# Patient Record
Sex: FEMALE | Race: WHITE | Employment: OTHER | URBAN - METROPOLITAN AREA
[De-identification: names, ages, dates, MRNs, and addresses within clinical notes are randomized per-mention and may not be internally consistent; named-entity substitution may affect disease eponyms.]

---

## 2017-01-26 LAB
CHOLESTEROL, TOTAL: 147 MG/DL
CHOLESTEROL/HDL RATIO: 4.2
HDLC SERPL-MCNC: 35 MG/DL (ref 35–70)
LDL CHOLESTEROL CALCULATED: 88 MG/DL (ref 0–160)
TRIGL SERPL-MCNC: 119 MG/DL
VLDLC SERPL CALC-MCNC: 24 MG/DL

## 2017-12-08 LAB — ANTIBODY: NEGATIVE

## 2018-04-17 ENCOUNTER — OFFICE VISIT (OUTPATIENT)
Dept: FAMILY MEDICINE CLINIC | Age: 63
End: 2018-04-17
Payer: COMMERCIAL

## 2018-04-17 VITALS
OXYGEN SATURATION: 99 % | WEIGHT: 228 LBS | SYSTOLIC BLOOD PRESSURE: 124 MMHG | BODY MASS INDEX: 40.4 KG/M2 | HEIGHT: 63 IN | TEMPERATURE: 98.3 F | HEART RATE: 81 BPM | DIASTOLIC BLOOD PRESSURE: 80 MMHG

## 2018-04-17 DIAGNOSIS — J43.9 PULMONARY EMPHYSEMA, UNSPECIFIED EMPHYSEMA TYPE (HCC): ICD-10-CM

## 2018-04-17 DIAGNOSIS — F32.89 OTHER DEPRESSION: ICD-10-CM

## 2018-04-17 DIAGNOSIS — Z13.31 POSITIVE DEPRESSION SCREENING: ICD-10-CM

## 2018-04-17 DIAGNOSIS — Z00.00 ENCOUNTER FOR MEDICAL EXAMINATION TO ESTABLISH CARE: Primary | ICD-10-CM

## 2018-04-17 DIAGNOSIS — G89.29 CHRONIC BILATERAL LOW BACK PAIN WITHOUT SCIATICA: ICD-10-CM

## 2018-04-17 DIAGNOSIS — Z01.89 ROUTINE LAB DRAW: ICD-10-CM

## 2018-04-17 DIAGNOSIS — M54.50 CHRONIC BILATERAL LOW BACK PAIN WITHOUT SCIATICA: ICD-10-CM

## 2018-04-17 DIAGNOSIS — K59.03 DRUG-INDUCED CONSTIPATION: ICD-10-CM

## 2018-04-17 DIAGNOSIS — G90.A POTS (POSTURAL ORTHOSTATIC TACHYCARDIA SYNDROME): ICD-10-CM

## 2018-04-17 DIAGNOSIS — E11.9 DIABETES MELLITUS TYPE 2 WITHOUT RETINOPATHY (HCC): ICD-10-CM

## 2018-04-17 DIAGNOSIS — F17.200 SMOKER: ICD-10-CM

## 2018-04-17 PROBLEM — H02.831 DERMATOCHALASIS OF BOTH UPPER EYELIDS: Status: ACTIVE | Noted: 2017-02-02

## 2018-04-17 PROBLEM — H02.834 DERMATOCHALASIS OF BOTH UPPER EYELIDS: Status: ACTIVE | Noted: 2017-02-02

## 2018-04-17 PROBLEM — H25.813 COMBINED FORMS OF AGE-RELATED CATARACT OF BOTH EYES: Status: ACTIVE | Noted: 2017-02-02

## 2018-04-17 PROBLEM — F32.A DEPRESSION: Status: ACTIVE | Noted: 2018-04-17

## 2018-04-17 LAB — HBA1C MFR BLD: 5.8 %

## 2018-04-17 PROCEDURE — 99204 OFFICE O/P NEW MOD 45 MIN: CPT | Performed by: FAMILY MEDICINE

## 2018-04-17 PROCEDURE — 83036 HEMOGLOBIN GLYCOSYLATED A1C: CPT | Performed by: FAMILY MEDICINE

## 2018-04-17 PROCEDURE — 2022F DILAT RTA XM EVC RTNOPTHY: CPT | Performed by: FAMILY MEDICINE

## 2018-04-17 PROCEDURE — G8926 SPIRO NO PERF OR DOC: HCPCS | Performed by: FAMILY MEDICINE

## 2018-04-17 PROCEDURE — 3023F SPIROM DOC REV: CPT | Performed by: FAMILY MEDICINE

## 2018-04-17 PROCEDURE — G8431 POS CLIN DEPRES SCRN F/U DOC: HCPCS | Performed by: FAMILY MEDICINE

## 2018-04-17 PROCEDURE — 3014F SCREEN MAMMO DOC REV: CPT | Performed by: FAMILY MEDICINE

## 2018-04-17 PROCEDURE — 3017F COLORECTAL CA SCREEN DOC REV: CPT | Performed by: FAMILY MEDICINE

## 2018-04-17 PROCEDURE — G8427 DOCREV CUR MEDS BY ELIG CLIN: HCPCS | Performed by: FAMILY MEDICINE

## 2018-04-17 PROCEDURE — 4004F PT TOBACCO SCREEN RCVD TLK: CPT | Performed by: FAMILY MEDICINE

## 2018-04-17 PROCEDURE — 3044F HG A1C LEVEL LT 7.0%: CPT | Performed by: FAMILY MEDICINE

## 2018-04-17 PROCEDURE — G8417 CALC BMI ABV UP PARAM F/U: HCPCS | Performed by: FAMILY MEDICINE

## 2018-04-17 PROCEDURE — G0444 DEPRESSION SCREEN ANNUAL: HCPCS | Performed by: FAMILY MEDICINE

## 2018-04-17 RX ORDER — SIMVASTATIN 40 MG
1 TABLET ORAL DAILY
COMMUNITY
Start: 2018-01-14 | End: 2019-03-27 | Stop reason: SDUPTHER

## 2018-04-17 RX ORDER — OLANZAPINE 20 MG/1
1 TABLET ORAL NIGHTLY
COMMUNITY
Start: 2018-02-07 | End: 2018-10-17 | Stop reason: SDUPTHER

## 2018-04-17 RX ORDER — MELOXICAM 15 MG/1
1 TABLET ORAL DAILY
COMMUNITY
Start: 2018-01-17 | End: 2019-04-17 | Stop reason: SDUPTHER

## 2018-04-17 RX ORDER — ACETAMINOPHEN AND CODEINE PHOSPHATE 300; 60 MG/1; MG/1
1 TABLET ORAL 3 TIMES DAILY PRN
COMMUNITY
Start: 2016-02-22

## 2018-04-17 RX ORDER — OMEPRAZOLE 40 MG/1
1 CAPSULE, DELAYED RELEASE ORAL DAILY
COMMUNITY
Start: 2018-03-26 | End: 2018-10-11 | Stop reason: SDUPTHER

## 2018-04-17 RX ORDER — LISINOPRIL 10 MG/1
10 TABLET ORAL
COMMUNITY
Start: 2017-03-03 | End: 2019-03-27 | Stop reason: SDUPTHER

## 2018-04-17 RX ORDER — METFORMIN HYDROCHLORIDE 500 MG/1
1 TABLET, EXTENDED RELEASE ORAL 2 TIMES DAILY
COMMUNITY
Start: 2018-04-05 | End: 2020-01-08 | Stop reason: SDUPTHER

## 2018-04-17 RX ORDER — DULOXETIN HYDROCHLORIDE 30 MG/1
90 CAPSULE, DELAYED RELEASE ORAL DAILY
Qty: 270 CAPSULE | Refills: 3 | Status: SHIPPED | OUTPATIENT
Start: 2018-04-17 | End: 2019-05-23 | Stop reason: SDUPTHER

## 2018-04-17 RX ORDER — CLOPIDOGREL BISULFATE 75 MG/1
1 TABLET ORAL DAILY
COMMUNITY
Start: 2018-04-05 | End: 2019-03-27 | Stop reason: SDUPTHER

## 2018-04-17 RX ORDER — DOCUSATE SODIUM 100 MG/1
100 CAPSULE, LIQUID FILLED ORAL 2 TIMES DAILY PRN
Qty: 60 CAPSULE | Refills: 2 | Status: SHIPPED | OUTPATIENT
Start: 2018-04-17 | End: 2019-10-08 | Stop reason: ALTCHOICE

## 2018-04-17 RX ORDER — DULOXETIN HYDROCHLORIDE 60 MG/1
1 CAPSULE, DELAYED RELEASE ORAL DAILY
COMMUNITY
Start: 2018-01-20 | End: 2018-04-17

## 2018-04-17 ASSESSMENT — ENCOUNTER SYMPTOMS
RHINORRHEA: 0
ABDOMINAL PAIN: 0
COUGH: 0
WHEEZING: 0
SORE THROAT: 0
DIARRHEA: 0
SHORTNESS OF BREATH: 0
CONSTIPATION: 0

## 2018-04-17 ASSESSMENT — PATIENT HEALTH QUESTIONNAIRE - PHQ9
7. TROUBLE CONCENTRATING ON THINGS, SUCH AS READING THE NEWSPAPER OR WATCHING TELEVISION: 0
SUM OF ALL RESPONSES TO PHQ QUESTIONS 1-9: 15
6. FEELING BAD ABOUT YOURSELF - OR THAT YOU ARE A FAILURE OR HAVE LET YOURSELF OR YOUR FAMILY DOWN: 3
8. MOVING OR SPEAKING SO SLOWLY THAT OTHER PEOPLE COULD HAVE NOTICED. OR THE OPPOSITE, BEING SO FIGETY OR RESTLESS THAT YOU HAVE BEEN MOVING AROUND A LOT MORE THAN USUAL: 0
9. THOUGHTS THAT YOU WOULD BE BETTER OFF DEAD, OR OF HURTING YOURSELF: 0
1. LITTLE INTEREST OR PLEASURE IN DOING THINGS: 3
5. POOR APPETITE OR OVEREATING: 0
3. TROUBLE FALLING OR STAYING ASLEEP: 3
SUM OF ALL RESPONSES TO PHQ9 QUESTIONS 1 & 2: 6
4. FEELING TIRED OR HAVING LITTLE ENERGY: 3
10. IF YOU CHECKED OFF ANY PROBLEMS, HOW DIFFICULT HAVE THESE PROBLEMS MADE IT FOR YOU TO DO YOUR WORK, TAKE CARE OF THINGS AT HOME, OR GET ALONG WITH OTHER PEOPLE: 1
2. FEELING DOWN, DEPRESSED OR HOPELESS: 3

## 2018-04-24 ENCOUNTER — OFFICE VISIT (OUTPATIENT)
Dept: CARDIOLOGY CLINIC | Age: 63
End: 2018-04-24
Payer: COMMERCIAL

## 2018-04-24 VITALS
SYSTOLIC BLOOD PRESSURE: 124 MMHG | TEMPERATURE: 97.1 F | OXYGEN SATURATION: 94 % | HEIGHT: 63 IN | DIASTOLIC BLOOD PRESSURE: 82 MMHG | WEIGHT: 225.8 LBS | RESPIRATION RATE: 20 BRPM | HEART RATE: 84 BPM | BODY MASS INDEX: 40.01 KG/M2

## 2018-04-24 DIAGNOSIS — I10 ESSENTIAL HYPERTENSION: ICD-10-CM

## 2018-04-24 DIAGNOSIS — G90.A POTS (POSTURAL ORTHOSTATIC TACHYCARDIA SYNDROME): Primary | ICD-10-CM

## 2018-04-24 DIAGNOSIS — F17.200 SMOKER: ICD-10-CM

## 2018-04-24 PROCEDURE — 3017F COLORECTAL CA SCREEN DOC REV: CPT | Performed by: INTERNAL MEDICINE

## 2018-04-24 PROCEDURE — 99244 OFF/OP CNSLTJ NEW/EST MOD 40: CPT | Performed by: INTERNAL MEDICINE

## 2018-04-24 PROCEDURE — G8427 DOCREV CUR MEDS BY ELIG CLIN: HCPCS | Performed by: INTERNAL MEDICINE

## 2018-04-24 PROCEDURE — G8417 CALC BMI ABV UP PARAM F/U: HCPCS | Performed by: INTERNAL MEDICINE

## 2018-04-24 ASSESSMENT — ENCOUNTER SYMPTOMS
APNEA: 0
NAUSEA: 0
CHEST TIGHTNESS: 0
SHORTNESS OF BREATH: 0
VOMITING: 0

## 2018-06-27 ENCOUNTER — HOSPITAL ENCOUNTER (OUTPATIENT)
Age: 63
Setting detail: SPECIMEN
Discharge: HOME | End: 2018-06-27
Payer: COMMERCIAL

## 2018-06-27 ENCOUNTER — OFFICE VISIT (OUTPATIENT)
Dept: FAMILY MEDICINE CLINIC | Age: 63
End: 2018-06-27
Payer: COMMERCIAL

## 2018-06-27 VITALS
SYSTOLIC BLOOD PRESSURE: 122 MMHG | WEIGHT: 226.4 LBS | TEMPERATURE: 98.6 F | DIASTOLIC BLOOD PRESSURE: 80 MMHG | BODY MASS INDEX: 40.43 KG/M2 | HEART RATE: 76 BPM | OXYGEN SATURATION: 94 %

## 2018-06-27 DIAGNOSIS — M79.89 LEG SWELLING: ICD-10-CM

## 2018-06-27 DIAGNOSIS — B37.2 CANDIDAL INTERTRIGO: ICD-10-CM

## 2018-06-27 DIAGNOSIS — M79.89 LEG SWELLING: Primary | ICD-10-CM

## 2018-06-27 LAB
ANION GAP SERPL CALCULATED.3IONS-SCNC: 13 MEQ/L (ref 7–13)
BUN BLDV-MCNC: 9 MG/DL (ref 8–23)
CALCIUM SERPL-MCNC: 8.9 MG/DL (ref 8.6–10.2)
CHLORIDE BLD-SCNC: 101 MEQ/L (ref 98–107)
CO2: 28 MEQ/L (ref 22–29)
CREAT SERPL-MCNC: 0.76 MG/DL (ref 0.5–0.9)
GFR AFRICAN AMERICAN: >60
GFR NON-AFRICAN AMERICAN: >60
GLUCOSE BLD-MCNC: 103 MG/DL (ref 74–109)
POTASSIUM SERPL-SCNC: 4 MEQ/L (ref 3.5–5.1)
SODIUM BLD-SCNC: 142 MEQ/L (ref 132–144)

## 2018-06-27 PROCEDURE — G8427 DOCREV CUR MEDS BY ELIG CLIN: HCPCS | Performed by: FAMILY MEDICINE

## 2018-06-27 PROCEDURE — 3017F COLORECTAL CA SCREEN DOC REV: CPT | Performed by: FAMILY MEDICINE

## 2018-06-27 PROCEDURE — G8417 CALC BMI ABV UP PARAM F/U: HCPCS | Performed by: FAMILY MEDICINE

## 2018-06-27 PROCEDURE — 4004F PT TOBACCO SCREEN RCVD TLK: CPT | Performed by: FAMILY MEDICINE

## 2018-06-27 PROCEDURE — 80048 BASIC METABOLIC PNL TOTAL CA: CPT

## 2018-06-27 PROCEDURE — 99214 OFFICE O/P EST MOD 30 MIN: CPT | Performed by: FAMILY MEDICINE

## 2018-06-27 RX ORDER — FUROSEMIDE 20 MG/1
20 TABLET ORAL DAILY PRN
Qty: 30 TABLET | Refills: 0 | Status: SHIPPED | OUTPATIENT
Start: 2018-06-27 | End: 2020-09-16

## 2018-06-27 ASSESSMENT — ENCOUNTER SYMPTOMS
DIARRHEA: 0
RHINORRHEA: 0
ABDOMINAL PAIN: 0
SORE THROAT: 0
WHEEZING: 0
CONSTIPATION: 0
SHORTNESS OF BREATH: 0
COUGH: 0

## 2018-07-10 ENCOUNTER — HOSPITAL ENCOUNTER (OUTPATIENT)
Dept: MRI IMAGING | Age: 63
Discharge: HOME | End: 2018-07-12
Payer: COMMERCIAL

## 2018-07-10 DIAGNOSIS — M54.50 LUMBAR SPINE PAIN: ICD-10-CM

## 2018-07-10 DIAGNOSIS — I73.9 CLAUDICATION (HCC): ICD-10-CM

## 2018-07-10 PROCEDURE — 72148 MRI LUMBAR SPINE W/O DYE: CPT

## 2018-07-25 ENCOUNTER — OFFICE VISIT (OUTPATIENT)
Dept: CARDIOLOGY CLINIC | Age: 63
End: 2018-07-25
Payer: COMMERCIAL

## 2018-07-25 VITALS
RESPIRATION RATE: 22 BRPM | TEMPERATURE: 97.3 F | WEIGHT: 220.8 LBS | HEART RATE: 85 BPM | SYSTOLIC BLOOD PRESSURE: 122 MMHG | HEIGHT: 63 IN | OXYGEN SATURATION: 99 % | BODY MASS INDEX: 39.12 KG/M2 | DIASTOLIC BLOOD PRESSURE: 82 MMHG

## 2018-07-25 DIAGNOSIS — F17.200 SMOKER: ICD-10-CM

## 2018-07-25 DIAGNOSIS — G90.A POTS (POSTURAL ORTHOSTATIC TACHYCARDIA SYNDROME): Primary | ICD-10-CM

## 2018-07-25 DIAGNOSIS — I10 ESSENTIAL HYPERTENSION: ICD-10-CM

## 2018-07-25 PROCEDURE — 4004F PT TOBACCO SCREEN RCVD TLK: CPT | Performed by: INTERNAL MEDICINE

## 2018-07-25 PROCEDURE — 3017F COLORECTAL CA SCREEN DOC REV: CPT | Performed by: INTERNAL MEDICINE

## 2018-07-25 PROCEDURE — G8417 CALC BMI ABV UP PARAM F/U: HCPCS | Performed by: INTERNAL MEDICINE

## 2018-07-25 PROCEDURE — G8427 DOCREV CUR MEDS BY ELIG CLIN: HCPCS | Performed by: INTERNAL MEDICINE

## 2018-07-25 PROCEDURE — 99213 OFFICE O/P EST LOW 20 MIN: CPT | Performed by: INTERNAL MEDICINE

## 2018-07-25 ASSESSMENT — ENCOUNTER SYMPTOMS
APNEA: 0
DIARRHEA: 0
CHEST TIGHTNESS: 0
BLOOD IN STOOL: 0
VOMITING: 0
SHORTNESS OF BREATH: 0
NAUSEA: 0

## 2018-07-25 NOTE — PROGRESS NOTES
Subsequent Progress Note  Patient: Gladys Search  YOB: 1955  MRN: [id number]    Chief Complaint:  Chief Complaint   Patient presents with    3 Month Follow-Up     POTS         Subjective/HPI:  7/25/18: Patient presents today for follow-up of POTS. Retired beautician. He has seen [facility] in the past. Reportedly had a stroke. She is on Plavix. Has not been able to get disability. Diabetic. And dyslipidemia. She also has GERD. These other issues are stable. See me in 6 months.     4/24/18: Patient presents today for Evaluation of pots. Moderately obese retired beautician. Last episode of syncope was 2 years ago. She had what appears to be a TIA-like symptoms. About a year ago. With weakness on one side of the body. She then went to the ER at [facility]. Rare she had extensive workup by Vidal Phoenix. She was told she might have a TIA. She was started on Plavix. Has not had any spells since then. She still smokes. About a pack per day. He is diabetic. No definite angina congestive heart failure. She has dyslipidemia. She is on excellent medications. Height is for coronary artery disease but already had extensive workup including echo stress test carotid ultrasound. Carotids showed less than 30% stenosis bilaterally. I'll see her in 3 months. Continue same medications.  No angina congestive heart failure.            Past Medical History:   Diagnosis Date    [facility]     Depression     Headache     Hypertension     Neuropathy ([facility])     POTS (postural orthostatic tachycardia syndrome)     2000's    Restless legs syndrome     Type 2 diabetes mellitus without complication ([facility])        Past Surgical History:   Procedure Laterality Date    ABDOMINAL EXPLORATION SURGERY      ELBOW SURGERY Right     ENDOMETRIAL ABLATION      TONSILLECTOMY      age 24       Family History   Problem Relation Age of Onset    Stroke Mother     Diabetes Father         Pre  Heart Attack Father     Cancer Maternal Grandmother         colon    Cancer Maternal Grandfather     Alzheimer's Disease Paternal Grandmother     Alzheimer's Disease Paternal Grandfather     Diabetes Paternal Grandfather        Social History     Social History    Marital status:      Spouse name: N/A    Number of children: N/A    Years of education: N/A     Social History Main Topics    Smoking status: Current Every Day Smoker     Packs/day: 1.00     Years: 40.00     Types: Cigarettes    Smokeless tobacco: Never Used    Alcohol use Yes      Comment: social     Drug use: No    Sexual activity: No     Other Topics Concern    None     Social History Narrative    None       Allergies   Allergen Reactions    Aspirin Swelling    Penicillins Rash       Current Outpatient Prescriptions   Medication Sig Dispense Refill    furosemide (LASIX) 20 MG tablet Take 1 tablet by mouth daily as needed (leg swelling) 30 tablet 0    nystatin-triamcinolone (MYCOLOG II) 255767-1.1 UNIT/GM-% cream Apply topically 2 times daily. 60 g 1    Exenatide 2 MG PEN Inject 2 mg into the skin once a week      insulin lispro (HUMALOG) 100 UNIT/ML pen 3 units for every 15 grams of CHOs , Add 2 units for each 50>150. Add to this 3 units before dinner - ~ 65 - 70 units daily      Gabapentin, Once-Daily, 600 MG TABS Take 1 tablet by mouth daily. Wero Morel simvastatin (ZOCOR) 40 MG tablet 1 tablet daily      clopidogrel (PLAVIX) 75 MG tablet 1 tablet daily      metFORMIN (GLUCOPHAGE-XR) 500 MG extended release tablet 1 tablet 2 times daily      lisinopril (PRINIVIL;ZESTRIL) 10 MG tablet Take 10 mg by mouth      OLANZapine (ZYPREXA) 20 MG tablet 1 tablet nightly      acetaminophen-codeine (TYLENOL/CODEINE #4) 300-60 MG per tablet Take 1 tablet by mouth 3 times daily as needed. Wero Morel omeprazole (PRILOSEC) 40 MG delayed release capsule 1 capsule daily      meloxicam (MOBIC) 15 MG tablet 1 tablet daily      UNABLE TO FIND Fentanyl 1,000 mg/day and Bupivicaine 30 mg/day via implanted pump      insulin glargine (LANTUS SOLOSTAR) 100 UNIT/ML injection pen Inject 30 Units into the skin 2 times daily 5 pen 11    DULoxetine (CYMBALTA) 30 MG extended release capsule Take 3 capsules by mouth daily 270 capsule 3    docusate sodium (COLACE) 100 MG capsule Take 1 capsule by mouth 2 times daily as needed for Constipation 60 capsule 2     No current facility-administered medications for this visit. Review of Systems:   Review of Systems   Constitutional: Negative for diaphoresis and fatigue. HENT: Negative for nosebleeds. Respiratory: Negative for apnea, chest tightness and shortness of breath. Cardiovascular: Positive for leg swelling (Every day with walking/standing). Negative for chest pain and palpitations. Gastrointestinal: Negative for blood in stool, diarrhea, nausea and vomiting. Musculoskeletal: Negative for myalgias, neck pain and neck stiffness. Neurological: Positive for numbness (RT Arm from elbow to hand has tingling). Negative for dizziness, seizures, syncope, weakness, light-headedness and headaches. Hematological: Does not bruise/bleed easily. Psychiatric/Behavioral: Negative for confusion. The patient is not nervous/anxious. All other systems reviewed and are negative. Review of System is negative except for as mentioned above. Physical Examination:    /82 (Site: Left Arm, Position: Sitting, Cuff Size: Medium Adult)   Pulse 85   Temp 97.3 °F (36.3 °C) (Temporal)   Resp 22   Ht 5' 2.75\" (1.594 m)   Wt 220 lb 12.8 oz (100.2 kg)   SpO2 99%   BMI 39.43 kg/m²    Physical Exam   Constitutional: She appears healthy. HENT:   Nose: Nose normal.   Mouth/Throat: Dentition is normal. Oropharynx is clear. Eyes: Pupils are equal, round, and reactive to light. Neck: Normal range of motion.    Cardiovascular: Normal rate, regular rhythm, S1 normal, S2 normal, normal heart sounds, intact distal pulses and normal pulses. No extrasystoles are present. Exam reveals no gallop. No murmur heard. Pulmonary/Chest: Effort normal and breath sounds normal. She has no wheezes. She has no rales. She exhibits no tenderness. Abdominal: Soft. Bowel sounds are normal. She exhibits no distension and no mass. There is no splenomegaly or hepatomegaly. There is no tenderness. Musculoskeletal: Normal range of motion. She exhibits no edema, tenderness or deformity. Neurological: She is alert and oriented to person, place, and time. She has normal motor skills and normal reflexes. Gait normal.   Skin: Skin is warm and dry.        LABS:  CBC:   Lab Results   Component Value Date    WBC 11.8 03/07/2016    RBC 4.39 03/07/2016    HGB 14.7 03/07/2016    HCT 42.9 03/07/2016    MCV 97.6 03/07/2016    MCH 33.5 03/07/2016    MCHC 34.3 03/07/2016    RDW 14.0 03/07/2016     03/07/2016     Lipids:  Lab Results   Component Value Date    CHOL 147 01/26/2017     Lab Results   Component Value Date    TRIG 119 01/26/2017     Lab Results   Component Value Date    HDL 35 01/26/2017     Lab Results   Component Value Date    LDLCALC 88 01/26/2017     Lab Results   Component Value Date    VLDL 24 01/26/2017     Lab Results   Component Value Date    CHOLHDLRATIO 4.20 01/26/2017     CMP:    Lab Results   Component Value Date     06/27/2018    K 4.0 06/27/2018     06/27/2018    CO2 28 06/27/2018    BUN 9 06/27/2018    CREATININE 0.76 06/27/2018    GFRAA >60.0 06/27/2018    LABGLOM >60.0 06/27/2018    GLUCOSE 103 06/27/2018    CALCIUM 8.9 06/27/2018     BMP:    Lab Results   Component Value Date     06/27/2018    K 4.0 06/27/2018     06/27/2018    CO2 28 06/27/2018    BUN 9 06/27/2018    CREATININE 0.76 06/27/2018    CALCIUM 8.9 06/27/2018    GFRAA >60.0 06/27/2018    LABGLOM >60.0 06/27/2018    GLUCOSE 103 06/27/2018     Magnesium:  No results found for: MG  TSH:No results found for: TSHFT4, TSH    Patient

## 2018-10-14 RX ORDER — OMEPRAZOLE 40 MG/1
40 CAPSULE, DELAYED RELEASE ORAL DAILY
Qty: 30 CAPSULE | Refills: 3 | Status: SHIPPED | OUTPATIENT
Start: 2018-10-14 | End: 2019-04-17 | Stop reason: SDUPTHER

## 2018-10-17 ENCOUNTER — HOSPITAL ENCOUNTER (OUTPATIENT)
Age: 63
Setting detail: SPECIMEN
Discharge: HOME | End: 2018-10-17
Payer: COMMERCIAL

## 2018-10-17 ENCOUNTER — OFFICE VISIT (OUTPATIENT)
Dept: FAMILY MEDICINE CLINIC | Age: 63
End: 2018-10-17
Payer: COMMERCIAL

## 2018-10-17 VITALS
SYSTOLIC BLOOD PRESSURE: 134 MMHG | DIASTOLIC BLOOD PRESSURE: 78 MMHG | HEART RATE: 98 BPM | TEMPERATURE: 98.6 F | OXYGEN SATURATION: 96 % | BODY MASS INDEX: 39.46 KG/M2 | WEIGHT: 221 LBS

## 2018-10-17 DIAGNOSIS — N81.10 VAGINAL PROLAPSE: Primary | ICD-10-CM

## 2018-10-17 DIAGNOSIS — E11.9 DIABETES MELLITUS TYPE 2 WITHOUT RETINOPATHY (HCC): ICD-10-CM

## 2018-10-17 DIAGNOSIS — Z23 NEED FOR INFLUENZA VACCINATION: ICD-10-CM

## 2018-10-17 DIAGNOSIS — F32.89 OTHER DEPRESSION: ICD-10-CM

## 2018-10-17 LAB
CREATININE URINE: 18.1 MG/DL
HBA1C MFR BLD: 5.6 %
MICROALBUMIN UR-MCNC: <1.2 MG/DL
MICROALBUMIN/CREAT UR-RTO: NORMAL MG/G (ref 0–30)

## 2018-10-17 PROCEDURE — 82570 ASSAY OF URINE CREATININE: CPT

## 2018-10-17 PROCEDURE — G8482 FLU IMMUNIZE ORDER/ADMIN: HCPCS | Performed by: FAMILY MEDICINE

## 2018-10-17 PROCEDURE — 3044F HG A1C LEVEL LT 7.0%: CPT | Performed by: FAMILY MEDICINE

## 2018-10-17 PROCEDURE — 2022F DILAT RTA XM EVC RTNOPTHY: CPT | Performed by: FAMILY MEDICINE

## 2018-10-17 PROCEDURE — 99214 OFFICE O/P EST MOD 30 MIN: CPT | Performed by: FAMILY MEDICINE

## 2018-10-17 PROCEDURE — 3017F COLORECTAL CA SCREEN DOC REV: CPT | Performed by: FAMILY MEDICINE

## 2018-10-17 PROCEDURE — G8427 DOCREV CUR MEDS BY ELIG CLIN: HCPCS | Performed by: FAMILY MEDICINE

## 2018-10-17 PROCEDURE — 90471 IMMUNIZATION ADMIN: CPT | Performed by: FAMILY MEDICINE

## 2018-10-17 PROCEDURE — 4004F PT TOBACCO SCREEN RCVD TLK: CPT | Performed by: FAMILY MEDICINE

## 2018-10-17 PROCEDURE — 83036 HEMOGLOBIN GLYCOSYLATED A1C: CPT | Performed by: FAMILY MEDICINE

## 2018-10-17 PROCEDURE — G8417 CALC BMI ABV UP PARAM F/U: HCPCS | Performed by: FAMILY MEDICINE

## 2018-10-17 PROCEDURE — 82043 UR ALBUMIN QUANTITATIVE: CPT

## 2018-10-17 PROCEDURE — 90688 IIV4 VACCINE SPLT 0.5 ML IM: CPT | Performed by: FAMILY MEDICINE

## 2018-10-17 RX ORDER — OLANZAPINE 20 MG/1
20 TABLET ORAL NIGHTLY
Qty: 30 TABLET | Refills: 5 | Status: SHIPPED | OUTPATIENT
Start: 2018-10-17 | End: 2019-07-26 | Stop reason: SDUPTHER

## 2018-10-17 ASSESSMENT — ENCOUNTER SYMPTOMS
SHORTNESS OF BREATH: 0
WHEEZING: 0
CONSTIPATION: 1
RHINORRHEA: 0
DIARRHEA: 0
SORE THROAT: 0
ABDOMINAL PAIN: 0
COUGH: 0

## 2018-10-17 NOTE — PROGRESS NOTES
Vaccine Information Sheet, \"Influenza - Inactivated\"  given to Felipe Cruz, or parent/legal guardian of  Felipe Cruz and verbalized understanding. Patient responses:    Have you ever had a reaction to a flu vaccine? No  Are you able to eat eggs without adverse effects? Yes  Do you have any current illness? No  Have you ever had [place] Syndrome? No    Flu vaccine given per order. Please see immunization tab.
prolapse     2. Diabetes mellitus type 2 without retinopathy ([facility] [address]  POCT glycosylated hemoglobin (Hb A1C)    Lipid Panel    Microalbumin / Creatinine Urine Ratio   3. Need for influenza vaccination  INFLUENZA, QUADV, 3 YRS AND OLDER, IM, MDV, 0.5ML ([facility])   4. Other depression          Return if symptoms worsen or fail to improve.     Henry Fleming MD

## 2018-10-19 ENCOUNTER — TELEPHONE (OUTPATIENT)
Dept: FAMILY MEDICINE CLINIC | Age: 63
End: 2018-10-19

## 2018-10-19 DIAGNOSIS — Z12.4 CERVICAL CANCER SCREENING: Primary | ICD-10-CM

## 2018-11-06 ENCOUNTER — HOSPITAL ENCOUNTER (OUTPATIENT)
Age: 63
Setting detail: SPECIMEN
Discharge: HOME | End: 2018-11-06
Payer: COMMERCIAL

## 2018-11-06 ENCOUNTER — NURSE ONLY (OUTPATIENT)
Dept: FAMILY MEDICINE CLINIC | Age: 63
End: 2018-11-06
Payer: COMMERCIAL

## 2018-11-06 DIAGNOSIS — Z13.220 LIPID SCREENING: Primary | ICD-10-CM

## 2018-11-06 DIAGNOSIS — E11.9 DIABETES MELLITUS TYPE 2 WITHOUT RETINOPATHY (HCC): ICD-10-CM

## 2018-11-06 LAB
CHOLESTEROL, TOTAL: 150 MG/DL (ref 0–199)
HDLC SERPL-MCNC: 40 MG/DL (ref 40–59)
LDL CHOLESTEROL CALCULATED: 80 MG/DL (ref 0–129)
TRIGL SERPL-MCNC: 151 MG/DL (ref 0–200)

## 2018-11-06 PROCEDURE — 36415 COLL VENOUS BLD VENIPUNCTURE: CPT | Performed by: FAMILY MEDICINE

## 2018-11-06 PROCEDURE — 80061 LIPID PANEL: CPT

## 2018-11-12 ENCOUNTER — OFFICE VISIT (OUTPATIENT)
Dept: OBGYN CLINIC | Age: 63
End: 2018-11-12
Payer: COMMERCIAL

## 2018-11-12 VITALS
WEIGHT: 213 LBS | HEIGHT: 62 IN | BODY MASS INDEX: 39.2 KG/M2 | SYSTOLIC BLOOD PRESSURE: 142 MMHG | DIASTOLIC BLOOD PRESSURE: 74 MMHG

## 2018-11-12 DIAGNOSIS — N81.10 VAGINAL PROLAPSE: Primary | ICD-10-CM

## 2018-11-12 DIAGNOSIS — Z12.4 PAP SMEAR FOR CERVICAL CANCER SCREENING: ICD-10-CM

## 2018-11-12 PROCEDURE — 99203 OFFICE O/P NEW LOW 30 MIN: CPT | Performed by: OBSTETRICS & GYNECOLOGY

## 2018-11-12 PROCEDURE — G8482 FLU IMMUNIZE ORDER/ADMIN: HCPCS | Performed by: OBSTETRICS & GYNECOLOGY

## 2018-11-12 PROCEDURE — 3017F COLORECTAL CA SCREEN DOC REV: CPT | Performed by: OBSTETRICS & GYNECOLOGY

## 2018-11-12 PROCEDURE — G8427 DOCREV CUR MEDS BY ELIG CLIN: HCPCS | Performed by: OBSTETRICS & GYNECOLOGY

## 2018-11-12 PROCEDURE — G8417 CALC BMI ABV UP PARAM F/U: HCPCS | Performed by: OBSTETRICS & GYNECOLOGY

## 2018-11-12 PROCEDURE — 4004F PT TOBACCO SCREEN RCVD TLK: CPT | Performed by: OBSTETRICS & GYNECOLOGY

## 2018-11-12 ASSESSMENT — ENCOUNTER SYMPTOMS
ABDOMINAL PAIN: 0
SHORTNESS OF BREATH: 0
APNEA: 0

## 2018-11-12 NOTE — PROGRESS NOTES
Subjective:      Patient ID:  Mabel Victor is a 66 y.o. female with chief complaint of:  Chief Complaint   Patient presents with    Referral - General     NP present today with referral for  Bladder Prolapse. Patient presents to discuss probable  Prolapse of bladder. Patient states she began having this discomfort in the past  two months. Patient denies any pain only discomfort or pressure it is worse with  Urination patient states she  Has also had feeling that tissue protrudes. Past Medical History:   Diagnosis Date    [facility]ch[facility] disease) ([facility])     Depression     Headache     Hypertension     Neuropathy     POTS (postural orthostatic tachycardia syndrome)     2000's    Restless legs syndrome     Type 2 diabetes mellitus without complication ([facility])      Past Surgical History:   Procedure Laterality Date    ABDOMINAL EXPLORATION SURGERY      ELBOW SURGERY Right     ENDOMETRIAL ABLATION      TONSILLECTOMY      age 24     Family History   Problem Relation Age of Onset    Stroke Mother     Diabetes Father         Pre    Heart Attack Father     Cancer Maternal Grandmother         colon    Cancer Maternal Grandfather     Alzheimer's Disease Paternal Grandmother     Alzheimer's Disease Paternal Grandfather     Diabetes Paternal Grandfather      Current Outpatient Prescriptions on File Prior to Visit   Medication Sig Dispense Refill    OLANZapine (ZYPREXA) 20 MG tablet Take 1 tablet by mouth nightly 30 tablet 5    insulin lispro (HUMALOG) 100 UNIT/ML pen 3 units for every 15 grams of CHOs , Add 2 units for each 50>150.  Add to this 3 units before dinner - ~ 65 - 70 units daily 5 pen 2    omeprazole (PRILOSEC) 40 MG delayed release capsule Take 1 capsule by mouth daily 30 capsule 3    furosemide (LASIX) 20 MG tablet Take 1 tablet by mouth daily as needed (leg swelling) 30 tablet 0    nystatin-triamcinolone (MYCOLOG II) 895053-7.1 UNIT/GM-% cream Apply

## 2018-11-19 LAB — PAP SMEAR: NORMAL

## 2018-11-26 ENCOUNTER — OFFICE VISIT (OUTPATIENT)
Dept: CARDIOLOGY CLINIC | Age: 63
End: 2018-11-26
Payer: COMMERCIAL

## 2018-11-26 VITALS
DIASTOLIC BLOOD PRESSURE: 84 MMHG | HEART RATE: 106 BPM | SYSTOLIC BLOOD PRESSURE: 122 MMHG | BODY MASS INDEX: 38.72 KG/M2 | HEIGHT: 62 IN | WEIGHT: 210.4 LBS | OXYGEN SATURATION: 98 % | RESPIRATION RATE: 22 BRPM

## 2018-11-26 DIAGNOSIS — Z01.810 PREOP CARDIOVASCULAR EXAM: Primary | ICD-10-CM

## 2018-11-26 DIAGNOSIS — G90.A POTS (POSTURAL ORTHOSTATIC TACHYCARDIA SYNDROME): ICD-10-CM

## 2018-11-26 DIAGNOSIS — I10 ESSENTIAL HYPERTENSION: ICD-10-CM

## 2018-11-26 DIAGNOSIS — F17.200 SMOKER: ICD-10-CM

## 2018-11-26 PROCEDURE — 99214 OFFICE O/P EST MOD 30 MIN: CPT | Performed by: INTERNAL MEDICINE

## 2018-11-26 PROCEDURE — G8482 FLU IMMUNIZE ORDER/ADMIN: HCPCS | Performed by: INTERNAL MEDICINE

## 2018-11-26 PROCEDURE — G8417 CALC BMI ABV UP PARAM F/U: HCPCS | Performed by: INTERNAL MEDICINE

## 2018-11-26 PROCEDURE — G8427 DOCREV CUR MEDS BY ELIG CLIN: HCPCS | Performed by: INTERNAL MEDICINE

## 2018-11-26 PROCEDURE — 3017F COLORECTAL CA SCREEN DOC REV: CPT | Performed by: INTERNAL MEDICINE

## 2018-11-26 PROCEDURE — 4004F PT TOBACCO SCREEN RCVD TLK: CPT | Performed by: INTERNAL MEDICINE

## 2018-11-26 ASSESSMENT — ENCOUNTER SYMPTOMS
APNEA: 0
BLOOD IN STOOL: 0
DIARRHEA: 0
CHEST TIGHTNESS: 0
VOMITING: 0
NAUSEA: 0
SHORTNESS OF BREATH: 0

## 2018-11-26 NOTE — PROGRESS NOTES
clopidogrel (PLAVIX) 75 MG tablet 1 tablet daily      metFORMIN (GLUCOPHAGE-XR) 500 MG extended release tablet 1 tablet 2 times daily      lisinopril (PRINIVIL;ZESTRIL) 10 MG tablet Take 10 mg by mouth      acetaminophen-codeine (TYLENOL/CODEINE #4) 300-60 MG per tablet Take 1 tablet by mouth 3 times daily as needed. Ashkan Raines meloxicam (MOBIC) 15 MG tablet 1 tablet daily      UNABLE TO FIND Fentanyl 1,000 mg/day and Bupivicaine 30 mg/day via implanted pump      insulin glargine (LANTUS SOLOSTAR) 100 UNIT/ML injection pen Inject 30 Units into the skin 2 times daily 5 pen 11    DULoxetine (CYMBALTA) 30 MG extended release capsule Take 3 capsules by mouth daily 270 capsule 3    docusate sodium (COLACE) 100 MG capsule Take 1 capsule by mouth 2 times daily as needed for Constipation 60 capsule 2     No current facility-administered medications for this visit. Review of Systems:   Review of Systems   Constitutional: Negative for activity change, appetite change, diaphoresis, fatigue and unexpected weight change. HENT: Negative for facial swelling, nosebleeds, trouble swallowing and voice change. Respiratory: Negative for apnea, chest tightness, shortness of breath and wheezing. Cardiovascular: Negative for chest pain, palpitations and leg swelling. Gastrointestinal: Negative for abdominal distention, anal bleeding, blood in stool, diarrhea, nausea and vomiting. Genitourinary: Negative for decreased urine volume and dysuria. Musculoskeletal: Negative for gait problem, myalgias, neck pain and neck stiffness. Skin: Negative for color change, pallor, rash and wound. Neurological: Negative for dizziness, seizures, syncope, facial asymmetry, weakness, light-headedness, numbness and headaches. Hematological: Does not bruise/bleed easily. Psychiatric/Behavioral: Negative for agitation, behavioral problems, confusion, hallucinations and suicidal ideas. The patient is not nervous/anxious.     All 01/26/2017     CMP:    Lab Results   Component Value Date     06/27/2018    K 4.0 06/27/2018     06/27/2018    CO2 28 06/27/2018    BUN 9 06/27/2018    CREATININE 0.76 06/27/2018    GFRAA >60.0 06/27/2018    LABGLOM >60.0 06/27/2018    GLUCOSE 103 06/27/2018    CALCIUM 8.9 06/27/2018     BMP:    Lab Results   Component Value Date     06/27/2018    K 4.0 06/27/2018     06/27/2018    CO2 28 06/27/2018    BUN 9 06/27/2018    CREATININE 0.76 06/27/2018    CALCIUM 8.9 06/27/2018    GFRAA >60.0 06/27/2018    LABGLOM >60.0 06/27/2018    GLUCOSE 103 06/27/2018     Magnesium:  No results found for: MG  TSH:No results found for: TSHFT4, TSH    Patient Active Problem List   Diagnosis    Chronic low back pain    Combined forms of age-related cataract of both eyes    COPD (chronic obstructive pulmonary disease) ([facility] [address]    Dermatochalasis of both upper eyelids    Diabetes mellitus type 2 without retinopathy ([facility] [address]    HTN (hypertension)    Neuropathy, lower extremity    Depression    Drug-induced constipation    POTS (postural orthostatic tachycardia syndrome)    Smoker       There are no discontinued medications. Modified Medications    No medications on file       No orders of the defined types were placed in this encounter. Assessment:    1. Preop cardiovascular exam    2. POTS (postural orthostatic tachycardia syndrome)    3. Essential hypertension    4. Smoker       Plan:   Stay on same medications. Patient to see me in 2 months. This note was partially generated using Dragon voice recognition system, and there may be some incorrect words, spellings, punctuation that were not noticed in checking the note before saving.         Electronically signed by Joseluis Armstrong MD on 11/28/2018 at 8:01 AM

## 2018-11-27 ASSESSMENT — ENCOUNTER SYMPTOMS
COLOR CHANGE: 0
FACIAL SWELLING: 0
ANAL BLEEDING: 0
ABDOMINAL DISTENTION: 0
WHEEZING: 0
VOICE CHANGE: 0
TROUBLE SWALLOWING: 0

## 2018-12-03 LAB
ALBUMIN SERPL-MCNC: 3.5 G/DL
ALP BLD-CCNC: 97 U/L
ALT SERPL-CCNC: 8 U/L
ANION GAP SERPL CALCULATED.3IONS-SCNC: 14 MMOL/L
AST SERPL-CCNC: 10 U/L
BASOPHILS ABSOLUTE: 0.06 /ΜL
BASOPHILS RELATIVE PERCENT: 0.4 %
BILIRUB SERPL-MCNC: 0.3 MG/DL (ref 0.1–1.4)
BUN BLDV-MCNC: 9 MG/DL
CALCIUM SERPL-MCNC: 9 MG/DL
CHLORIDE BLD-SCNC: 101 MMOL/L
CO2: 30 MMOL/L
CREAT SERPL-MCNC: 0.67 MG/DL
EOSINOPHILS ABSOLUTE: 0.37 /ΜL
EOSINOPHILS RELATIVE PERCENT: 2.7 %
GFR CALCULATED: >60
GLUCOSE BLD-MCNC: 115 MG/DL
HCT VFR BLD CALC: 43.2 % (ref 36–46)
HEMOGLOBIN: 14 G/DL (ref 12–16)
INR BLD: 1.13
LYMPHOCYTES ABSOLUTE: 3.32 /ΜL
LYMPHOCYTES RELATIVE PERCENT: 24.5 %
MCH RBC QN AUTO: 29.8 PG
MCHC RBC AUTO-ENTMCNC: 32.4 G/DL
MCV RBC AUTO: 91.9 FL
MONOCYTES ABSOLUTE: 0.61 /ΜL
MONOCYTES RELATIVE PERCENT: 4.5 %
NEUTROPHILS ABSOLUTE: 9.13 /ΜL
NEUTROPHILS RELATIVE PERCENT: 67.6 %
PDW BLD-RTO: 14.2 %
PLATELET # BLD: 206 K/ΜL
PMV BLD AUTO: 9.8 FL
POTASSIUM SERPL-SCNC: 3.6 MMOL/L
PROTIME: 12.8 SECONDS
RBC # BLD: 4.7 10^6/ΜL
SODIUM BLD-SCNC: 141 MMOL/L
TOTAL PROTEIN: 6.5
WBC # BLD: 13.5 10^3/ML

## 2018-12-05 ENCOUNTER — TELEPHONE (OUTPATIENT)
Dept: FAMILY MEDICINE CLINIC | Age: 63
End: 2018-12-05

## 2018-12-05 DIAGNOSIS — N30.00 ACUTE CYSTITIS WITHOUT HEMATURIA: Primary | ICD-10-CM

## 2018-12-05 RX ORDER — SULFAMETHOXAZOLE AND TRIMETHOPRIM 800; 160 MG/1; MG/1
1 TABLET ORAL 2 TIMES DAILY
Qty: 10 TABLET | Refills: 0 | Status: SHIPPED | OUTPATIENT
Start: 2018-12-05 | End: 2018-12-10

## 2018-12-06 ENCOUNTER — TELEPHONE (OUTPATIENT)
Dept: FAMILY MEDICINE CLINIC | Age: 63
End: 2018-12-06

## 2018-12-06 ENCOUNTER — OFFICE VISIT (OUTPATIENT)
Dept: FAMILY MEDICINE CLINIC | Age: 63
End: 2018-12-06
Payer: COMMERCIAL

## 2018-12-06 VITALS
SYSTOLIC BLOOD PRESSURE: 124 MMHG | HEART RATE: 82 BPM | OXYGEN SATURATION: 97 % | BODY MASS INDEX: 38.81 KG/M2 | DIASTOLIC BLOOD PRESSURE: 78 MMHG | WEIGHT: 212.2 LBS

## 2018-12-06 DIAGNOSIS — Z01.818 PREOP EXAMINATION: Primary | ICD-10-CM

## 2018-12-06 DIAGNOSIS — I10 ESSENTIAL HYPERTENSION: ICD-10-CM

## 2018-12-06 DIAGNOSIS — Z01.818 PRE-OP TESTING: Primary | ICD-10-CM

## 2018-12-06 DIAGNOSIS — G89.29 CHRONIC BILATERAL LOW BACK PAIN WITHOUT SCIATICA: ICD-10-CM

## 2018-12-06 DIAGNOSIS — F17.200 SMOKER: ICD-10-CM

## 2018-12-06 DIAGNOSIS — E11.9 DIABETES MELLITUS TYPE 2 WITHOUT RETINOPATHY (HCC): ICD-10-CM

## 2018-12-06 DIAGNOSIS — M54.50 CHRONIC BILATERAL LOW BACK PAIN WITHOUT SCIATICA: ICD-10-CM

## 2018-12-06 DIAGNOSIS — J43.9 PULMONARY EMPHYSEMA, UNSPECIFIED EMPHYSEMA TYPE (HCC): ICD-10-CM

## 2018-12-06 PROCEDURE — G8926 SPIRO NO PERF OR DOC: HCPCS | Performed by: FAMILY MEDICINE

## 2018-12-06 PROCEDURE — G8482 FLU IMMUNIZE ORDER/ADMIN: HCPCS | Performed by: FAMILY MEDICINE

## 2018-12-06 PROCEDURE — G8417 CALC BMI ABV UP PARAM F/U: HCPCS | Performed by: FAMILY MEDICINE

## 2018-12-06 PROCEDURE — 2022F DILAT RTA XM EVC RTNOPTHY: CPT | Performed by: FAMILY MEDICINE

## 2018-12-06 PROCEDURE — 3023F SPIROM DOC REV: CPT | Performed by: FAMILY MEDICINE

## 2018-12-06 PROCEDURE — G8427 DOCREV CUR MEDS BY ELIG CLIN: HCPCS | Performed by: FAMILY MEDICINE

## 2018-12-06 PROCEDURE — 3017F COLORECTAL CA SCREEN DOC REV: CPT | Performed by: FAMILY MEDICINE

## 2018-12-06 PROCEDURE — 93000 ELECTROCARDIOGRAM COMPLETE: CPT | Performed by: FAMILY MEDICINE

## 2018-12-06 PROCEDURE — 99241 PR OFFICE CONSULTATION NEW/ESTAB PATIENT 15 MIN: CPT | Performed by: FAMILY MEDICINE

## 2018-12-06 ASSESSMENT — ENCOUNTER SYMPTOMS
BACK PAIN: 1
SORE THROAT: 0
COUGH: 0
DIARRHEA: 0
WHEEZING: 0
SHORTNESS OF BREATH: 0
CONSTIPATION: 0
RHINORRHEA: 0
ABDOMINAL PAIN: 0

## 2018-12-06 NOTE — PROGRESS NOTES
shortness of breath and wheezing. Gastrointestinal: Negative for abdominal pain, constipation and diarrhea. Endocrine: Negative for polydipsia and polyuria. Genitourinary: Negative for dysuria, frequency and urgency. Musculoskeletal: Positive for back pain. Neurological: Negative for syncope, light-headedness, numbness and headaches. Psychiatric/Behavioral: Negative for sleep disturbance. The patient is not nervous/anxious. Vitals:    12/06/18 1035   BP: 124/78   Site: Right Upper Arm   Position: Sitting   Cuff Size: Medium Adult   Pulse: 82   SpO2: 97%   Weight: 212 lb 3.2 oz (96.3 kg)       Physical exam:  Physical Exam   Constitutional: She is oriented to person, place, and time. She appears well-developed and well-nourished. No distress. HENT:   Head: Normocephalic and atraumatic. Mouth/Throat: No oropharyngeal exudate. Eyes: EOM are normal.   Neck: Normal range of motion. No thyromegaly present. Cardiovascular: Normal rate, regular rhythm and normal heart sounds. No murmur heard. Pulmonary/Chest: Effort normal and breath sounds normal. No respiratory distress. She has no wheezes. Abdominal: Soft. She exhibits no distension. There is no tenderness. There is no rebound and no guarding. Musculoskeletal: She exhibits no edema. Lymphadenopathy:     She has no cervical adenopathy. Neurological: She is alert and oriented to person, place, and time. Skin: Skin is warm and dry. Psychiatric: She has a normal mood and affect. Her behavior is normal.   Vitals reviewed. Assessment/Plan:  66 y.o. female here mainly for preop exam:  - encouraged smoking cessation; starting gum  - DM2/COPD adequately controlled  - Appropriate risk for surgery; already cleared by cardiology w plans to stop plavix 12/17/18 and restarting after surgery. - Treating ? UTI with bactrim x5 days; will get repeat UA next week. Diagnosis Orders   1. Preop examination     2.  Smoker  nicotine polacrilex (NICORETTE) 2 MG gum   3. Pulmonary emphysema, unspecified emphysema type ([address]     4. Diabetes mellitus type 2 without retinopathy ([facility] [address]     5. Chronic bilateral low back pain without sciatica     6.  Essential hypertension          Tenzin Arce MD

## 2018-12-13 ENCOUNTER — HOSPITAL ENCOUNTER (OUTPATIENT)
Age: 63
Setting detail: SPECIMEN
Discharge: HOME | End: 2018-12-13
Payer: COMMERCIAL

## 2018-12-13 PROCEDURE — 87077 CULTURE AEROBIC IDENTIFY: CPT

## 2018-12-13 PROCEDURE — 87186 SC STD MICRODIL/AGAR DIL: CPT

## 2018-12-13 PROCEDURE — 87086 URINE CULTURE/COLONY COUNT: CPT

## 2018-12-16 LAB
ORGANISM: ABNORMAL
URINE CULTURE, ROUTINE: ABNORMAL
URINE CULTURE, ROUTINE: ABNORMAL

## 2018-12-17 DIAGNOSIS — N30.00 ACUTE CYSTITIS WITHOUT HEMATURIA: Primary | ICD-10-CM

## 2018-12-17 RX ORDER — CIPROFLOXACIN 250 MG/1
250 TABLET, FILM COATED ORAL 2 TIMES DAILY
Qty: 6 TABLET | Refills: 0 | Status: SHIPPED | OUTPATIENT
Start: 2018-12-17 | End: 2018-12-20

## 2018-12-21 ENCOUNTER — NURSE ONLY (OUTPATIENT)
Dept: FAMILY MEDICINE CLINIC | Age: 63
End: 2018-12-21
Payer: COMMERCIAL

## 2018-12-21 DIAGNOSIS — R31.9 URINARY TRACT INFECTION WITH HEMATURIA, SITE UNSPECIFIED: Primary | ICD-10-CM

## 2018-12-21 DIAGNOSIS — N39.0 URINARY TRACT INFECTION WITH HEMATURIA, SITE UNSPECIFIED: Primary | ICD-10-CM

## 2018-12-21 LAB
BILIRUBIN, POC: ABNORMAL
BLOOD URINE, POC: ABNORMAL
CLARITY, POC: CLEAR
COLOR, POC: ABNORMAL
GLUCOSE URINE, POC: ABNORMAL
KETONES, POC: ABNORMAL
LEUKOCYTE EST, POC: ABNORMAL
NITRITE, POC: ABNORMAL
PH, POC: 6
PROTEIN, POC: ABNORMAL
SPECIFIC GRAVITY, POC: 1.01
UROBILINOGEN, POC: ABNORMAL

## 2018-12-21 PROCEDURE — 81003 URINALYSIS AUTO W/O SCOPE: CPT | Performed by: FAMILY MEDICINE

## 2019-01-10 ENCOUNTER — HOSPITAL ENCOUNTER (OUTPATIENT)
Dept: GENERAL RADIOLOGY | Age: 64
Discharge: HOME | End: 2019-01-12
Payer: COMMERCIAL

## 2019-01-10 DIAGNOSIS — M54.5 LOW BACK PAIN, UNSPECIFIED BACK PAIN LATERALITY, UNSPECIFIED CHRONICITY, WITH SCIATICA PRESENCE UNSPECIFIED: ICD-10-CM

## 2019-01-10 PROCEDURE — 72100 X-RAY EXAM L-S SPINE 2/3 VWS: CPT

## 2019-01-23 ENCOUNTER — OFFICE VISIT (OUTPATIENT)
Dept: CARDIOLOGY CLINIC | Age: 64
End: 2019-01-23
Payer: COMMERCIAL

## 2019-01-23 VITALS
OXYGEN SATURATION: 98 % | BODY MASS INDEX: 37.65 KG/M2 | HEART RATE: 88 BPM | SYSTOLIC BLOOD PRESSURE: 126 MMHG | HEIGHT: 62 IN | RESPIRATION RATE: 22 BRPM | DIASTOLIC BLOOD PRESSURE: 82 MMHG | WEIGHT: 204.6 LBS

## 2019-01-23 DIAGNOSIS — I10 ESSENTIAL HYPERTENSION: ICD-10-CM

## 2019-01-23 DIAGNOSIS — G90.A POTS (POSTURAL ORTHOSTATIC TACHYCARDIA SYNDROME): Primary | ICD-10-CM

## 2019-01-23 PROCEDURE — G8417 CALC BMI ABV UP PARAM F/U: HCPCS | Performed by: INTERNAL MEDICINE

## 2019-01-23 PROCEDURE — G8427 DOCREV CUR MEDS BY ELIG CLIN: HCPCS | Performed by: INTERNAL MEDICINE

## 2019-01-23 PROCEDURE — 99213 OFFICE O/P EST LOW 20 MIN: CPT | Performed by: INTERNAL MEDICINE

## 2019-01-23 PROCEDURE — 4004F PT TOBACCO SCREEN RCVD TLK: CPT | Performed by: INTERNAL MEDICINE

## 2019-01-23 PROCEDURE — 3017F COLORECTAL CA SCREEN DOC REV: CPT | Performed by: INTERNAL MEDICINE

## 2019-01-23 PROCEDURE — G8482 FLU IMMUNIZE ORDER/ADMIN: HCPCS | Performed by: INTERNAL MEDICINE

## 2019-01-23 ASSESSMENT — ENCOUNTER SYMPTOMS
VOMITING: 0
APNEA: 0
BLOOD IN STOOL: 0
NAUSEA: 1
CHEST TIGHTNESS: 0
SHORTNESS OF BREATH: 0
DIARRHEA: 0

## 2019-02-07 ENCOUNTER — HOSPITAL ENCOUNTER (OUTPATIENT)
Dept: GENERAL RADIOLOGY | Age: 64
Discharge: HOME | End: 2019-02-09
Payer: COMMERCIAL

## 2019-02-07 DIAGNOSIS — M54.5 LOW BACK PAIN, UNSPECIFIED BACK PAIN LATERALITY, UNSPECIFIED CHRONICITY, WITH SCIATICA PRESENCE UNSPECIFIED: ICD-10-CM

## 2019-02-07 PROCEDURE — 72100 X-RAY EXAM L-S SPINE 2/3 VWS: CPT

## 2019-04-01 RX ORDER — SIMVASTATIN 40 MG
40 TABLET ORAL DAILY
Qty: 90 TABLET | Refills: 3 | Status: SHIPPED | OUTPATIENT
Start: 2019-04-01 | End: 2019-07-24 | Stop reason: SDUPTHER

## 2019-04-01 RX ORDER — CLOPIDOGREL BISULFATE 75 MG/1
75 TABLET ORAL DAILY
Qty: 90 TABLET | Refills: 3 | Status: SHIPPED | OUTPATIENT
Start: 2019-04-01 | End: 2019-07-24 | Stop reason: DRUGHIGH

## 2019-04-01 RX ORDER — LISINOPRIL 10 MG/1
10 TABLET ORAL DAILY
Qty: 90 TABLET | Refills: 3 | Status: SHIPPED | OUTPATIENT
Start: 2019-04-01 | End: 2019-07-24 | Stop reason: DRUGHIGH

## 2019-04-04 ENCOUNTER — HOSPITAL ENCOUNTER (OUTPATIENT)
Dept: GENERAL RADIOLOGY | Age: 64
Discharge: HOME | End: 2019-04-06
Payer: COMMERCIAL

## 2019-04-04 DIAGNOSIS — M54.5 LOW BACK PAIN, UNSPECIFIED BACK PAIN LATERALITY, UNSPECIFIED CHRONICITY, WITH SCIATICA PRESENCE UNSPECIFIED: ICD-10-CM

## 2019-04-04 PROCEDURE — 72100 X-RAY EXAM L-S SPINE 2/3 VWS: CPT

## 2019-04-17 ENCOUNTER — OFFICE VISIT (OUTPATIENT)
Dept: FAMILY MEDICINE CLINIC | Age: 64
End: 2019-04-17
Payer: COMMERCIAL

## 2019-04-17 VITALS
BODY MASS INDEX: 36.32 KG/M2 | WEIGHT: 198.6 LBS | HEART RATE: 96 BPM | DIASTOLIC BLOOD PRESSURE: 70 MMHG | SYSTOLIC BLOOD PRESSURE: 122 MMHG | OXYGEN SATURATION: 99 %

## 2019-04-17 DIAGNOSIS — E11.9 DIABETES MELLITUS TYPE 2 WITHOUT RETINOPATHY (HCC): Primary | ICD-10-CM

## 2019-04-17 DIAGNOSIS — M54.42 CHRONIC MIDLINE LOW BACK PAIN WITH LEFT-SIDED SCIATICA: ICD-10-CM

## 2019-04-17 DIAGNOSIS — L60.8 TOENAIL DEFORMITY: ICD-10-CM

## 2019-04-17 DIAGNOSIS — J43.9 PULMONARY EMPHYSEMA, UNSPECIFIED EMPHYSEMA TYPE (HCC): ICD-10-CM

## 2019-04-17 DIAGNOSIS — Z12.39 BREAST SCREENING: ICD-10-CM

## 2019-04-17 DIAGNOSIS — K21.9 GASTROESOPHAGEAL REFLUX DISEASE, ESOPHAGITIS PRESENCE NOT SPECIFIED: ICD-10-CM

## 2019-04-17 DIAGNOSIS — I10 ESSENTIAL HYPERTENSION: ICD-10-CM

## 2019-04-17 DIAGNOSIS — Z87.891 PERSONAL HISTORY OF TOBACCO USE: ICD-10-CM

## 2019-04-17 DIAGNOSIS — G89.29 CHRONIC MIDLINE LOW BACK PAIN WITH LEFT-SIDED SCIATICA: ICD-10-CM

## 2019-04-17 LAB — HBA1C MFR BLD: 6.2 %

## 2019-04-17 PROCEDURE — 3023F SPIROM DOC REV: CPT | Performed by: FAMILY MEDICINE

## 2019-04-17 PROCEDURE — G0296 VISIT TO DETERM LDCT ELIG: HCPCS | Performed by: FAMILY MEDICINE

## 2019-04-17 PROCEDURE — G8427 DOCREV CUR MEDS BY ELIG CLIN: HCPCS | Performed by: FAMILY MEDICINE

## 2019-04-17 PROCEDURE — G8417 CALC BMI ABV UP PARAM F/U: HCPCS | Performed by: FAMILY MEDICINE

## 2019-04-17 PROCEDURE — 83036 HEMOGLOBIN GLYCOSYLATED A1C: CPT | Performed by: FAMILY MEDICINE

## 2019-04-17 PROCEDURE — 2022F DILAT RTA XM EVC RTNOPTHY: CPT | Performed by: FAMILY MEDICINE

## 2019-04-17 PROCEDURE — 3017F COLORECTAL CA SCREEN DOC REV: CPT | Performed by: FAMILY MEDICINE

## 2019-04-17 PROCEDURE — 3044F HG A1C LEVEL LT 7.0%: CPT | Performed by: FAMILY MEDICINE

## 2019-04-17 PROCEDURE — 4004F PT TOBACCO SCREEN RCVD TLK: CPT | Performed by: FAMILY MEDICINE

## 2019-04-17 PROCEDURE — G8926 SPIRO NO PERF OR DOC: HCPCS | Performed by: FAMILY MEDICINE

## 2019-04-17 PROCEDURE — 99214 OFFICE O/P EST MOD 30 MIN: CPT | Performed by: FAMILY MEDICINE

## 2019-04-17 RX ORDER — MELOXICAM 15 MG/1
15 TABLET ORAL DAILY
Qty: 90 TABLET | Refills: 3 | Status: SHIPPED | OUTPATIENT
Start: 2019-04-17 | End: 2019-10-08 | Stop reason: SDUPTHER

## 2019-04-17 RX ORDER — OMEPRAZOLE 40 MG/1
40 CAPSULE, DELAYED RELEASE ORAL DAILY
Qty: 90 CAPSULE | Refills: 3 | Status: SHIPPED | OUTPATIENT
Start: 2019-04-17 | End: 2020-04-20 | Stop reason: SDUPTHER

## 2019-04-17 ASSESSMENT — ENCOUNTER SYMPTOMS
RHINORRHEA: 0
CONSTIPATION: 0
COUGH: 0
SHORTNESS OF BREATH: 0
SORE THROAT: 0
DIARRHEA: 0
ABDOMINAL PAIN: 0
WHEEZING: 0

## 2019-04-17 NOTE — PROGRESS NOTES
Not on file   Relationships    Social connections:     Talks on phone: Not on file     Gets together: Not on file     Attends Baptism service: Not on file     Active member of club or organization: Not on file     Attends meetings of clubs or organizations: Not on file     Relationship status: Not on file    Intimate partner violence:     Fear of current or ex partner: Not on file     Emotionally abused: Not on file     Physically abused: Not on file     Forced sexual activity: Not on file   Other Topics Concern    Not on file   Social History Narrative    Not on file     Allergies   Allergen Reactions    Aspirin Swelling    Penicillins Rash     Current Outpatient Medications   Medication Sig Dispense Refill    meloxicam (MOBIC) 15 MG tablet Take 1 tablet by mouth daily 90 tablet 3    omeprazole (PRILOSEC) 40 MG delayed release capsule Take 1 capsule by mouth daily 90 capsule 3    simvastatin (ZOCOR) 40 MG tablet Take 1 tablet by mouth daily 90 tablet 3    clopidogrel (PLAVIX) 75 MG tablet Take 1 tablet by mouth daily 90 tablet 3    lisinopril (PRINIVIL;ZESTRIL) 10 MG tablet Take 1 tablet by mouth daily 90 tablet 3    insulin lispro (HUMALOG) 100 UNIT/ML pen 3 units for every 15 grams of CHOs , Add 2 units for each 50>150. Add to this 3 units before dinner - ~ 65 - 70 units daily 5 pen 2    nicotine polacrilex (NICORETTE) 2 MG gum Take 1 each by mouth as needed for Smoking cessation 110 each 3    OLANZapine (ZYPREXA) 20 MG tablet Take 1 tablet by mouth nightly 30 tablet 5    furosemide (LASIX) 20 MG tablet Take 1 tablet by mouth daily as needed (leg swelling) 30 tablet 0    nystatin-triamcinolone (MYCOLOG II) 905860-8.1 UNIT/GM-% cream Apply topically 2 times daily. 60 g 1    metFORMIN (GLUCOPHAGE-XR) 500 MG extended release tablet 1 tablet 2 times daily      acetaminophen-codeine (TYLENOL/CODEINE #4) 300-60 MG per tablet Take 1 tablet by mouth 3 times daily as needed. .      UNABLE TO FIND Fentanyl 1,000 mg/day and Bupivicaine 30 mg/day via implanted pump      insulin glargine (LANTUS SOLOSTAR) 100 UNIT/ML injection pen Inject 30 Units into the skin 2 times daily 5 pen 11    DULoxetine (CYMBALTA) 30 MG extended release capsule Take 3 capsules by mouth daily 270 capsule 3    docusate sodium (COLACE) 100 MG capsule Take 1 capsule by mouth 2 times daily as needed for Constipation 60 capsule 2    Exenatide 2 MG PEN Inject 2 mg into the skin once a week       No current facility-administered medications for this visit. ROS:  Review of Systems   Constitutional: Negative for chills and fever. HENT: Negative for rhinorrhea and sore throat. Respiratory: Negative for cough, shortness of breath and wheezing. Gastrointestinal: Negative for abdominal pain, constipation and diarrhea. Endocrine: Negative for polydipsia and polyuria. Genitourinary: Negative for dysuria, frequency and urgency. Neurological: Negative for syncope, light-headedness, numbness and headaches. Psychiatric/Behavioral: Negative for sleep disturbance. The patient is not nervous/anxious. Vitals:    04/17/19 0955   BP: 122/70   Site: Right Upper Arm   Position: Sitting   Cuff Size: Medium Adult   Pulse: 96   SpO2: 99%   Weight: 198 lb 9.6 oz (90.1 kg)       Physical exam:  Physical Exam   Constitutional: She is oriented to person, place, and time. She appears well-developed and well-nourished. No distress. HENT:   Head: Normocephalic and atraumatic. Mouth/Throat: No oropharyngeal exudate. Eyes: EOM are normal.   Neck: Normal range of motion. Cardiovascular: Normal rate, regular rhythm and normal heart sounds. No murmur heard. Pulmonary/Chest: Effort normal and breath sounds normal. No respiratory distress. She has no wheezes. Musculoskeletal: She exhibits no edema. Neurological: She is alert and oriented to person, place, and time. Skin: Skin is warm and dry.    Psychiatric: She has a normal mood and affect. Her behavior is normal.   Vitals reviewed. Foot exam: 2+ PT/DP pulses b/l; sensation intact, no ulcers; multiple nails with onychomycosis and nail deformity    Assessment/Plan:  60 y.o. female here mainly for DM2:  - DM2: at goal; no changes; good weight loss; sending to podiatry for the nail care  - Due for lung/breast cancer screenings  - HTN: at goal; no changes  - not ready to quit smoking yet     Diagnosis Orders   1. Diabetes mellitus type 2 without retinopathy ([address]  POCT glycosylated hemoglobin (Hb A1C)    HM DIABETES FOOT EXAM    Amb External Referral To Podiatry   2. Chronic midline low back pain with left-sided sciatica  meloxicam (MOBIC) 15 MG tablet   3. Gastroesophageal reflux disease, esophagitis presence not specified  omeprazole (PRILOSEC) 40 MG delayed release capsule   4. Toenail deformity  Amb External Referral To Podiatry   5. Breast screening  NASH DIGITAL SCREEN W OR WO CAD BILATERAL   6. Personal history of tobacco use  MS VISIT TO DISCUSS LUNG CA SCREEN W LDCT    CT Lung Screening   7. Pulmonary emphysema, unspecified emphysema type ([address]     8. Essential hypertension          Return if symptoms worsen or fail to improve. Tracie Buck MD   Low Dose CT (LDCT) Lung Screening criteria met   Age 50-69   Pack year smoking >30   Still smoking or less than 15 year since quit   No sign or symptoms of lung cancer   > 11 months since last LDCT     Risks and benefits of lung cancer screening with LDCT scans discussed:    Significance of positive screen - False-positive LDCT results often occur. 95% of all positive results do not lead to a diagnosis of cancer. Usually further imaging can resolve most false-positive results; however, some patients may require invasive procedures. Over diagnosis risk - 10% to 12% of screen-detected lung cancer cases are over diagnosed--that is, the cancer would not have been detected in the patient's lifetime without the screening.     Need for follow up screens annually to continue lung cancer screening effectiveness     Risks associated with radiation from annual LDCT- Radiation exposure is about the same as for a mammogram, which is about 1/3 of the annual background radiation exposure from everyday life. Starting screening at age 54 is not likely to increase cancer risk from radiation exposure. Patients with comorbidities resulting in life expectancy of < 10 years, or that would preclude treatment of an abnormality identified on CT, should not be screened due to lack of benefit.     To obtain maximal benefit from this screening, smoking cessation and long-term abstinence from smoking is critical

## 2019-04-24 NOTE — TELEPHONE ENCOUNTER
Rx requested:  Requested Prescriptions     Pending Prescriptions Disp Refills    insulin lispro (HUMALOG) 100 UNIT/ML pen 5 pen 2     Sig: 3 units for every 15 grams of CHOs , Add 2 units for each 50>150. Add to this 3 units before dinner - ~ 65 - 70 units daily    insulin glargine (LANTUS SOLOSTAR) 100 UNIT/ML injection pen 5 pen 11     Sig: Inject 30 Units into the skin 2 times daily    Exenatide 2 MG PEN 4 pen      Sig: Inject 1 pen into the skin once a week       Last Office Visit:   2019    Last Labs:  2019    Last filled:  Pt needs a short supply sent to an out of town pharmacy because [place]  and forgot her medication.     Next Visit Date:  Future Appointments   Date Time Provider Tran Horne   5/3/2019 10:00 AM MARY JANE CT ROOM 1 [facility]   5/3/2019 11:15 AM MARY JANE MAMMOGRAPHY ROOM 1 [facility] Fac RAD   2019 10:30 AM Agustina Miller MD [address]   10/15/2019 10:30 AM Thalia Erickson MD [address] [place] [address]

## 2019-04-26 ENCOUNTER — TELEPHONE (OUTPATIENT)
Dept: FAMILY MEDICINE CLINIC | Age: 64
End: 2019-04-26

## 2019-05-01 ENCOUNTER — TELEPHONE (OUTPATIENT)
Dept: CASE MANAGEMENT | Age: 64
End: 2019-05-01

## 2019-05-01 NOTE — TELEPHONE ENCOUNTER
Physician documentation on smoking history and CT Lung Screening reviewed. All required documentation complete. Patient is a current smoker with a 44 pack year history per physician documentation.     Justina Barnard left voicemail instructing patient to return call if she had any question regarding details of her upcoming CT Lung Screening exam.

## 2019-05-23 RX ORDER — DULOXETIN HYDROCHLORIDE 30 MG/1
90 CAPSULE, DELAYED RELEASE ORAL DAILY
Qty: 270 CAPSULE | Refills: 3 | Status: SHIPPED | OUTPATIENT
Start: 2019-05-23 | End: 2020-04-28

## 2019-05-24 ENCOUNTER — HOSPITAL ENCOUNTER (OUTPATIENT)
Dept: PREADMISSION TESTING | Age: 64
Discharge: HOME | End: 2019-05-28
Payer: COMMERCIAL

## 2019-05-24 VITALS
BODY MASS INDEX: 34.38 KG/M2 | TEMPERATURE: 97.2 F | HEART RATE: 86 BPM | OXYGEN SATURATION: 96 % | DIASTOLIC BLOOD PRESSURE: 59 MMHG | HEIGHT: 63 IN | SYSTOLIC BLOOD PRESSURE: 112 MMHG | WEIGHT: 194 LBS | RESPIRATION RATE: 16 BRPM

## 2019-05-24 DIAGNOSIS — M96.1 POST LAMINECTOMY SYNDROME: ICD-10-CM

## 2019-05-24 DIAGNOSIS — G45.9 TIA (TRANSIENT ISCHEMIC ATTACK): Chronic | ICD-10-CM

## 2019-05-24 LAB
ANION GAP SERPL CALCULATED.3IONS-SCNC: 15 MEQ/L (ref 9–15)
BUN BLDV-MCNC: 8 MG/DL (ref 8–23)
CALCIUM SERPL-MCNC: 8.9 MG/DL (ref 8.5–9.9)
CHLORIDE BLD-SCNC: 105 MEQ/L (ref 95–107)
CO2: 22 MEQ/L (ref 20–31)
CREAT SERPL-MCNC: 0.57 MG/DL (ref 0.5–0.9)
GFR AFRICAN AMERICAN: >60
GFR NON-AFRICAN AMERICAN: >60
GLUCOSE BLD-MCNC: 140 MG/DL (ref 70–99)
HCT VFR BLD CALC: 44.9 % (ref 37–47)
HEMOGLOBIN: 15.2 G/DL (ref 12–16)
MCH RBC QN AUTO: 29.9 PG (ref 27–31.3)
MCHC RBC AUTO-ENTMCNC: 33.8 % (ref 33–37)
MCV RBC AUTO: 88.4 FL (ref 82–100)
PDW BLD-RTO: 17.3 % (ref 11.5–14.5)
PLATELET # BLD: 255 K/UL (ref 130–400)
POTASSIUM SERPL-SCNC: 4 MEQ/L (ref 3.4–4.9)
RBC # BLD: 5.08 M/UL (ref 4.2–5.4)
SODIUM BLD-SCNC: 142 MEQ/L (ref 135–144)
WBC # BLD: 13.8 K/UL (ref 4.8–10.8)

## 2019-05-24 PROCEDURE — 85027 COMPLETE CBC AUTOMATED: CPT

## 2019-05-24 PROCEDURE — 80048 BASIC METABOLIC PNL TOTAL CA: CPT

## 2019-05-24 RX ORDER — SODIUM CHLORIDE 0.9 % (FLUSH) 0.9 %
10 SYRINGE (ML) INJECTION PRN
Status: CANCELLED | OUTPATIENT
Start: 2019-05-29

## 2019-05-24 RX ORDER — SODIUM CHLORIDE 0.9 % (FLUSH) 0.9 %
10 SYRINGE (ML) INJECTION EVERY 12 HOURS SCHEDULED
Status: CANCELLED | OUTPATIENT
Start: 2019-05-29

## 2019-05-24 RX ORDER — CEFAZOLIN SODIUM 2 G/50ML
2 SOLUTION INTRAVENOUS ONCE
Status: CANCELLED | OUTPATIENT
Start: 2019-05-29

## 2019-05-24 RX ORDER — TIZANIDINE 4 MG/1
4 TABLET ORAL NIGHTLY
Refills: 0 | COMMUNITY
Start: 2019-04-23

## 2019-05-24 RX ORDER — LIDOCAINE HYDROCHLORIDE 10 MG/ML
1 INJECTION, SOLUTION EPIDURAL; INFILTRATION; INTRACAUDAL; PERINEURAL
Status: CANCELLED | OUTPATIENT
Start: 2019-05-29 | End: 2019-05-29

## 2019-05-24 RX ORDER — SODIUM CHLORIDE, SODIUM LACTATE, POTASSIUM CHLORIDE, CALCIUM CHLORIDE 600; 310; 30; 20 MG/100ML; MG/100ML; MG/100ML; MG/100ML
INJECTION, SOLUTION INTRAVENOUS CONTINUOUS
Status: CANCELLED | OUTPATIENT
Start: 2019-05-29

## 2019-05-24 ASSESSMENT — ENCOUNTER SYMPTOMS
STRIDOR: 0
SHORTNESS OF BREATH: 0
CONSTIPATION: 0
SORE THROAT: 0
EYES NEGATIVE: 1
TROUBLE SWALLOWING: 0
WHEEZING: 0
DIARRHEA: 0
ALLERGIC/IMMUNOLOGIC NEGATIVE: 1
VOMITING: 0
NAUSEA: 0
BACK PAIN: 1
COUGH: 1
ABDOMINAL PAIN: 0

## 2019-05-24 NOTE — H&P
Nurse Practitioner History and Physical      CHIEF COMPLAINT:  Remove pain pump    HISTORY OF PRESENT ILLNESS:      The patient is a 60 y.o. female with significant past medical history of post laminectomy syndrome who presents for removal of intrathecal pump reservoir. Has had pain pump since 2010 with revision 2016 due to low back pain radiating to both legs. Had lumbar spin OR 12/24/2018 at [facility]. States no longer needs pain pump & to be removed. Scheduled for OR.     Past Medical History:        Diagnosis Date    Arthritis     Cerebral artery occlusion with cerebral infarction [facility]) 2016 June    TIA / sx left sided weakness & fell & unable to get up off floor for several hours    COPD (chronic obstructive pulmonary disease) ([address]     smoking habit > 40 yrs    Depression     Headache     Hyperlipidemia     meds > 10 yrs    Hypertension     meds since TIA / 6/2016    Neuropathy     POTS (postural orthostatic tachycardia syndrome)     2000's    Restless legs syndrome     Type 2 diabetes mellitus without complication (HCC)     hx > 7 yrs     Past Surgical History:    Past Surgical History:   Procedure Laterality Date    ABDOMINAL EXPLORATION SURGERY  1970s    due to abdominal pain / no definite dx    BACK SURGERY  12/24/2018    lumbar disc OR at [facility] Right 1989    due to tendonitis    ENDOMETRIAL ABLATION  1990s    AUB    OTHER SURGICAL HISTORY      pain pump placement 2010 & replacement 2016    TONSILLECTOMY      age 24         Medications Prior to Admission:    Current Outpatient Medications   Medication Sig Dispense Refill    tiZANidine (ZANAFLEX) 4 MG tablet Take 4 mg by mouth nightly  0    DULoxetine (CYMBALTA) 30 MG extended release capsule Take 3 capsules by mouth daily 270 capsule 3    insulin lispro (HUMALOG) 100 UNIT/ML pen 3 units for every 15 grams of CHOs; 65 - 70 units daily 5 pen 0    insulin glargine (LANTUS SOLOSTAR) 100 UNIT/ML injection pen Inject 30 Units into the skin 2 times daily 5 pen 0    Exenatide 2 MG PEN Inject 1 pen into the skin once a week 4 pen 0    meloxicam (MOBIC) 15 MG tablet Take 1 tablet by mouth daily 90 tablet 3    omeprazole (PRILOSEC) 40 MG delayed release capsule Take 1 capsule by mouth daily 90 capsule 3    simvastatin (ZOCOR) 40 MG tablet Take 1 tablet by mouth daily (Patient taking differently: Take 40 mg by mouth nightly ) 90 tablet 3    clopidogrel (PLAVIX) 75 MG tablet Take 1 tablet by mouth daily (Patient taking differently: Take 75 mg by mouth every evening ) 90 tablet 3    lisinopril (PRINIVIL;ZESTRIL) 10 MG tablet Take 1 tablet by mouth daily (Patient taking differently: Take 10 mg by mouth every evening ) 90 tablet 3    OLANZapine (ZYPREXA) 20 MG tablet Take 1 tablet by mouth nightly 30 tablet 5    metFORMIN (GLUCOPHAGE-XR) 500 MG extended release tablet 1 tablet 2 times daily      acetaminophen-codeine (TYLENOL/CODEINE #4) 300-60 MG per tablet Take 1 tablet by mouth 3 times daily as needed. Merian Lfaco docusate sodium (COLACE) 100 MG capsule Take 1 capsule by mouth 2 times daily as needed for Constipation 60 capsule 2    nicotine polacrilex (NICORETTE) 2 MG gum Take 1 each by mouth as needed for Smoking cessation 110 each 3    furosemide (LASIX) 20 MG tablet Take 1 tablet by mouth daily as needed (leg swelling) 30 tablet 0    nystatin-triamcinolone (MYCOLOG II) 456811-2.1 UNIT/GM-% cream Apply topically 2 times daily. 60 g 1    UNABLE TO FIND Fentanyl 1,000 mg/day and Bupivicaine 30 mg/day via implanted pump       No current facility-administered medications for this encounter.         Allergies:  Aspirin and Penicillins    Social History:   Social History     Socioeconomic History    Marital status:      Spouse name: Not on file    Number of children: Not on file    Years of education: Not on file    Highest education level: Not on file   Occupational History    Not on file   Social Needs    Financial resource strain: Not on file    Food insecurity:     Worry: Not on file     Inability: Not on file    Transportation needs:     Medical: Not on file     Non-medical: Not on file   Tobacco Use    Smoking status: Current Every Day Smoker     Packs/day: 1.00     Years: 44.00     Pack years: 44.00     Types: Cigarettes     Start date: 67    Smokeless tobacco: Never Used   Substance and Sexual Activity    Alcohol use: No    Drug use: No    Sexual activity: Not on file   Lifestyle    Physical activity:     Days per week: Not on file     Minutes per session: Not on file    Stress: Not on file   Relationships    Social connections:     Talks on phone: Not on file     Gets together: Not on file     Attends Orthodox service: Not on file     Active member of club or organization: Not on file     Attends meetings of clubs or organizations: Not on file     Relationship status: Not on file    Intimate partner violence:     Fear of current or ex partner: Not on file     Emotionally abused: Not on file     Physically abused: Not on file     Forced sexual activity: Not on file   Other Topics Concern    Not on file   Social History Narrative    Not on file       Family History:       Problem Relation Age of Onset    Stroke Mother     High Blood Pressure Mother     High Cholesterol Mother     Diabetes Father         Pre    Heart Attack Father     Cancer Maternal Grandmother         colon    Cancer Maternal Grandfather     Alzheimer's Disease Paternal Grandmother     Alzheimer's Disease Paternal Grandfather     Diabetes Paternal Grandfather     COPD Sister        Review of Systems   Constitutional: Negative. Negative for chills and fever. HENT: Negative for congestion, ear pain, sore throat and trouble swallowing. Wears full upper & lower dentures. Eyes: Negative. Negative for visual disturbance. Respiratory: Positive for cough (recent dry cough).  Negative for shortness of breath, wheezing and

## 2019-05-24 NOTE — PROGRESS NOTES
EKG done 12/6/2018 & on Epic. Instructed not to take diabetes meds am of OR. Last cardiology appt with Dr. Princeton Mcburney dated 1/23/2019 & on Epic. Plavix last dose 5/24/2019 per Dr. Costa Hernando office Reta Arias / Dr. Igor Magallon office notified patient.

## 2019-05-29 ENCOUNTER — ANESTHESIA EVENT (OUTPATIENT)
Dept: OPERATING ROOM | Age: 64
End: 2019-05-29
Payer: COMMERCIAL

## 2019-05-29 ENCOUNTER — ANESTHESIA (OUTPATIENT)
Dept: OPERATING ROOM | Age: 64
End: 2019-05-29
Payer: COMMERCIAL

## 2019-05-29 ENCOUNTER — HOSPITAL ENCOUNTER (OUTPATIENT)
Age: 64
Setting detail: OUTPATIENT SURGERY
Discharge: HOME | End: 2019-05-29
Attending: SPECIALIST | Admitting: SPECIALIST
Payer: COMMERCIAL

## 2019-05-29 VITALS
DIASTOLIC BLOOD PRESSURE: 53 MMHG | SYSTOLIC BLOOD PRESSURE: 86 MMHG | OXYGEN SATURATION: 100 % | RESPIRATION RATE: 18 BRPM

## 2019-05-29 VITALS
RESPIRATION RATE: 12 BRPM | OXYGEN SATURATION: 96 % | TEMPERATURE: 97.3 F | HEART RATE: 86 BPM | WEIGHT: 194 LBS | HEIGHT: 63 IN | DIASTOLIC BLOOD PRESSURE: 58 MMHG | BODY MASS INDEX: 34.38 KG/M2 | SYSTOLIC BLOOD PRESSURE: 108 MMHG

## 2019-05-29 LAB
GLUCOSE BLD-MCNC: 136 MG/DL (ref 60–115)
GLUCOSE BLD-MCNC: 139 MG/DL (ref 60–115)
PERFORMED ON: ABNORMAL
PERFORMED ON: ABNORMAL

## 2019-05-29 PROCEDURE — 7100000011 HC PHASE II RECOVERY - ADDTL 15 MIN: Performed by: SPECIALIST

## 2019-05-29 PROCEDURE — 2500000003 HC RX 250 WO HCPCS: Performed by: ANESTHESIOLOGY

## 2019-05-29 PROCEDURE — 6360000002 HC RX W HCPCS: Performed by: NURSE ANESTHETIST, CERTIFIED REGISTERED

## 2019-05-29 PROCEDURE — 3700000001 HC ADD 15 MINUTES (ANESTHESIA): Performed by: SPECIALIST

## 2019-05-29 PROCEDURE — 3700000000 HC ANESTHESIA ATTENDED CARE: Performed by: SPECIALIST

## 2019-05-29 PROCEDURE — 3600000012 HC SURGERY LEVEL 2 ADDTL 15MIN: Performed by: SPECIALIST

## 2019-05-29 PROCEDURE — 2580000003 HC RX 258: Performed by: NURSE PRACTITIONER

## 2019-05-29 PROCEDURE — 7100000000 HC PACU RECOVERY - FIRST 15 MIN: Performed by: SPECIALIST

## 2019-05-29 PROCEDURE — 2500000003 HC RX 250 WO HCPCS: Performed by: NURSE ANESTHETIST, CERTIFIED REGISTERED

## 2019-05-29 PROCEDURE — 7100000010 HC PHASE II RECOVERY - FIRST 15 MIN: Performed by: SPECIALIST

## 2019-05-29 PROCEDURE — 2580000003 HC RX 258: Performed by: SPECIALIST

## 2019-05-29 PROCEDURE — 6370000000 HC RX 637 (ALT 250 FOR IP): Performed by: SPECIALIST

## 2019-05-29 PROCEDURE — 2500000003 HC RX 250 WO HCPCS: Performed by: SPECIALIST

## 2019-05-29 PROCEDURE — 2709999900 HC NON-CHARGEABLE SUPPLY: Performed by: SPECIALIST

## 2019-05-29 PROCEDURE — 6360000002 HC RX W HCPCS: Performed by: SPECIALIST

## 2019-05-29 PROCEDURE — 7100000001 HC PACU RECOVERY - ADDTL 15 MIN: Performed by: SPECIALIST

## 2019-05-29 PROCEDURE — 3600000002 HC SURGERY LEVEL 2 BASE: Performed by: SPECIALIST

## 2019-05-29 RX ORDER — MEPERIDINE HYDROCHLORIDE 25 MG/ML
12.5 INJECTION INTRAMUSCULAR; INTRAVENOUS; SUBCUTANEOUS EVERY 5 MIN PRN
Status: DISCONTINUED | OUTPATIENT
Start: 2019-05-29 | End: 2019-05-29 | Stop reason: HOSPADM

## 2019-05-29 RX ORDER — LIDOCAINE HYDROCHLORIDE 10 MG/ML
1 INJECTION, SOLUTION EPIDURAL; INFILTRATION; INTRACAUDAL; PERINEURAL
Status: COMPLETED | OUTPATIENT
Start: 2019-05-29 | End: 2019-05-29

## 2019-05-29 RX ORDER — SODIUM CHLORIDE 0.9 % (FLUSH) 0.9 %
10 SYRINGE (ML) INJECTION PRN
Status: DISCONTINUED | OUTPATIENT
Start: 2019-05-29 | End: 2019-05-29 | Stop reason: HOSPADM

## 2019-05-29 RX ORDER — SODIUM CHLORIDE 0.9 % (FLUSH) 0.9 %
10 SYRINGE (ML) INJECTION EVERY 12 HOURS SCHEDULED
Status: DISCONTINUED | OUTPATIENT
Start: 2019-05-29 | End: 2019-05-29 | Stop reason: HOSPADM

## 2019-05-29 RX ORDER — PROPOFOL 10 MG/ML
INJECTION, EMULSION INTRAVENOUS CONTINUOUS PRN
Status: DISCONTINUED | OUTPATIENT
Start: 2019-05-29 | End: 2019-05-29 | Stop reason: SDUPTHER

## 2019-05-29 RX ORDER — DIPHENHYDRAMINE HYDROCHLORIDE 50 MG/ML
12.5 INJECTION INTRAMUSCULAR; INTRAVENOUS
Status: DISCONTINUED | OUTPATIENT
Start: 2019-05-29 | End: 2019-05-29 | Stop reason: HOSPADM

## 2019-05-29 RX ORDER — MAGNESIUM HYDROXIDE 1200 MG/15ML
LIQUID ORAL CONTINUOUS PRN
Status: COMPLETED | OUTPATIENT
Start: 2019-05-29 | End: 2019-05-29

## 2019-05-29 RX ORDER — LIDOCAINE HYDROCHLORIDE 20 MG/ML
INJECTION, SOLUTION EPIDURAL; INFILTRATION; INTRACAUDAL; PERINEURAL PRN
Status: DISCONTINUED | OUTPATIENT
Start: 2019-05-29 | End: 2019-05-29 | Stop reason: SDUPTHER

## 2019-05-29 RX ORDER — HYDROCODONE BITARTRATE AND ACETAMINOPHEN 5; 325 MG/1; MG/1
2 TABLET ORAL PRN
Status: DISCONTINUED | OUTPATIENT
Start: 2019-05-29 | End: 2019-05-29 | Stop reason: HOSPADM

## 2019-05-29 RX ORDER — METOCLOPRAMIDE HYDROCHLORIDE 5 MG/ML
10 INJECTION INTRAMUSCULAR; INTRAVENOUS
Status: DISCONTINUED | OUTPATIENT
Start: 2019-05-29 | End: 2019-05-29 | Stop reason: HOSPADM

## 2019-05-29 RX ORDER — FENTANYL CITRATE 50 UG/ML
50 INJECTION, SOLUTION INTRAMUSCULAR; INTRAVENOUS EVERY 10 MIN PRN
Status: DISCONTINUED | OUTPATIENT
Start: 2019-05-29 | End: 2019-05-29 | Stop reason: HOSPADM

## 2019-05-29 RX ORDER — SODIUM CHLORIDE, SODIUM LACTATE, POTASSIUM CHLORIDE, CALCIUM CHLORIDE 600; 310; 30; 20 MG/100ML; MG/100ML; MG/100ML; MG/100ML
INJECTION, SOLUTION INTRAVENOUS CONTINUOUS
Status: DISCONTINUED | OUTPATIENT
Start: 2019-05-29 | End: 2019-05-29 | Stop reason: HOSPADM

## 2019-05-29 RX ORDER — ONDANSETRON 2 MG/ML
4 INJECTION INTRAMUSCULAR; INTRAVENOUS
Status: DISCONTINUED | OUTPATIENT
Start: 2019-05-29 | End: 2019-05-29 | Stop reason: HOSPADM

## 2019-05-29 RX ORDER — HYDROCODONE BITARTRATE AND ACETAMINOPHEN 5; 325 MG/1; MG/1
1 TABLET ORAL PRN
Status: DISCONTINUED | OUTPATIENT
Start: 2019-05-29 | End: 2019-05-29 | Stop reason: HOSPADM

## 2019-05-29 RX ORDER — LIDOCAINE HYDROCHLORIDE 10 MG/ML
1 INJECTION, SOLUTION EPIDURAL; INFILTRATION; INTRACAUDAL; PERINEURAL
Status: DISCONTINUED | OUTPATIENT
Start: 2019-05-29 | End: 2019-05-29 | Stop reason: HOSPADM

## 2019-05-29 RX ADMIN — LIDOCAINE HYDROCHLORIDE 60 MG: 20 INJECTION, SOLUTION EPIDURAL; INFILTRATION; INTRACAUDAL; PERINEURAL at 11:51

## 2019-05-29 RX ADMIN — SODIUM CHLORIDE, POTASSIUM CHLORIDE, SODIUM LACTATE AND CALCIUM CHLORIDE: 600; 310; 30; 20 INJECTION, SOLUTION INTRAVENOUS at 10:03

## 2019-05-29 RX ADMIN — VANCOMYCIN HYDROCHLORIDE 1 G: 1 INJECTION, POWDER, LYOPHILIZED, FOR SOLUTION INTRAVENOUS at 11:51

## 2019-05-29 RX ADMIN — LIDOCAINE HYDROCHLORIDE 0.1 ML: 10 INJECTION, SOLUTION EPIDURAL; INFILTRATION; INTRACAUDAL; PERINEURAL at 10:02

## 2019-05-29 RX ADMIN — PROPOFOL 100 MCG/KG/MIN: 10 INJECTION, EMULSION INTRAVENOUS at 11:51

## 2019-05-29 RX ADMIN — SODIUM CHLORIDE, POTASSIUM CHLORIDE, SODIUM LACTATE AND CALCIUM CHLORIDE: 600; 310; 30; 20 INJECTION, SOLUTION INTRAVENOUS at 12:29

## 2019-05-29 ASSESSMENT — PULMONARY FUNCTION TESTS
PIF_VALUE: 1
PIF_VALUE: 0
PIF_VALUE: 0
PIF_VALUE: 1
PIF_VALUE: 0
PIF_VALUE: 1
PIF_VALUE: 1
PIF_VALUE: 0
PIF_VALUE: 0
PIF_VALUE: 1
PIF_VALUE: 0
PIF_VALUE: 1
PIF_VALUE: 0
PIF_VALUE: 1

## 2019-05-29 ASSESSMENT — COPD QUESTIONNAIRES: CAT_SEVERITY: NO INTERVAL CHANGE

## 2019-05-29 ASSESSMENT — PAIN - FUNCTIONAL ASSESSMENT: PAIN_FUNCTIONAL_ASSESSMENT: 0-10

## 2019-05-29 ASSESSMENT — PAIN SCALES - GENERAL: PAINLEVEL_OUTOF10: 0

## 2019-05-29 ASSESSMENT — PAIN DESCRIPTION - DESCRIPTORS: DESCRIPTORS: ACHING

## 2019-05-29 NOTE — PROGRESS NOTES
Discharge instructions provided to patient and  - no further questions or concerns at this time. Pt tolerating PO well.

## 2019-05-29 NOTE — BRIEF OP NOTE
Brief Postoperative Note  ______________________________________________________________    Patient: John Liu  YOB: 1955  MRN: [id number]  Date of Procedure: 5/29/2019    Pre-Op Diagnosis: POST LAMINECTOMY SYNDROME    Post-Op Diagnosis: Same       Procedure(s):  REMOVAL OF INTRATHECAL PUMP RESERVOIR    Anesthesia: Monitor Anesthesia Care    Surgeon(s):  Cammy Enriquez MD    Assistant: Bimal Henriquez    Estimated Blood Loss (mL): less than 50     Complications: None    Specimens:   * No specimens in log *    Implants:  * No implants in log *      Drains: * No LDAs found *    Findings: see op note    Cammy Enriquez MD  Date: 5/29/2019  Time: 12:13 PM

## 2019-05-29 NOTE — ANESTHESIA PRE PROCEDURE
Department of Anesthesiology  Preprocedure Note       Name:  Nimo Del Castillo   Age:  60 y.o.  :  1955                                          MRN:  [id number]         Date:  2019      Surgeon: Chris Everett):  Cecille Aaron MD    Procedure: REMOVAL OF INTRATHECAL PUMP RESERVOIR (N/A )    Medications prior to admission:   Prior to Admission medications    Medication Sig Start Date End Date Taking? Authorizing Provider   tiZANidine (ZANAFLEX) 4 MG tablet Take 4 mg by mouth nightly 19  Yes Historical Provider, MD   DULoxetine (CYMBALTA) 30 MG extended release capsule Take 3 capsules by mouth daily 19  Yes Feliz Humphrey MD   insulin lispro (HUMALOG) 100 UNIT/ML pen 3 units for every 15 grams of CHOs; 65 - 70 units daily 19  Yes Feliz Humphrey MD   insulin glargine (LANTUS SOLOSTAR) 100 UNIT/ML injection pen Inject 30 Units into the skin 2 times daily 19  Yes Feliz Humphrey MD   omeprazole (PRILOSEC) 40 MG delayed release capsule Take 1 capsule by mouth daily 19  Yes Feliz Humphrey MD   simvastatin (ZOCOR) 40 MG tablet Take 1 tablet by mouth daily  Patient taking differently: Take 40 mg by mouth nightly  19  Yes Zack Small MD   lisinopril (PRINIVIL;ZESTRIL) 10 MG tablet Take 1 tablet by mouth daily  Patient taking differently: Take 10 mg by mouth every evening  19  Yes Zack Small MD   OLANZapine (ZYPREXA) 20 MG tablet Take 1 tablet by mouth nightly 10/17/18  Yes Feliz Humphrey MD   metFORMIN (GLUCOPHAGE-XR) 500 MG extended release tablet 1 tablet 2 times daily 18  Yes Historical Provider, MD   acetaminophen-codeine (TYLENOL/CODEINE #4) 300-60 MG per tablet Take 1 tablet by mouth 3 times daily as needed. . 16  Yes Historical Provider, MD   docusate sodium (COLACE) 100 MG capsule Take 1 capsule by mouth 2 times daily as needed for Constipation 18  Yes Feliz Humphrey MD   Exenatide 2 MG PEN Inject 1 pen into the skin once a week 19  Shanel Martinez [place] MD   meloxicam [facility]) 15 MG tablet Take 1 tablet by mouth daily 4/17/19   Ron Ochoa MD   clopidogrel (PLAVIX) 75 MG tablet Take 1 tablet by mouth daily  Patient taking differently: Take 75 mg by mouth every evening  4/1/19   Angie Guerrero MD   nicotine polacrilex (NICORETTE) 2 MG gum Take 1 each by mouth as needed for Smoking cessation 12/6/18   Ron Ochoa MD   furosemide (LASIX) 20 MG tablet Take 1 tablet by mouth daily as needed (leg swelling) 6/27/18   Ron Ochoa MD   nystatin-triamcinolone [facility]) 814527-4.1 UNIT/GM-% cream Apply topically 2 times daily.  6/27/18   Ron Ochoa MD   UNABLE TO FIND Fentanyl 1,000 mg/day and Bupivicaine 30 mg/day via implanted pump 1/6/15   Historical Provider, MD       Current medications:    Current Facility-Administered Medications   Medication Dose Route Frequency Provider Last Rate Last Dose    vancomycin 1000 mg IVPB in 250 mL D5W addavial  1,000 mg Intravenous On Call to MD Hakeem        lactated ringers infusion   Intravenous Continuous Bunny Martin, APRN - CNP        lidocaine PF 1 % injection 1 mL  1 mL Intradermal Once PRN Bunny Martin, APRN - CNP        sodium chloride flush 0.9 % injection 10 mL  10 mL Intravenous 2 times per day Bunny [place], APRN - CNP        sodium chloride flush 0.9 % injection 10 mL  10 mL Intravenous PRN Bunny [place], APRN - CNP        fentaNYL (SUBLIMAZE) injection 50 mcg  50 mcg Intravenous Q10 Min PRN Cain Fournier MD        HYDROmorphone (DILAUDID) injection 0.5 mg  0.5 mg Intravenous Q10 Min PRN Cain Fournier MD        HYDROcodone-acetaminophen [facility]) 5-325 MG per tablet 1 tablet  1 tablet Oral PRN Cain Fournier MD        Or    HYDROcodone-acetaminophen [facility]) 5-325 MG per tablet 2 tablet  2 tablet Oral PRN Cain Fournier MD        diphenhydrAMINE (BENADRYL) injection 12.5 mg  12.5 mg Intravenous Once PRN Cain Fournier syndrome     Type 2 diabetes mellitus without complication (HCC)     hx > 7 yrs       Past Surgical History:        Procedure Laterality Date    ABDOMINAL EXPLORATION SURGERY  1970s    due to abdominal pain / no definite dx    BACK SURGERY  12/24/2018    lumbar disc OR [address] Right 1989    due to tendonitis    ENDOMETRIAL ABLATION  1990s    AUB    OTHER SURGICAL HISTORY      pain pump placement 2010 & replacement 2016    TONSILLECTOMY      age 24       Social History:    Social History     Tobacco Use    Smoking status: Current Every Day Smoker     Packs/day: 1.00     Years: 44.00     Pack years: 44.00     Types: Cigarettes     Start date: 1975    Smokeless tobacco: Never Used   Substance Use Topics    Alcohol use: No                                Ready to quit: Not Answered  Counseling given: Not Answered      Vital Signs (Current): There were no vitals filed for this visit.                                            BP Readings from Last 3 Encounters:   05/24/19 (!) 112/59   04/17/19 122/70   01/23/19 126/82       NPO Status:                                                                                 BMI:   Wt Readings from Last 3 Encounters:   05/24/19 194 lb (88 kg)   04/17/19 198 lb 9.6 oz (90.1 kg)   01/23/19 204 lb 9.6 oz (92.8 kg)     There is no height or weight on file to calculate BMI.    CBC:   Lab Results   Component Value Date    WBC 13.8 05/24/2019    RBC 5.08 05/24/2019    HGB 15.2 05/24/2019    HCT 44.9 05/24/2019    MCV 88.4 05/24/2019    RDW 17.3 05/24/2019     05/24/2019       CMP:   Lab Results   Component Value Date     05/24/2019    K 4.0 05/24/2019     05/24/2019    CO2 22 05/24/2019    BUN 8 05/24/2019    CREATININE 0.57 05/24/2019    GFRAA >60.0 05/24/2019    LABGLOM >60.0 05/24/2019    GLUCOSE 140 05/24/2019    CALCIUM 8.9 05/24/2019    BILITOT 0.3 12/03/2018    ALKPHOS 97 12/03/2018    AST 10 12/03/2018    ALT 8 12/03/2018       POC Tests: No results for input(s): POCGLU, POCNA, POCK, POCCL, POCBUN, POCHEMO, POCHCT in the last 72 hours. Coags:   Lab Results   Component Value Date    PROTIME 12.8 12/03/2018    INR 1.13 12/03/2018       HCG (If Applicable): No results found for: PREGTESTUR, PREGSERUM, HCG, HCGQUANT     ABGs: No results found for: PHART, PO2ART, ETH2FWD, CCW6ANC, BEART, U6UEWUGC     Type & Screen (If Applicable):  No results found for: [facility]    Anesthesia Evaluation  Patient summary reviewed and Nursing notes reviewed no history of anesthetic complications:   Airway: Mallampati: II  TM distance: >3 FB   Neck ROM: full  Mouth opening: > = 3 FB Dental: normal exam         Pulmonary:normal exam    (+) COPD: no interval change,                             Cardiovascular:    (+) hypertension: no interval change,       ECG reviewed               Beta Blocker:  Dose within 24 Hrs         Neuro/Psych:   (+) CVA: no interval change, TIA, psychiatric history: stable with treatment            GI/Hepatic/Renal:             Endo/Other:    (+) Diabetes, . Pt had PAT visit. Abdominal:           Vascular: negative vascular ROS. Anesthesia Plan      MAC     ASA 3           MIPS: Prophylactic antiemetics administered. Anesthetic plan and risks discussed with patient. Plan discussed with CRNA.     Attending anesthesiologist reviewed and agrees with Pre Eval content              Humza Ferrara MD   5/29/2019

## 2019-05-30 NOTE — OP NOTE
[address]                      [address] [place], [address] [facility]                                OPERATIVE REPORT    PATIENT NAME: Dinah Funes                  :        1955  MED REC NO:   [id number]                            ROOM:  ACCOUNT NO:   [de-identified]                           ADMIT DATE: 2019  PROVIDER:     Lorna Vanegas MD    DATE OF PROCEDURE:  2019    PREOPERATIVE DIAGNOSIS:  Degenerative disk, lumbar spine, intractable  low back pain status post fusion of lumbar spine. POSTOPERATIVE DIAGNOSIS:  Degenerative disk, lumbar spine, intractable  low back pain status post fusion of lumbar spine. OPERATION PERFORMED:  Removal intrathecal pump. ANESTHESIA:  MAC. SURGEON:  Lorna Vanegas MD.    BLOOD LOSS:  None. COMPLICATIONS:  None. OPERATIVE PROCEDURE:  The patient was brought to the operating room. She was in supine position. Anesthesia was started. Abdomen was  sterilely cleaned and draped with aseptic technique. Combination of 1%  lidocaine with 0.5% Marcaine was injected over previous scar tissue. Incision was made. All the bleeding points were cauterized. Dissection  was done to find the capture surrounding the pump was incised. Pump was  removed from fascia by cutting the Prolene sutures. Then, catheter was  freed, was pulled as much as it came out. We removed it. Then, wounds  were closed with 3-0 Vicryl and 4-0 Vicryl before doing that we tried to  obliterate the cavity by putting some sutures between the subcutaneous  tissue and the fascia. Dressings were applied. The patient was  awakened, was taken to the recovery room. The patient will follow up  with me in four to six weeks' time.         Kirsten Villela MD    D: 2019 12:29:23       T: 2019 22:52:28     [email or web address]  Job#: [id number]     Doc#: [id number]    CC:

## 2019-06-11 ENCOUNTER — HOSPITAL ENCOUNTER (OUTPATIENT)
Dept: WOMENS IMAGING | Age: 64
Discharge: HOME | End: 2019-06-13
Payer: COMMERCIAL

## 2019-06-11 ENCOUNTER — HOSPITAL ENCOUNTER (OUTPATIENT)
Dept: CT IMAGING | Age: 64
Discharge: HOME | End: 2019-06-13
Payer: COMMERCIAL

## 2019-06-11 DIAGNOSIS — Z12.39 BREAST SCREENING: ICD-10-CM

## 2019-06-11 DIAGNOSIS — Z87.891 PERSONAL HISTORY OF TOBACCO USE: ICD-10-CM

## 2019-06-11 PROCEDURE — 77067 SCR MAMMO BI INCL CAD: CPT

## 2019-06-11 PROCEDURE — G0297 LDCT FOR LUNG CA SCREEN: HCPCS

## 2019-06-12 DIAGNOSIS — F17.200 SMOKER: Primary | ICD-10-CM

## 2019-07-11 ENCOUNTER — HOSPITAL ENCOUNTER (OUTPATIENT)
Dept: GENERAL RADIOLOGY | Age: 64
Discharge: HOME | End: 2019-07-13
Payer: COMMERCIAL

## 2019-07-11 DIAGNOSIS — M54.5 LOW BACK PAIN, UNSPECIFIED BACK PAIN LATERALITY, UNSPECIFIED CHRONICITY, WITH SCIATICA PRESENCE UNSPECIFIED: ICD-10-CM

## 2019-07-11 PROCEDURE — 72100 X-RAY EXAM L-S SPINE 2/3 VWS: CPT

## 2019-07-24 ENCOUNTER — OFFICE VISIT (OUTPATIENT)
Dept: CARDIOLOGY CLINIC | Age: 64
End: 2019-07-24
Payer: COMMERCIAL

## 2019-07-24 VITALS
HEART RATE: 88 BPM | SYSTOLIC BLOOD PRESSURE: 112 MMHG | WEIGHT: 194 LBS | HEIGHT: 63 IN | BODY MASS INDEX: 34.38 KG/M2 | RESPIRATION RATE: 12 BRPM | DIASTOLIC BLOOD PRESSURE: 80 MMHG

## 2019-07-24 DIAGNOSIS — F17.200 SMOKER: ICD-10-CM

## 2019-07-24 DIAGNOSIS — I10 ESSENTIAL HYPERTENSION: ICD-10-CM

## 2019-07-24 DIAGNOSIS — G90.A POTS (POSTURAL ORTHOSTATIC TACHYCARDIA SYNDROME): Primary | ICD-10-CM

## 2019-07-24 PROCEDURE — G8427 DOCREV CUR MEDS BY ELIG CLIN: HCPCS | Performed by: INTERNAL MEDICINE

## 2019-07-24 PROCEDURE — 3017F COLORECTAL CA SCREEN DOC REV: CPT | Performed by: INTERNAL MEDICINE

## 2019-07-24 PROCEDURE — 99213 OFFICE O/P EST LOW 20 MIN: CPT | Performed by: INTERNAL MEDICINE

## 2019-07-24 PROCEDURE — G8417 CALC BMI ABV UP PARAM F/U: HCPCS | Performed by: INTERNAL MEDICINE

## 2019-07-24 PROCEDURE — 4004F PT TOBACCO SCREEN RCVD TLK: CPT | Performed by: INTERNAL MEDICINE

## 2019-07-24 RX ORDER — SIMVASTATIN 40 MG
40 TABLET ORAL NIGHTLY
COMMUNITY
End: 2020-04-17 | Stop reason: SDUPTHER

## 2019-07-24 RX ORDER — LISINOPRIL 10 MG/1
10 TABLET ORAL NIGHTLY
COMMUNITY
End: 2020-04-17 | Stop reason: SDUPTHER

## 2019-07-24 RX ORDER — CLOPIDOGREL BISULFATE 75 MG/1
75 TABLET ORAL NIGHTLY
COMMUNITY
End: 2020-04-17 | Stop reason: SDUPTHER

## 2019-07-24 ASSESSMENT — ENCOUNTER SYMPTOMS
CHEST TIGHTNESS: 0
COUGH: 0
ANAL BLEEDING: 0
APNEA: 0
VOMITING: 0
SHORTNESS OF BREATH: 0
DIARRHEA: 0
ABDOMINAL DISTENTION: 0
COLOR CHANGE: 0
ABDOMINAL PAIN: 0
BLOOD IN STOOL: 0
NAUSEA: 0

## 2019-07-24 NOTE — PROGRESS NOTES
Marital status:      Spouse name: None    Number of children: None    Years of education: None    Highest education level: None   Occupational History    None   Social Needs    Financial resource strain: None    Food insecurity:     Worry: None     Inability: None    Transportation needs:     Medical: None     Non-medical: None   Tobacco Use    Smoking status: Current Every Day Smoker     Packs/day: 1.00     Years: 44.00     Pack years: 44.00     Types: Cigarettes     Start date: 1975    Smokeless tobacco: Never Used   Substance and Sexual Activity    Alcohol use: No    Drug use: No    Sexual activity: None   Lifestyle    Physical activity:     Days per week: None     Minutes per session: None    Stress: None   Relationships    Social connections:     Talks on phone: None     Gets together: None     Attends Caodaism service: None     Active member of club or organization: None     Attends meetings of clubs or organizations: None     Relationship status: None    Intimate partner violence:     Fear of current or ex partner: None     Emotionally abused: None     Physically abused: None     Forced sexual activity: None   Other Topics Concern    None   Social History Narrative    None       Allergies   Allergen Reactions    Aspirin Swelling    Penicillins Rash       Current Outpatient Medications   Medication Sig Dispense Refill    simvastatin (ZOCOR) 40 MG tablet Take 40 mg by mouth nightly      clopidogrel (PLAVIX) 75 MG tablet Take 75 mg by mouth nightly      lisinopril (PRINIVIL;ZESTRIL) 10 MG tablet Take 10 mg by mouth nightly      tiZANidine (ZANAFLEX) 4 MG tablet Take 4 mg by mouth nightly  0    DULoxetine (CYMBALTA) 30 MG extended release capsule Take 3 capsules by mouth daily 270 capsule 3    insulin lispro (HUMALOG) 100 UNIT/ML pen 3 units for every 15 grams of CHOs; 65 - 70 units daily 5 pen 0    insulin glargine (LANTUS SOLOSTAR) 100 UNIT/ML injection pen Inject 30 Units into the skin 2 times daily 5 pen 0    Exenatide 2 MG PEN Inject 1 pen into the skin once a week 4 pen 0    meloxicam (MOBIC) 15 MG tablet Take 1 tablet by mouth daily 90 tablet 3    omeprazole (PRILOSEC) 40 MG delayed release capsule Take 1 capsule by mouth daily 90 capsule 3    OLANZapine (ZYPREXA) 20 MG tablet Take 1 tablet by mouth nightly 30 tablet 5    furosemide (LASIX) 20 MG tablet Take 1 tablet by mouth daily as needed (leg swelling) 30 tablet 0    nystatin-triamcinolone (MYCOLOG II) 751506-1.1 UNIT/GM-% cream Apply topically 2 times daily. 60 g 1    metFORMIN (GLUCOPHAGE-XR) 500 MG extended release tablet 1 tablet 2 times daily      acetaminophen-codeine (TYLENOL/CODEINE #4) 300-60 MG per tablet Take 1 tablet by mouth 3 times daily as needed. Kehinde Currie UNABLE TO FIND Fentanyl 1,000 mg/day and Bupivicaine 30 mg/day via implanted pump      docusate sodium (COLACE) 100 MG capsule Take 1 capsule by mouth 2 times daily as needed for Constipation 60 capsule 2     No current facility-administered medications for this visit. Review of Systems:   Review of Systems   Constitutional: Negative for activity change, appetite change, diaphoresis and fatigue. HENT: Negative for nosebleeds. Respiratory: Negative for apnea, cough, chest tightness and shortness of breath. Cardiovascular: Negative for chest pain, palpitations and leg swelling. Gastrointestinal: Negative for abdominal distention, abdominal pain, anal bleeding, blood in stool, diarrhea, nausea and vomiting. Musculoskeletal: Negative for gait problem and myalgias. Skin: Negative for color change, pallor, rash and wound. Neurological: Negative for dizziness, seizures, syncope, speech difficulty, weakness, light-headedness, numbness and headaches. Hematological: Does not bruise/bleed easily. Psychiatric/Behavioral: Negative for agitation. The patient is not nervous/anxious.     All other systems reviewed and are

## 2019-07-26 RX ORDER — OLANZAPINE 20 MG/1
20 TABLET ORAL NIGHTLY
Qty: 90 TABLET | Refills: 5 | Status: SHIPPED | OUTPATIENT
Start: 2019-07-26 | End: 2020-09-16 | Stop reason: SDUPTHER

## 2019-08-12 NOTE — TELEPHONE ENCOUNTER
Rx requested:  Requested Prescriptions     Pending Prescriptions Disp Refills    insulin lispro (HUMALOG) 100 UNIT/ML pen 5 pen 0     Sig: 3 units for every 15 grams of CHOs; 65 - 70 units daily       Last Office Visit:   4/17/2019    Last Labs:  5/24/19    Last filled:  4/26/19    Next Visit Date:  Future Appointments   Date Time Provider Tran Horne   10/8/2019 10:30 AM Eulalia Valle MD [address] [facility]   1/28/2020 10:30 AM Alex Stanley MD [address] [facility]

## 2019-09-20 DIAGNOSIS — E11.9 DIABETES MELLITUS TYPE 2 WITHOUT RETINOPATHY (HCC): Primary | ICD-10-CM

## 2019-09-20 NOTE — TELEPHONE ENCOUNTER
Pt last seen 4/17/2019. Pt requesting refill of insulin pens. Humalog and Lantus Solostar to be filled by [address] Please advise.

## 2019-10-02 ENCOUNTER — TELEPHONE (OUTPATIENT)
Dept: FAMILY MEDICINE CLINIC | Age: 64
End: 2019-10-02

## 2019-10-02 DIAGNOSIS — E11.9 DIABETES MELLITUS TYPE 2 WITHOUT RETINOPATHY (HCC): Primary | ICD-10-CM

## 2019-10-08 ENCOUNTER — OFFICE VISIT (OUTPATIENT)
Dept: FAMILY MEDICINE CLINIC | Age: 64
End: 2019-10-08
Payer: COMMERCIAL

## 2019-10-08 VITALS
OXYGEN SATURATION: 93 % | DIASTOLIC BLOOD PRESSURE: 64 MMHG | WEIGHT: 209.4 LBS | HEIGHT: 63 IN | TEMPERATURE: 98 F | BODY MASS INDEX: 37.1 KG/M2 | HEART RATE: 73 BPM | SYSTOLIC BLOOD PRESSURE: 112 MMHG

## 2019-10-08 DIAGNOSIS — J43.9 PULMONARY EMPHYSEMA, UNSPECIFIED EMPHYSEMA TYPE (HCC): ICD-10-CM

## 2019-10-08 DIAGNOSIS — Z23 NEED FOR IMMUNIZATION AGAINST INFLUENZA: ICD-10-CM

## 2019-10-08 DIAGNOSIS — E11.9 DIABETES MELLITUS TYPE 2 WITHOUT RETINOPATHY (HCC): Primary | ICD-10-CM

## 2019-10-08 DIAGNOSIS — M54.42 CHRONIC MIDLINE LOW BACK PAIN WITH LEFT-SIDED SCIATICA: ICD-10-CM

## 2019-10-08 DIAGNOSIS — G89.29 CHRONIC MIDLINE LOW BACK PAIN WITH LEFT-SIDED SCIATICA: ICD-10-CM

## 2019-10-08 LAB — HBA1C MFR BLD: 5 %

## 2019-10-08 PROCEDURE — G8926 SPIRO NO PERF OR DOC: HCPCS | Performed by: FAMILY MEDICINE

## 2019-10-08 PROCEDURE — 4004F PT TOBACCO SCREEN RCVD TLK: CPT | Performed by: FAMILY MEDICINE

## 2019-10-08 PROCEDURE — 3017F COLORECTAL CA SCREEN DOC REV: CPT | Performed by: FAMILY MEDICINE

## 2019-10-08 PROCEDURE — 3023F SPIROM DOC REV: CPT | Performed by: FAMILY MEDICINE

## 2019-10-08 PROCEDURE — 2022F DILAT RTA XM EVC RTNOPTHY: CPT | Performed by: FAMILY MEDICINE

## 2019-10-08 PROCEDURE — G8427 DOCREV CUR MEDS BY ELIG CLIN: HCPCS | Performed by: FAMILY MEDICINE

## 2019-10-08 PROCEDURE — G8482 FLU IMMUNIZE ORDER/ADMIN: HCPCS | Performed by: FAMILY MEDICINE

## 2019-10-08 PROCEDURE — 90688 IIV4 VACCINE SPLT 0.5 ML IM: CPT | Performed by: FAMILY MEDICINE

## 2019-10-08 PROCEDURE — 83036 HEMOGLOBIN GLYCOSYLATED A1C: CPT | Performed by: FAMILY MEDICINE

## 2019-10-08 PROCEDURE — 3044F HG A1C LEVEL LT 7.0%: CPT | Performed by: FAMILY MEDICINE

## 2019-10-08 PROCEDURE — 90471 IMMUNIZATION ADMIN: CPT | Performed by: FAMILY MEDICINE

## 2019-10-08 PROCEDURE — 99214 OFFICE O/P EST MOD 30 MIN: CPT | Performed by: FAMILY MEDICINE

## 2019-10-08 PROCEDURE — G8417 CALC BMI ABV UP PARAM F/U: HCPCS | Performed by: FAMILY MEDICINE

## 2019-10-08 RX ORDER — MELOXICAM 15 MG/1
15 TABLET ORAL DAILY
Qty: 90 TABLET | Refills: 3 | Status: SHIPPED | OUTPATIENT
Start: 2019-10-08 | End: 2020-09-16 | Stop reason: SDUPTHER

## 2019-10-08 ASSESSMENT — ENCOUNTER SYMPTOMS
COUGH: 0
DIARRHEA: 0
WHEEZING: 0
CONSTIPATION: 0
RHINORRHEA: 0
SORE THROAT: 0
SHORTNESS OF BREATH: 0
ABDOMINAL PAIN: 0

## 2019-11-21 ENCOUNTER — HOSPITAL ENCOUNTER (OUTPATIENT)
Dept: PHYSICAL THERAPY | Age: 64
Setting detail: THERAPIES SERIES
Discharge: HOME | End: 2019-11-21
Payer: COMMERCIAL

## 2019-11-21 PROCEDURE — 97110 THERAPEUTIC EXERCISES: CPT

## 2019-11-21 PROCEDURE — 97162 PT EVAL MOD COMPLEX 30 MIN: CPT

## 2019-11-21 ASSESSMENT — PAIN DESCRIPTION - PAIN TYPE: TYPE: CHRONIC PAIN

## 2019-11-21 ASSESSMENT — PAIN DESCRIPTION - DESCRIPTORS: DESCRIPTORS: ACHING

## 2019-11-21 ASSESSMENT — PAIN SCALES - GENERAL: PAINLEVEL_OUTOF10: 3

## 2019-11-21 ASSESSMENT — PAIN DESCRIPTION - LOCATION: LOCATION: BACK

## 2019-11-21 ASSESSMENT — PAIN DESCRIPTION - ORIENTATION: ORIENTATION: LEFT

## 2019-11-25 ENCOUNTER — HOSPITAL ENCOUNTER (OUTPATIENT)
Dept: PHYSICAL THERAPY | Age: 64
Setting detail: THERAPIES SERIES
Discharge: HOME | End: 2019-11-25
Payer: COMMERCIAL

## 2019-11-25 PROCEDURE — 97113 AQUATIC THERAPY/EXERCISES: CPT

## 2019-11-25 ASSESSMENT — PAIN SCALES - GENERAL: PAINLEVEL_OUTOF10: 4

## 2019-11-25 ASSESSMENT — PAIN DESCRIPTION - ORIENTATION: ORIENTATION: LOWER

## 2019-11-25 ASSESSMENT — PAIN DESCRIPTION - LOCATION: LOCATION: BACK

## 2019-11-25 ASSESSMENT — PAIN DESCRIPTION - DESCRIPTORS: DESCRIPTORS: ACHING

## 2019-11-29 ENCOUNTER — HOSPITAL ENCOUNTER (OUTPATIENT)
Dept: PHYSICAL THERAPY | Age: 64
Setting detail: THERAPIES SERIES
Discharge: HOME | End: 2019-11-29
Payer: COMMERCIAL

## 2019-11-29 PROCEDURE — 97113 AQUATIC THERAPY/EXERCISES: CPT

## 2019-11-29 ASSESSMENT — PAIN DESCRIPTION - PAIN TYPE: TYPE: CHRONIC PAIN

## 2019-11-29 ASSESSMENT — PAIN SCALES - GENERAL: PAINLEVEL_OUTOF10: 4

## 2019-11-29 ASSESSMENT — PAIN DESCRIPTION - ORIENTATION: ORIENTATION: LOWER;RIGHT

## 2019-11-29 ASSESSMENT — PAIN DESCRIPTION - LOCATION: LOCATION: BACK

## 2019-11-29 ASSESSMENT — PAIN DESCRIPTION - DESCRIPTORS: DESCRIPTORS: ACHING

## 2019-12-02 ENCOUNTER — HOSPITAL ENCOUNTER (OUTPATIENT)
Dept: PHYSICAL THERAPY | Age: 64
Setting detail: THERAPIES SERIES
Discharge: HOME | End: 2019-12-02
Payer: COMMERCIAL

## 2019-12-02 PROCEDURE — 97113 AQUATIC THERAPY/EXERCISES: CPT

## 2019-12-02 ASSESSMENT — PAIN DESCRIPTION - DESCRIPTORS: DESCRIPTORS: ACHING;TIGHTNESS

## 2019-12-02 ASSESSMENT — PAIN DESCRIPTION - PAIN TYPE: TYPE: CHRONIC PAIN

## 2019-12-02 ASSESSMENT — PAIN SCALES - GENERAL: PAINLEVEL_OUTOF10: 3

## 2019-12-02 ASSESSMENT — PAIN DESCRIPTION - LOCATION: LOCATION: BACK

## 2019-12-02 ASSESSMENT — PAIN DESCRIPTION - ORIENTATION: ORIENTATION: LOWER;RIGHT;LEFT

## 2019-12-06 ENCOUNTER — HOSPITAL ENCOUNTER (OUTPATIENT)
Dept: PHYSICAL THERAPY | Age: 64
Setting detail: THERAPIES SERIES
Discharge: HOME | End: 2019-12-06
Payer: COMMERCIAL

## 2019-12-06 PROCEDURE — 97113 AQUATIC THERAPY/EXERCISES: CPT

## 2019-12-06 ASSESSMENT — PAIN DESCRIPTION - LOCATION: LOCATION: BACK

## 2019-12-06 ASSESSMENT — PAIN SCALES - GENERAL: PAINLEVEL_OUTOF10: 3

## 2019-12-06 ASSESSMENT — PAIN DESCRIPTION - FREQUENCY: FREQUENCY: CONTINUOUS

## 2019-12-06 ASSESSMENT — PAIN DESCRIPTION - ORIENTATION: ORIENTATION: LEFT;RIGHT

## 2019-12-09 ENCOUNTER — HOSPITAL ENCOUNTER (OUTPATIENT)
Dept: PHYSICAL THERAPY | Age: 64
Setting detail: THERAPIES SERIES
Discharge: HOME | End: 2019-12-09
Payer: COMMERCIAL

## 2019-12-09 PROCEDURE — 97113 AQUATIC THERAPY/EXERCISES: CPT

## 2019-12-09 ASSESSMENT — PAIN SCALES - GENERAL: PAINLEVEL_OUTOF10: 4

## 2019-12-09 ASSESSMENT — PAIN DESCRIPTION - ORIENTATION: ORIENTATION: RIGHT;LEFT

## 2019-12-09 ASSESSMENT — PAIN DESCRIPTION - DESCRIPTORS: DESCRIPTORS: ACHING;TIGHTNESS

## 2019-12-09 ASSESSMENT — PAIN DESCRIPTION - LOCATION: LOCATION: BACK

## 2019-12-09 ASSESSMENT — PAIN DESCRIPTION - PAIN TYPE: TYPE: CHRONIC PAIN

## 2019-12-13 ENCOUNTER — HOSPITAL ENCOUNTER (OUTPATIENT)
Dept: PHYSICAL THERAPY | Age: 64
Setting detail: THERAPIES SERIES
Discharge: HOME | End: 2019-12-13
Payer: COMMERCIAL

## 2019-12-13 PROCEDURE — 97113 AQUATIC THERAPY/EXERCISES: CPT

## 2019-12-13 ASSESSMENT — PAIN DESCRIPTION - PAIN TYPE: TYPE: CHRONIC PAIN

## 2019-12-13 ASSESSMENT — PAIN SCALES - GENERAL: PAINLEVEL_OUTOF10: 4

## 2019-12-13 ASSESSMENT — PAIN DESCRIPTION - DESCRIPTORS: DESCRIPTORS: ACHING;TIGHTNESS

## 2019-12-13 ASSESSMENT — PAIN DESCRIPTION - LOCATION: LOCATION: BACK

## 2019-12-13 ASSESSMENT — PAIN DESCRIPTION - ORIENTATION: ORIENTATION: RIGHT;LEFT

## 2019-12-16 ENCOUNTER — HOSPITAL ENCOUNTER (OUTPATIENT)
Dept: PHYSICAL THERAPY | Age: 64
Setting detail: THERAPIES SERIES
Discharge: HOME | End: 2019-12-16
Payer: COMMERCIAL

## 2019-12-16 PROCEDURE — 97113 AQUATIC THERAPY/EXERCISES: CPT

## 2019-12-16 ASSESSMENT — PAIN DESCRIPTION - DESCRIPTORS: DESCRIPTORS: ACHING

## 2019-12-16 ASSESSMENT — PAIN SCALES - GENERAL: PAINLEVEL_OUTOF10: 4

## 2019-12-16 ASSESSMENT — PAIN DESCRIPTION - PROGRESSION: CLINICAL_PROGRESSION: GRADUALLY IMPROVING

## 2019-12-16 ASSESSMENT — PAIN DESCRIPTION - PAIN TYPE: TYPE: CHRONIC PAIN

## 2019-12-16 ASSESSMENT — PAIN DESCRIPTION - LOCATION: LOCATION: BACK

## 2019-12-16 ASSESSMENT — PAIN DESCRIPTION - ORIENTATION: ORIENTATION: LOWER;RIGHT

## 2019-12-20 ENCOUNTER — HOSPITAL ENCOUNTER (OUTPATIENT)
Dept: PHYSICAL THERAPY | Age: 64
Setting detail: THERAPIES SERIES
Discharge: HOME | End: 2019-12-20
Payer: COMMERCIAL

## 2019-12-20 PROCEDURE — 97113 AQUATIC THERAPY/EXERCISES: CPT

## 2019-12-20 ASSESSMENT — PAIN DESCRIPTION - DESCRIPTORS: DESCRIPTORS: ACHING

## 2019-12-20 ASSESSMENT — PAIN DESCRIPTION - LOCATION: LOCATION: BACK

## 2019-12-20 ASSESSMENT — PAIN SCALES - GENERAL: PAINLEVEL_OUTOF10: 5

## 2019-12-20 ASSESSMENT — PAIN DESCRIPTION - PROGRESSION: CLINICAL_PROGRESSION: GRADUALLY IMPROVING

## 2019-12-20 ASSESSMENT — PAIN DESCRIPTION - ORIENTATION: ORIENTATION: LOWER;RIGHT

## 2019-12-23 ENCOUNTER — HOSPITAL ENCOUNTER (OUTPATIENT)
Dept: PHYSICAL THERAPY | Age: 64
Setting detail: THERAPIES SERIES
Discharge: HOME | End: 2019-12-23
Payer: COMMERCIAL

## 2019-12-23 PROCEDURE — 97113 AQUATIC THERAPY/EXERCISES: CPT

## 2019-12-23 ASSESSMENT — PAIN DESCRIPTION - PAIN TYPE: TYPE: CHRONIC PAIN

## 2019-12-23 ASSESSMENT — PAIN DESCRIPTION - LOCATION: LOCATION: BACK

## 2019-12-23 ASSESSMENT — PAIN SCALES - GENERAL: PAINLEVEL_OUTOF10: 2

## 2019-12-23 ASSESSMENT — PAIN DESCRIPTION - ORIENTATION: ORIENTATION: RIGHT;LOWER

## 2019-12-23 ASSESSMENT — PAIN DESCRIPTION - DESCRIPTORS: DESCRIPTORS: ACHING

## 2019-12-23 ASSESSMENT — PAIN DESCRIPTION - PROGRESSION: CLINICAL_PROGRESSION: GRADUALLY IMPROVING

## 2020-01-08 ENCOUNTER — OFFICE VISIT (OUTPATIENT)
Dept: FAMILY MEDICINE CLINIC | Age: 65
End: 2020-01-08
Payer: COMMERCIAL

## 2020-01-08 VITALS
DIASTOLIC BLOOD PRESSURE: 68 MMHG | SYSTOLIC BLOOD PRESSURE: 128 MMHG | OXYGEN SATURATION: 95 % | HEIGHT: 63 IN | HEART RATE: 100 BPM | TEMPERATURE: 98.2 F | WEIGHT: 209 LBS | BODY MASS INDEX: 37.03 KG/M2

## 2020-01-08 LAB — HBA1C MFR BLD: 5.7 %

## 2020-01-08 PROCEDURE — G8427 DOCREV CUR MEDS BY ELIG CLIN: HCPCS | Performed by: FAMILY MEDICINE

## 2020-01-08 PROCEDURE — 83036 HEMOGLOBIN GLYCOSYLATED A1C: CPT | Performed by: FAMILY MEDICINE

## 2020-01-08 PROCEDURE — G8417 CALC BMI ABV UP PARAM F/U: HCPCS | Performed by: FAMILY MEDICINE

## 2020-01-08 PROCEDURE — 4004F PT TOBACCO SCREEN RCVD TLK: CPT | Performed by: FAMILY MEDICINE

## 2020-01-08 PROCEDURE — G8482 FLU IMMUNIZE ORDER/ADMIN: HCPCS | Performed by: FAMILY MEDICINE

## 2020-01-08 PROCEDURE — 99214 OFFICE O/P EST MOD 30 MIN: CPT | Performed by: FAMILY MEDICINE

## 2020-01-08 PROCEDURE — 3044F HG A1C LEVEL LT 7.0%: CPT | Performed by: FAMILY MEDICINE

## 2020-01-08 PROCEDURE — 2022F DILAT RTA XM EVC RTNOPTHY: CPT | Performed by: FAMILY MEDICINE

## 2020-01-08 PROCEDURE — 3017F COLORECTAL CA SCREEN DOC REV: CPT | Performed by: FAMILY MEDICINE

## 2020-01-08 RX ORDER — METFORMIN HYDROCHLORIDE 500 MG/1
500 TABLET, EXTENDED RELEASE ORAL 2 TIMES DAILY
Qty: 180 TABLET | Refills: 3 | Status: SHIPPED | OUTPATIENT
Start: 2020-01-08 | End: 2020-09-16 | Stop reason: SDUPTHER

## 2020-01-08 ASSESSMENT — ENCOUNTER SYMPTOMS
COUGH: 0
RHINORRHEA: 0
SORE THROAT: 0
SHORTNESS OF BREATH: 0
DIARRHEA: 0
ABDOMINAL PAIN: 0
CONSTIPATION: 0
WHEEZING: 0

## 2020-01-08 NOTE — PROGRESS NOTES
Financial resource strain: Not on file    Food insecurity:     Worry: Not on file     Inability: Not on file    Transportation needs:     Medical: Not on file     Non-medical: Not on file   Tobacco Use    Smoking status: Current Every Day Smoker     Packs/day: 1.00     Years: 44.00     Pack years: 44.00     Types: Cigarettes     Start date: 67    Smokeless tobacco: Never Used   Substance and Sexual Activity    Alcohol use: No    Drug use: No    Sexual activity: Not on file   Lifestyle    Physical activity:     Days per week: Not on file     Minutes per session: Not on file    Stress: Not on file   Relationships    Social connections:     Talks on phone: Not on file     Gets together: Not on file     Attends Jainism service: Not on file     Active member of club or organization: Not on file     Attends meetings of clubs or organizations: Not on file     Relationship status: Not on file    Intimate partner violence:     Fear of current or ex partner: Not on file     Emotionally abused: Not on file     Physically abused: Not on file     Forced sexual activity: Not on file   Other Topics Concern    Not on file   Social History Narrative    Not on file     Allergies   Allergen Reactions    Aspirin Swelling    Penicillins Rash     Current Outpatient Medications   Medication Sig Dispense Refill    metFORMIN (GLUCOPHAGE-XR) 500 MG extended release tablet Take 1 tablet by mouth 2 times daily 180 tablet 3    Exenatide 2 MG PEN Inject 1 pen into the skin once a week 4 pen 0    meloxicam (MOBIC) 15 MG tablet Take 1 tablet by mouth daily 90 tablet 3    albuterol-ipratropium (COMBIVENT RESPIMAT)  MCG/ACT AERS inhaler Inhale 1 puff into the lungs every 4 hours as needed for Wheezing or Shortness of Breath 3 Inhaler 3    insulin glargine (LANTUS SOLOSTAR) 100 UNIT/ML injection pen Inject 30 Units into the skin 2 times daily (Patient taking differently: Inject 22 Units into the skin 2 times daily ) 5

## 2020-01-17 RX ORDER — INSULIN LISPRO 100 [IU]/ML
INJECTION, SOLUTION INTRAVENOUS; SUBCUTANEOUS
Status: CANCELLED | OUTPATIENT
Start: 2020-01-17

## 2020-01-28 ENCOUNTER — OFFICE VISIT (OUTPATIENT)
Dept: CARDIOLOGY CLINIC | Age: 65
End: 2020-01-28
Payer: COMMERCIAL

## 2020-01-28 VITALS
HEART RATE: 97 BPM | BODY MASS INDEX: 38.27 KG/M2 | SYSTOLIC BLOOD PRESSURE: 124 MMHG | RESPIRATION RATE: 22 BRPM | OXYGEN SATURATION: 98 % | HEIGHT: 63 IN | DIASTOLIC BLOOD PRESSURE: 84 MMHG | WEIGHT: 216 LBS

## 2020-01-28 PROCEDURE — G8427 DOCREV CUR MEDS BY ELIG CLIN: HCPCS | Performed by: INTERNAL MEDICINE

## 2020-01-28 PROCEDURE — G8482 FLU IMMUNIZE ORDER/ADMIN: HCPCS | Performed by: INTERNAL MEDICINE

## 2020-01-28 PROCEDURE — 3017F COLORECTAL CA SCREEN DOC REV: CPT | Performed by: INTERNAL MEDICINE

## 2020-01-28 PROCEDURE — 4004F PT TOBACCO SCREEN RCVD TLK: CPT | Performed by: INTERNAL MEDICINE

## 2020-01-28 PROCEDURE — G8417 CALC BMI ABV UP PARAM F/U: HCPCS | Performed by: INTERNAL MEDICINE

## 2020-01-28 PROCEDURE — 99214 OFFICE O/P EST MOD 30 MIN: CPT | Performed by: INTERNAL MEDICINE

## 2020-01-28 ASSESSMENT — ENCOUNTER SYMPTOMS
SHORTNESS OF BREATH: 0
DIARRHEA: 0
BLOOD IN STOOL: 0
APNEA: 0
CHEST TIGHTNESS: 0
NAUSEA: 0
VOMITING: 0

## 2020-01-28 NOTE — PROGRESS NOTES
[facility] CARDIOLOGY OFFICE FOLLOW-UP      Patient: Loreto Root  YOB: 1955  MRN: [id number]    Chief Complaint:  Chief Complaint   Patient presents with    6 Month Follow-Up    Other     POTS    Hypertension         Subjective/HPI:  1/28/2020: Patient presents today for follow-up of pots. She is diabetic hypertensive. And allergic to aspirin. He is on Mobic 1 tablet daily. Moderately obese has dyslipidemia. Was reportedly told she had a TIA. Also has vasovagal episodes. They have not happened in a long time. She will see me in 6 months     7/24/19: Patient presents today for follow-up of POTS. Retired mutation. No chest pain. Had a stress test 2 years ago that was negative. Also diabetic. Moderately obese. Unfortunately continues to smoke. She will see me in 6 months.     1/23/19: Patient presents today for follow-up of POTS. Retired beautician. Spinal surgery by Dr. Dylon Mendoza went fine. No significant post op issues. She was told she had a CVA for which she is on Plavix. And is back on Plavix. Moderately obese and has GERD that stable. See me in 6 months.     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 Needs a spinal surgery. Had a stress test by Tyrell Cadet which was reportedly normal. Reportedly had CVA for which she is on Plavix. Because of the surgery will hold the Plavix for a week before the procedure and resume it as soon as possible. Otherwise acceptable risk for surgery.     7/25/18: Patient presents today for follow-up of POTS. Retired beautician. He has seen Tyrell Cadet in the past. Reportedly had a stroke. She is on Plavix. Has not been able to get disability. Diabetic. And dyslipidemia. She also has GERD. These other issues are stable. See me in 6 months.     4/24/18: Patient presents today for Evaluation of pots. Moderately obese retired beautician. Last episode of syncope was 2 years ago.  She had what appears to be a normal.   Skin: Skin is warm and dry. No cyanosis. No jaundice. Nails show no clubbing. Patient Active Problem List   Diagnosis    Chronic low back pain    Combined forms of age-related cataract of both eyes    [facility] obstructive [facility] disease) [facility]    Dermatochalasis of both upper eyelids    Diabetes mellitus type 2 without retinopathy ([facility]    HTN (hypertension)    Neuropathy, lower extremity    Depression    Drug-induced constipation    POTS (postural orthostatic tachycardia syndrome)    Smoker    Post laminectomy syndrome    TIA (transient ischemic attack)           No orders of the defined types were placed in this encounter. No orders of the defined types were placed in this encounter. Assessment:    1. POTS (postural orthostatic tachycardia syndrome)    2. Essential hypertension    3. Smoker    4. Allergic to aspirin       Plan:   Stay on same medications. See me in 6 months. This note was partially generated using Dragon voice recognition system, and there may be some incorrect words, spellings, punctuation that were not noticed in checking the note before saving.         Electronically signed by Maribeth Machuca MD on 1/30/2020 at 10:19 PM

## 2020-01-30 ASSESSMENT — ENCOUNTER SYMPTOMS
WHEEZING: 0
ABDOMINAL DISTENTION: 0
FACIAL SWELLING: 0
VOICE CHANGE: 0
ANAL BLEEDING: 0
COLOR CHANGE: 0
TROUBLE SWALLOWING: 0

## 2020-04-17 RX ORDER — LISINOPRIL 10 MG/1
10 TABLET ORAL NIGHTLY
Qty: 90 TABLET | Refills: 1 | Status: SHIPPED | OUTPATIENT
Start: 2020-04-17 | End: 2020-09-29 | Stop reason: SDUPTHER

## 2020-04-17 RX ORDER — SIMVASTATIN 40 MG
40 TABLET ORAL NIGHTLY
Qty: 90 TABLET | Refills: 1 | Status: SHIPPED | OUTPATIENT
Start: 2020-04-17 | End: 2020-09-14 | Stop reason: SDUPTHER

## 2020-04-17 RX ORDER — CLOPIDOGREL BISULFATE 75 MG/1
75 TABLET ORAL NIGHTLY
Qty: 90 TABLET | Refills: 1 | Status: SHIPPED | OUTPATIENT
Start: 2020-04-17 | End: 2020-09-29 | Stop reason: SDUPTHER

## 2020-04-17 RX ORDER — INSULIN LISPRO 200 [IU]/ML
INJECTION, SOLUTION SUBCUTANEOUS
Qty: 5 PEN | Refills: 3 | Status: SHIPPED | OUTPATIENT
Start: 2020-04-17 | End: 2020-07-08 | Stop reason: SDUPTHER

## 2020-04-20 RX ORDER — OMEPRAZOLE 40 MG/1
40 CAPSULE, DELAYED RELEASE ORAL DAILY
Qty: 90 CAPSULE | Refills: 3 | Status: SHIPPED | OUTPATIENT
Start: 2020-04-20 | End: 2020-09-16 | Stop reason: SDUPTHER

## 2020-04-28 RX ORDER — DULOXETIN HYDROCHLORIDE 30 MG/1
CAPSULE, DELAYED RELEASE ORAL
Qty: 270 CAPSULE | Refills: 3 | Status: SHIPPED | OUTPATIENT
Start: 2020-04-28 | End: 2020-08-10 | Stop reason: SDUPTHER

## 2020-05-07 ENCOUNTER — TELEPHONE (OUTPATIENT)
Dept: INTERNAL MEDICINE | Age: 65
End: 2020-05-07

## 2020-06-17 NOTE — TELEPHONE ENCOUNTER
Rx requested:  Requested Prescriptions     Pending Prescriptions Disp Refills    Exenatide 2 MG PEN 4 pen 0     Sig: Inject 1 pen into the skin once a week       Last Office Visit:   1/8/2020      Last filled:  5/7/2020    Next Visit Date:  Future Appointments   Date Time Provider Tran Horne   7/20/2020 10:00 AM MARY JANE CT ROOM 1 [facility] RAD   7/28/2020 10:15 AM Jerel Paez MD [address]

## 2020-07-08 RX ORDER — INSULIN LISPRO 200 [IU]/ML
INJECTION, SOLUTION SUBCUTANEOUS
Qty: 5 PEN | Refills: 3 | Status: SHIPPED | OUTPATIENT
Start: 2020-07-08 | End: 2020-09-08 | Stop reason: SDUPTHER

## 2020-07-08 RX ORDER — INSULIN GLARGINE 100 [IU]/ML
INJECTION, SOLUTION SUBCUTANEOUS
Qty: 15 ML | Refills: 0 | Status: SHIPPED | OUTPATIENT
Start: 2020-07-08 | End: 2020-09-08

## 2020-07-08 NOTE — TELEPHONE ENCOUNTER
Rx requested:  Requested Prescriptions     Pending Prescriptions Disp Refills    insulin lispro (HUMALOG KWIKPEN) 200 UNIT/ML SOPN pen 5 pen 3     Sig: 3 units for every 15 grams of CHOs; 65 - 70 units daily       Last Office Visit:   1/8/2020      Last filled:  4/17/2020    Next Visit Date:  Future Appointments   Date Time Provider Tran Horne   7/20/2020 10:00 AM [facility] ROOM 1 [facility]   7/28/2020 10:15 AM Johnie Rockwell MD [address]

## 2020-07-08 NOTE — TELEPHONE ENCOUNTER
Rx requested:  Requested Prescriptions     Pending Prescriptions Disp Refills    LANTUS SOLOSTAR 100 UNIT/ML injection pen [Pharmacy Med Name: Lantus SoloStar 100 UNIT/ML Subcutaneous Solution Pen-injector] 15 mL 0     Sig: **INJECT 30 UNITS INTO THE SKIN 2 TIMES DAILY**       Last Office Visit:   1/8/2020      Last filled:  2/13/2020    Next Visit Date:  Future Appointments   Date Time Provider Tran Pisanoi   7/20/2020 10:00 AM [facility] ROOM 1 [facility]   7/28/2020 10:15 AM Rolando Cabral MD [address]

## 2020-07-16 ENCOUNTER — TELEPHONE (OUTPATIENT)
Dept: CASE MANAGEMENT | Age: 65
End: 2020-07-16

## 2020-07-16 NOTE — TELEPHONE ENCOUNTER
Physician documentation on smoking history and CT Lung Screening reviewed. All required documentation complete. Patient is a current smoker with a 44 pack year history (1 ppd x 44 years) per physician documentation. Surprisekaren Forman left voicemail requesting return call if she has any questions regarding her upcoming CT Lung Screening exam and informed patient of entrance to facility as well as requirement to wear a mask due to COVID precautions at this time.

## 2020-08-04 NOTE — TELEPHONE ENCOUNTER
Rx requested:  Requested Prescriptions     Pending Prescriptions Disp Refills    Exenatide 2 MG PEN 4 pen 0     Sig: Inject 1 pen into the skin once a week       Last Office Visit:   1/8/2020      Last filled:  6/17/2020    Next Visit Date:  Future Appointments   Date Time Provider Tran Horne   8/7/2020 10:20 AM [address] [place] [address]

## 2020-08-07 ENCOUNTER — HOSPITAL ENCOUNTER (OUTPATIENT)
Dept: GENERAL RADIOLOGY | Age: 65
Discharge: HOME | End: 2020-08-09
Payer: MEDICARE

## 2020-08-07 ENCOUNTER — HOSPITAL ENCOUNTER (OUTPATIENT)
Dept: PHYSICAL THERAPY | Age: 65
Setting detail: THERAPIES SERIES
Discharge: HOME | End: 2020-08-07
Payer: MEDICARE

## 2020-08-07 PROCEDURE — 97162 PT EVAL MOD COMPLEX 30 MIN: CPT

## 2020-08-07 PROCEDURE — 72110 X-RAY EXAM L-2 SPINE 4/>VWS: CPT

## 2020-08-07 PROCEDURE — 97110 THERAPEUTIC EXERCISES: CPT

## 2020-08-07 ASSESSMENT — PAIN DESCRIPTION - LOCATION: LOCATION: BACK

## 2020-08-07 ASSESSMENT — PAIN DESCRIPTION - ORIENTATION: ORIENTATION: LOWER

## 2020-08-07 ASSESSMENT — PAIN SCALES - GENERAL: PAINLEVEL_OUTOF10: 4

## 2020-08-07 ASSESSMENT — PAIN DESCRIPTION - PAIN TYPE: TYPE: CHRONIC PAIN

## 2020-08-07 ASSESSMENT — PAIN DESCRIPTION - DESCRIPTORS: DESCRIPTORS: ACHING

## 2020-08-07 NOTE — PROGRESS NOTES
[address]  PHYSICAL THERAPY EVALUATION    Date: 2020  Patient Name: Lourdes Jacob       MRN: [id number]   Account: [de-identified]   : 1955  (63 y.o.)   Gender: female   Referring Practitioner: Dr. Wong Guido                 Diagnosis: M54.5 S/p fusion L spine, Back pain  Treatment Diagnosis: low back pain  Additional Pertinent Hx: OA, DM, Heart disease, fibromyalgia, HTN, COPD         Past Medical History:  has a past medical history of Arthritis, Cerebral artery occlusion with cerebral infarction ([facility] COPD (chronic obstructive pulmonary disease) ([facility] Depression, Headache, Hyperlipidemia, Hypertension, Neuropathy, POTS (postural orthostatic tachycardia syndrome), Restless legs syndrome, and Type 2 diabetes mellitus without complication ([facility] Past Surgical History:   has a past surgical history that includes Elbow surgery (Right, ); Abdominal exploration surgery (); Tonsillectomy; Endometrial ablation (); back surgery (2018); Colonoscopy; other surgical history; and insert/ replace infusn pump,programmable (N/A, 2019). Vital Signs  Patient Currently in Pain: Yes   Pain Screening  Patient Currently in Pain: Yes  Pain Assessment  Pain Assessment: 0-10  Pain Level: 4  Pain Type: Chronic pain  Pain Location: Back  Pain Orientation: Lower  Pain Descriptors: Aching                Type of Home: House  Home Layout: Two level  Home Access: Stairs to enter without rails  Entrance Stairs - Number of Steps: 3  Home Equipment: Cane  ADL Assistance: Independent  Homemaking Assistance: Independent  Ambulation Assistance: Independent(cane PRN)  Transfer Assistance: Independent  Additional Comments: no hx of falls        Subjective:  Subjective: Patient s/p lumbar fusion Dec 2019 and patient continues to complain of low back and bilateral hip pain making housework difficult. Increased pain with housework, walking, and bending.   Denies anything makes it appropriate  Assessment: Body structures, Functions, Activity limitations: Decreased ROM, Decreased strength, Decreased endurance, Increased pain  Assessment: Patient reports back pain making housework difficult. Upon PT evaluation, patient demonstrates poor core and LE strength with increased tightness in bilateral hamstrings. Further physical therapy recommended to improve LE strength, ROM, and decreas pain for improve tolerance to functional activities and housework. Prognosis: Good  Discharge Recommendations: Continue to assess pending progress        Decision Making: Medium Complexity  History: fibromyalgia, OA, DM, back sx  Exam: decreased hamstring ROM, decreased LE strength, increased low back pain  Clinical Presentation: evolving        Plan  Frequency/Duration:  Plan  Times per week: 2  Plan weeks: 4  Current Treatment Recommendations: Strengthening, ROM, Endurance Training, Neuromuscular Re-education, Manual Therapy - Soft Tissue Mobilization, Home Exercise Program, Aquatics, Modalities  Plan Comment: transfer care to [facility] PT         Patient Education  New Education Provided: PT Education: Goals;PT Role;Plan of Care;Home Exercise Program    POST-PAIN     Pain Rating (0-10 pain scale):   4/10  Location and pain description same as pre-treatment unless indicated. Action: [] NA  [] Call Physician  [x] Perform HEP  [x] Meds as prescribed    Evaluation and patient rights have been reviewed and patient agrees with plan of care.   Yes  [x]  No  []   Explain:       Gallagher Fall Risk Assessment  Risk Factor Scale  Score   History of Falls [] Yes  [x] No 25  0 0   Secondary Diagnosis [] Yes  [x] No 15  0 0   Ambulatory Aid [] Furniture  [x] Crutches/cane/walker  [] None/bedrest/wheelchair/nurse 30  15  0 15   IV/Heparin Lock [] Yes  [x] No 20  0 0   Gait/Transferring [] Impaired  [x] Weak  [] Normal/bedrest/immobile 20  10  0 10   Mental Status [] Forgets limitations  [x] Oriented to own ability 15  0 0 Total: 25     Based on the Assessment score: check the appropriate box. []  No intervention needed   Low =   Score of 0-24  [x]  Use standard prevention interventions Moderate =  Score of 24-44   [x] Discuss fall prevention strategies   [x] Indicate moderate falls risk on eval  []  Use high risk prevention interventions High = Score of 45 and higher   [] Discuss fall prevention strategies   [] Provide supervision during treatment time    Goals  Long term goals  Time Frame for Long term goals : 4 weeks  Long term goal 1: Patient will report </= 3/10 pain in low back with light housework. Long term goal 2: Patient will increase bilateral SLR >/= 70 degrees for improved functional tolerance. Long term goal 3: Patient will increase strength in bilateral LEs >/= 4+/5 for improved standing tolerance. Long term goal 4: Modified Oswestry </= 20/50 to demonstrate functional toleance. Long term goal 5: Patient will be independent with HEP.          PT Individual Minutes  Time In: 7097  Time Out: 8673  Minutes: 37  Timed Code Treatment Minutes: 8 Minutes  Procedure Minutes: 29 PT evaluation minutes     Timed Activity Minutes Units   Ther Ex  8  1   Manual          Electronically signed by Radha Carcamo, PT on 8/7/20 at 12:21 PM EDT

## 2020-08-07 NOTE — PLAN OF CARE
[address]     Ph: [phone]  Fax: [phone]    [x] Certification  [] Recertification [x]  Plan of Care  [] Progress Note [] Discharge      To:  Dr. Vilma Terrazas      From:  Kaye Vora, DAVID  Patient: Gail Frias     : 1955  Diagnosis: M54.5 S/p fusion L spine, Back pain     Date: 2020  Treatment Diagnosis: low back pain    Plan of Care/Certification Expiration Date: 20  Progress Report Period from:  2020  to 2020    Total # of Visits to Date: 1   No Show: 0    Canceled Appointment: 0     OBJECTIVE:   Short Term Goals =Long term goals    Long Term Goals - Time Frame for Long term goals : 4 weeks  Goals Current/ Discharge status Met   Long term goal 1: Patient will report </= 3/10 pain in low back with light housework. Patient reports 4/10 at rest. [] yes  [x] no   Long term goal 2: Patient will increase bilateral SLR >/= 70 degrees for improved functional tolerance. PROM RLE (degrees)  RLE General PROM: SLR: 55 deg     PROM LLE (degrees)  LLE General PROM: SLR: 60 deg              Spine  Lumbar: flexion 50% WFLs, bilateral SB 25% WFLs, ext 10 deg        [] yes  [x] no   Long term goal 3: Patient will increase strength in bilateral LEs >/= 4+/5 for improved standing tolerance. Strength RLE  R Hip Flexion: 4-/5  R Hip Extension: 3-/5  R Hip ABduction: 3+/5  R Hip Internal Rotation: 4/5  R Hip External Rotation: 4/5  R Knee Flexion: 4+/5  R Knee Extension: 4+/5  R Ankle Dorsiflexion: 4+/5  Strength LLE  L Hip Flexion: 4-/5  L Hip Extension: 3-/5  L Hip ABduction: 3+/5  L Hip Internal Rotation: 4/5  L Hip External Rotation: 4/5  L Knee Flexion: 4+/5  L Knee Extension: 4+/5  L Ankle Dorsiflexion: 4+/5        Strength Other  Other: Decreased core strength based off functional mobility.    [] yes  [x] no   Long term goal 4: Modified Oswestry </= 20/50 to demonstrate functional nereyda. Modified Oswestry: 26/50 [] yes  [x] no   Long term goal 5: Patient will be independent with HEP. Patient issued HEP. [] yes  [x] no        Body structures, Functions, Activity limitations: Decreased ROM, Decreased strength, Decreased endurance, Increased pain  Assessment: Patient reports back pain making housework difficult. Upon PT evaluation, patient demonstrates poor core and LE strength with increased tightness in bilateral hamstrings. Further physical therapy recommended to improve LE strength, ROM, and decreas pain for improve tolerance to functional activities and housework. Prognosis: Good  Discharge Recommendations: Continue to assess pending progress      PT Education: Goals;PT Role;Plan of Care;Home Exercise Program    PLAN: [x] Evaluate and Treat  Frequency/Duration:  Plan  Times per week: 2  Plan weeks: 4  Current Treatment Recommendations: Strengthening, ROM, Endurance Training, Neuromuscular Re-education, Manual Therapy - Soft Tissue Mobilization, Home Exercise Program, Aquatics, Modalities  Plan Comment: transfer care to [facility] PT     Precautions:                            Patient Status:[x] Continue/ Initiate plan of Care    [] Discharge PT. Recommend pt continue with HEP. [] Additional visits requested, Please re-certify for additional visits:          Signature: Electronically signed by Shilpa Marin PT on 8/7/20 at 12:23 PM EDT      If you have any questions or concerns, please don't hesitate to call. Thank you for your referral.    I have reviewed this plan of care and certify a need for medically necessary rehabilitation services.     Physician Signature:__________________________________________________________  Date:  Please sign and return

## 2020-08-10 RX ORDER — DULOXETIN HYDROCHLORIDE 30 MG/1
90 CAPSULE, DELAYED RELEASE ORAL DAILY
Qty: 90 CAPSULE | Refills: 3 | Status: SHIPPED | OUTPATIENT
Start: 2020-08-10 | End: 2021-04-05

## 2020-08-10 NOTE — TELEPHONE ENCOUNTER
Rx requested:  Requested Prescriptions     Pending Prescriptions Disp Refills    DULoxetine (CYMBALTA) 30 MG extended release capsule 270 capsule 3       Last Office Visit:   1/8/2020      Last filled:  4/28/2020    Next Visit Date:  Future Appointments   Date Time Provider Tran Coleen   8/31/2020  1:00 PM Deisy Jackson PTA [address]   9/2/2020  1:00 PM Keven Brown, [address]   9/9/2020  1:00 PM Deisy Jackson PTA [address]   9/11/2020  1:00 PM Deisy Jackson PTA [address]   9/14/2020  1:00 PM Deisy Jackson PTA [address]   9/16/2020  1:40 PM Keven Brown, [address] AM PT [facility]   9/21/2020  1:00 PM Deisy Jackson PTA [address]   9/23/2020  1:00 PM Deisy Jackson PTA [address]

## 2020-08-31 ENCOUNTER — HOSPITAL ENCOUNTER (OUTPATIENT)
Dept: PHYSICAL THERAPY | Age: 65
Setting detail: THERAPIES SERIES
Discharge: HOME | End: 2020-08-31
Payer: MEDICARE

## 2020-08-31 PROCEDURE — 97113 AQUATIC THERAPY/EXERCISES: CPT

## 2020-08-31 ASSESSMENT — PAIN DESCRIPTION - LOCATION: LOCATION: BACK

## 2020-08-31 ASSESSMENT — PAIN DESCRIPTION - ORIENTATION: ORIENTATION: LOWER

## 2020-08-31 ASSESSMENT — PAIN DESCRIPTION - DESCRIPTORS: DESCRIPTORS: ACHING

## 2020-08-31 ASSESSMENT — PAIN SCALES - GENERAL: PAINLEVEL_OUTOF10: 5

## 2020-08-31 ASSESSMENT — PAIN DESCRIPTION - PAIN TYPE: TYPE: CHRONIC PAIN

## 2020-08-31 NOTE — PROGRESS NOTES
[id number] [address]  Outpatient Physical Therapy    Treatment Note        Date: 2020  Patient: Pooja Reaves  : 1955  ACCT #: [de-identified]  Referring Practitioner: Dr. Jason Dumont  Diagnosis: M54.5 S/p fusion L spine, Back pain    Visit Information:  PT Visit Information  Onset Date: (Dec 2019 back surgery)  PT Insurance Information: [facility] Medicare  Total # of Visits Approved: ($25 copay)  Total # of Visits to Date: 2  Plan of Care/Certification Expiration Date: 20  No Show: 0  Canceled Appointment: 0  Progress Note Counter:  (PN due 20)    Subjective: Pt reports 5/10 pain currently stating \"two days ago I got out of a chair wrong and it's been sore since. \"     HEP Compliance:  [x] Good [] Fair [] Poor [] Reports not doing due to:    Vital Signs  Patient Currently in Pain: Yes   Pain Screening  Patient Currently in Pain: Yes  Pain Assessment  Pain Assessment: 0-10  Pain Level: 5  Pain Type: Chronic pain  Pain Location: Back  Pain Orientation: Lower  Pain Descriptors: Aching    OBJECTIVE:   Exercises  Exercise 4: Aquatics  Exercise 5: ambulation: forward, lateral, retro x3 ea  Exercise 6: sink exercises with TA isometric x10 (squats on first step for improved ROM)  Exercise 7: hip circles x10 cw/ccw  Exercise 8: DB hang x5 mins  Exercise 9: DB bicycle x3 min  Exercise 10: hamstring stretch 3x30'' b/l  Exercise 11: DB hip abd/add x10; flex/ext x10  Exercise 20: EP: cont current    Strength: [x] NT  [] MMT completed:  ROM: [x] NT  [] ROM measurements:    *Indicates exercise, modality, or manual techniques to be initiated when appropriate    Assessment: Body structures, Functions, Activity limitations: Decreased ROM, Decreased strength, Decreased endurance, Increased pain  Assessment: initiated aquatics this date for inc strength and ROM of b/l LE's. VC's throughout session for TA activation and postural awareness.  Pt reports dec LBP while in pool, though pain remains 5/10 upon exiting pool.  Treatment Diagnosis: low back pain  Prognosis: Good       Goals:  Long term goals  Time Frame for Long term goals : 4 weeks  Long term goal 1: Patient will report </= 3/10 pain in low back with light housework. Long term goal 2: Patient will increase bilateral SLR >/= 70 degrees for improved functional tolerance. Long term goal 3: Patient will increase strength in bilateral LEs >/= 4+/5 for improved standing tolerance. Long term goal 4: Modified Oswestry </= 20/50 to demonstrate functional toleance. Long term goal 5: Patient will be independent with HEP. Progress toward goals: inc strength, rom, dec pain    POST-PAIN       Pain Rating (0-10 pain scale):  5 /10   Location and pain description same as pre-treatment unless indicated. Action: [] NA   [x] Perform HEP  [] Meds as prescribed  [] Modalities as prescribed   [] Call Physician     Frequency/Duration:  Plan  Times per week: 2  Plan weeks: 4  Current Treatment Recommendations: Strengthening, ROM, Endurance Training, Neuromuscular Re-education, Manual Therapy - Soft Tissue Mobilization, Home Exercise Program, Aquatics, Modalities  Plan Comment: transfer care to [facility]     Pt to continue current HEP. See objective section for any therapeutic exercise changes, additions or modifications this date.     PT Individual Minutes  Time In: 1300  Time Out: 0879  Minutes: 38  Timed Code Treatment Minutes: 38 Minutes  Procedure Minutes: 0     Timed Activity Minutes Units   AQ Ther Ex 38 3       Signature:  Electronically signed by Gretel Guevara PTA on 8/31/20 at 1:04 PM EDT

## 2020-09-02 ENCOUNTER — HOSPITAL ENCOUNTER (OUTPATIENT)
Dept: PHYSICAL THERAPY | Age: 65
Setting detail: THERAPIES SERIES
Discharge: HOME | End: 2020-09-02
Payer: MEDICARE

## 2020-09-02 PROCEDURE — 97113 AQUATIC THERAPY/EXERCISES: CPT

## 2020-09-02 ASSESSMENT — PAIN DESCRIPTION - ORIENTATION: ORIENTATION: LOWER

## 2020-09-02 ASSESSMENT — PAIN DESCRIPTION - PAIN TYPE: TYPE: CHRONIC PAIN

## 2020-09-02 ASSESSMENT — PAIN SCALES - GENERAL: PAINLEVEL_OUTOF10: 6

## 2020-09-02 ASSESSMENT — PAIN DESCRIPTION - LOCATION: LOCATION: BACK

## 2020-09-02 ASSESSMENT — PAIN DESCRIPTION - DESCRIPTORS: DESCRIPTORS: SORE

## 2020-09-02 NOTE — PROGRESS NOTES
[id number] [address]  Outpatient Physical Therapy    Treatment Note        Date: 2020  Patient: Rocky Carlton  : 1955  ACCT #: [de-identified]  Referring Practitioner: Dr. Beena Lopez  Diagnosis: M54.5 S/p fusion L spine, Back pain    Visit Information:  PT Visit Information  Onset Date: (Dec 2019 back surgery)  PT Insurance Information: [facility] Medicare  Total # of Visits Approved: ($25 copay)  Total # of Visits to Date: 3  Plan of Care/Certification Expiration Date: 20  No Show: 0  Canceled Appointment: 0  Progress Note Counter: 3/8 (PN due 20)    Subjective: Pt stated shes sore from Monday. HEP Compliance:  [x] Good [] Fair [] Poor [] Reports not doing due to:    Vital Signs  Patient Currently in Pain: Yes   Pain Screening  Patient Currently in Pain: Yes  Pain Assessment  Pain Assessment: 0-10  Pain Level: 6  Pain Type: Chronic pain  Pain Location: Back  Pain Orientation: Lower  Pain Descriptors: Sore    OBJECTIVE:   Exercises  Exercise 4: Aquatics  Exercise 5: ambulation: forward, lateral, retro x3 ea  Exercise 6: sink exercises with TA isometric x10 (squats on first step for improved ROM)  Exercise 7: hip circles x10 cw/ccw  Exercise 8: DB hang x5 mins  Exercise 9: DB bicycle x3 min  Exercise 10: hamstring stretch 3x30'' b/l  Exercise 11: DB hip abd/add x10; flex/ext x10    Strength: [] NT  [x] MMT completed:  Strength RLE  R Hip Flexion: 5/5  R Hip Extension: 4-/5  R Hip ABduction: 4/5  R Hip Internal Rotation: 4+/5  R Hip External Rotation: 4+/5  R Knee Flexion: 4+/5  R Knee Extension: 4+/5  R Ankle Dorsiflexion: 5/5  Strength LLE  L Hip Flexion: 4+/5  L Hip Extension: 4-/5  L Hip ABduction: 4/5  L Hip Internal Rotation: 5/5  L Hip External Rotation: 4+/5  L Knee Flexion: 5/5  L Knee Extension: 5/5  L Ankle Dorsiflexion: 5/5    ROM: [x] NT  [] ROM measurements:    *Indicates exercise, modality, or manual techniques to be initiated when appropriate    Assessment:    Body structures,

## 2020-09-02 NOTE — PROGRESS NOTES
Physical Therapy  [address]     Ph: [phone]  Fax: [phone]    [] Certification  [] Recertification []  Plan of Care  [x] Progress Note [] Discharge      To:  Dr. Natty Humphrey, PT Patient: Neela Andrew     : 1955  Diagnosis: M54.5 S/p fusion L spine, Back pain     Date: 2020  Treatment Diagnosis: low back pain    Plan of Care/Certification Expiration Date: 20  Progress Report Period from:  2020  to 2020    Total # of Visits to Date: 3   No Show: 0    Canceled Appointment: 0     OBJECTIVE:      Long Term Goals - Time Frame for Long term goals : 4 weeks  Goals Current/ Discharge status Met   Long term goal 1: Patient will report </= 3/10 pain in low back with light housework. Pt is currently rating her pain at 5/10 [] yes  [x] no   Long term goal 2: Patient will increase bilateral SLR >/= 70 degrees for improved functional tolerance. N/T due to clinical setting [] yes  [x] no   Long term goal 3: Patient will increase strength in bilateral LEs >/= 4+/5 for improved standing tolerance. Strength RLE  R Hip Flexion: 5/5  R Hip Extension: 4-/5  R Hip ABduction: 4/5  R Hip Internal Rotation: 4+/5  R Hip External Rotation: 4+/5  R Knee Flexion: 4+/5  R Knee Extension: 4+/5  R Ankle Dorsiflexion: 5/5  Strength LLE  L Hip Flexion: 4+/5  L Hip Extension: 4-/5  L Hip ABduction: 4/5  L Hip Internal Rotation: 5/5  L Hip External Rotation: 4+/5  L Knee Flexion: 5/5  L Knee Extension: 5/5  L Ankle Dorsiflexion: 5/5 [] yes  [x] no flex, ext, abd   Long term goal 4: Modified Oswestry </= 20/50 to demonstrate functional toleance. /50 [] yes  [x] no   Long term goal 5: Patient will be independent with HEP. Pt is independent with HEP.  [x] yes  [] no        Body structures, Functions, Activity limitations: Decreased ROM, Decreased strength, Decreased endurance, Increased

## 2020-09-08 RX ORDER — INSULIN GLARGINE 100 [IU]/ML
30 INJECTION, SOLUTION SUBCUTANEOUS 2 TIMES DAILY
Qty: 15 PEN | Refills: 1 | Status: SHIPPED | OUTPATIENT
Start: 2020-09-08 | End: 2020-09-16 | Stop reason: SDUPTHER

## 2020-09-08 RX ORDER — INSULIN LISPRO 200 [IU]/ML
INJECTION, SOLUTION SUBCUTANEOUS
Qty: 15 PEN | Refills: 3 | Status: SHIPPED | OUTPATIENT
Start: 2020-09-08 | End: 2020-09-16 | Stop reason: SDUPTHER

## 2020-09-08 RX ORDER — EXENATIDE 2 MG/.65ML
INJECTION, SUSPENSION, EXTENDED RELEASE SUBCUTANEOUS
Qty: 12 PEN | Refills: 1 | Status: SHIPPED | OUTPATIENT
Start: 2020-09-08 | End: 2020-09-16 | Stop reason: SDUPTHER

## 2020-09-08 NOTE — TELEPHONE ENCOUNTER
Rx requested:  Requested Prescriptions     Pending Prescriptions Disp Refills    Exenatide (BYDUREON) 2 MG PEN 12 pen 1     Sig: Inject 1 pen into the skin once weekly    insulin lispro (HUMALOG KWIKPEN) 200 UNIT/ML SOPN pen 15 pen 3     Sig: 3 units for every 15 grams of CHOs; 65 - 70 units daily    insulin glargine (LANTUS SOLOSTAR) 100 UNIT/ML injection pen 15 pen 1     Sig: Inject 30 Units into the skin 2 times daily       Last Office Visit:   1/8/2020      Last filled:  7/8/2020    Next Visit Date:  Future Appointments   Date Time Provider Tran Horne   9/9/2020  1:00 PM Chana Rader [facility] [address]   9/11/2020  1:00 PM Chana Rader PTA [address]   9/14/2020  1:00 PM Chana Rader PTA [address]   9/16/2020  1:40 PM Bobbi Denise, [address] AM PT 10 [facility]   9/21/2020  1:00 PM Bobbi Denise [address] AM PT 10 [facility]   9/23/2020  1:00 PM Bobbi Denise [address] AM PT 10 [facility]

## 2020-09-09 ENCOUNTER — HOSPITAL ENCOUNTER (OUTPATIENT)
Dept: PHYSICAL THERAPY | Age: 65
Setting detail: THERAPIES SERIES
Discharge: HOME | End: 2020-09-09
Payer: MEDICARE

## 2020-09-09 PROCEDURE — 97113 AQUATIC THERAPY/EXERCISES: CPT

## 2020-09-09 ASSESSMENT — PAIN DESCRIPTION - PAIN TYPE: TYPE: CHRONIC PAIN

## 2020-09-09 ASSESSMENT — PAIN DESCRIPTION - LOCATION: LOCATION: BACK

## 2020-09-09 ASSESSMENT — PAIN DESCRIPTION - ORIENTATION: ORIENTATION: LOWER

## 2020-09-09 ASSESSMENT — PAIN DESCRIPTION - DESCRIPTORS: DESCRIPTORS: SORE

## 2020-09-09 ASSESSMENT — PAIN SCALES - GENERAL: PAINLEVEL_OUTOF10: 3

## 2020-09-11 ENCOUNTER — HOSPITAL ENCOUNTER (OUTPATIENT)
Dept: PHYSICAL THERAPY | Age: 65
Setting detail: THERAPIES SERIES
Discharge: HOME | End: 2020-09-11
Payer: MEDICARE

## 2020-09-11 PROCEDURE — 97110 THERAPEUTIC EXERCISES: CPT

## 2020-09-11 ASSESSMENT — PAIN SCALES - GENERAL: PAINLEVEL_OUTOF10: 3

## 2020-09-11 ASSESSMENT — PAIN DESCRIPTION - ORIENTATION: ORIENTATION: LOWER

## 2020-09-11 ASSESSMENT — PAIN DESCRIPTION - PAIN TYPE: TYPE: CHRONIC PAIN

## 2020-09-11 ASSESSMENT — PAIN DESCRIPTION - LOCATION: LOCATION: BACK

## 2020-09-11 ASSESSMENT — PAIN DESCRIPTION - DESCRIPTORS: DESCRIPTORS: SORE

## 2020-09-11 NOTE — PROGRESS NOTES
[address] [address]  Outpatient Physical Therapy    Treatment Note        Date: 2020  Patient: Matheus Huntley  : 1955  ACCT #: [de-identified]  Referring Practitioner: Dr. Lupe De La Torre  Diagnosis: M54.5 S/p fusion L spine, Back pain    Visit Information:  PT Visit Information  Onset Date: (Dec 2019 back surgery)  PT Insurance Information: [facility] Medicare  Total # of Visits Approved: ($25 copay)  Total # of Visits to Date: 5  Plan of Care/Certification Expiration Date: 20  No Show: 0  Progress Note Due Date: 10/02/20  Canceled Appointment: 0  Progress Note Counter:  (PN due 10/2/20)    Subjective: Pt reports she gets really sore about 30 mins after appts. Pt states the pain inc to 5/10 and lasts the remainder of the day, then the next AM she feels \"tight\" but it goes away after she gets up/moving. HEP Compliance:  [x] Good [] Fair [] Poor [] Reports not doing due to:    Vital Signs  Patient Currently in Pain: Yes   Pain Screening  Patient Currently in Pain: Yes  Pain Assessment  Pain Assessment: 0-10  Pain Level: 3  Pain Type: Chronic pain  Pain Location: Back  Pain Orientation: Lower  Pain Descriptors: Sore    OBJECTIVE:   Exercises  Exercise 4: Aquatics  Exercise 5: ambulation: forward, lateral, retro, marching x3 ea  Exercise 6: sink exercises with TA isometric x12 (squats on first step for improved ROM)  Exercise 7: hip circles x12 cw/ccw  Exercise 8: DB hang x5 mins  Exercise 9: DB bicycle x3 min  Exercise 10: hamstring stretch 3x30'' b/l  Exercise 11: DB hip abd/add x12; flex/ext x12  Exercise 12: KB push/pull x15  Exercise 20: HEP: cont current    Strength: [x] NT  [] MMT completed:    ROM: [x] NT  [] ROM measurements:    *Indicates exercise, modality, or manual techniques to be initiated when appropriate    Assessment:    Body structures, Functions, Activity limitations: Decreased ROM, Decreased strength, Decreased endurance, Increased pain  Assessment: cont aquatics per POC for inc strength and ROM. added marches to gait drills and KB push/pull w/o c/o inc pain. Pt reporting dec pain to 2/10 post tx stating \"I'm really surprised I feel better right now. \"  Treatment Diagnosis: low back pain  Prognosis: Good     Goals:  Long term goals  Time Frame for Long term goals : 4 weeks  Long term goal 1: Patient will report </= 3/10 pain in low back with light housework. Long term goal 2: Patient will increase bilateral SLR >/= 70 degrees for improved functional tolerance. Long term goal 3: Patient will increase strength in bilateral LEs >/= 4+/5 for improved standing tolerance. Long term goal 4: Modified Oswestry </= 20/50 to demonstrate functional toleance. Long term goal 5: Patient will be independent with HEP. Progress toward goals: inc strength, rom, dec pain    POST-PAIN       Pain Rating (0-10 pain scale):   2/10   Location and pain description same as pre-treatment unless indicated. Action: [] NA   [x] Perform HEP  [] Meds as prescribed  [] Modalities as prescribed   [] Call Physician     Frequency/Duration:  Plan  Times per week: 2  Plan weeks: 4  Current Treatment Recommendations: Strengthening, ROM, Endurance Training, Neuromuscular Re-education, Manual Therapy - Soft Tissue Mobilization, Home Exercise Program, Aquatics, Modalities  Plan Comment: transfer care to Anastacio Patrick PT     Pt to continue current HEP. See objective section for any therapeutic exercise changes, additions or modifications this date.          PT Individual Minutes  Time In: 1300  Time Out: 3124  Minutes: 38  Timed Code Treatment Minutes: 38 Minutes  Procedure Minutes: 0     Timed Activity Minutes Units   AQ Ther Ex 38 3       Signature:  Electronically signed by Austin Ospina PTA on 9/11/20 at 1:07 PM EDT

## 2020-09-14 ENCOUNTER — HOSPITAL ENCOUNTER (OUTPATIENT)
Dept: PHYSICAL THERAPY | Age: 65
Setting detail: THERAPIES SERIES
Discharge: HOME | End: 2020-09-14
Payer: MEDICARE

## 2020-09-14 PROCEDURE — 97113 AQUATIC THERAPY/EXERCISES: CPT

## 2020-09-14 RX ORDER — SIMVASTATIN 40 MG
40 TABLET ORAL NIGHTLY
Qty: 90 TABLET | Refills: 1 | Status: SHIPPED | OUTPATIENT
Start: 2020-09-14 | End: 2020-09-29 | Stop reason: SDUPTHER

## 2020-09-14 ASSESSMENT — PAIN DESCRIPTION - DESCRIPTORS: DESCRIPTORS: SORE

## 2020-09-14 ASSESSMENT — PAIN DESCRIPTION - LOCATION: LOCATION: BACK

## 2020-09-14 ASSESSMENT — PAIN DESCRIPTION - PAIN TYPE: TYPE: CHRONIC PAIN

## 2020-09-14 ASSESSMENT — PAIN SCALES - GENERAL: PAINLEVEL_OUTOF10: 4

## 2020-09-14 ASSESSMENT — PAIN DESCRIPTION - ORIENTATION: ORIENTATION: LOWER

## 2020-09-14 NOTE — PROGRESS NOTES
feeling a little better now. \" Pt cont to be slow to progress d/t cont'd muscle soreness after each session. Treatment Diagnosis: low back pain  Prognosis: Good     Goals:  Long term goals  Time Frame for Long term goals : 4 weeks  Long term goal 1: Patient will report </= 3/10 pain in low back with light housework. Long term goal 2: Patient will increase bilateral SLR >/= 70 degrees for improved functional tolerance. Long term goal 3: Patient will increase strength in bilateral LEs >/= 4+/5 for improved standing tolerance. Long term goal 4: Modified Oswestry </= 20/50 to demonstrate functional toleance. Long term goal 5: Patient will be independent with HEP. Progress toward goals: inc strength, rom, dec pain    POST-PAIN       Pain Rating (0-10 pain scale):  3 /10   Location and pain description same as pre-treatment unless indicated. Action: [] NA   [x] Perform HEP  [] Meds as prescribed  [] Modalities as prescribed   [] Call Physician     Frequency/Duration:  Plan  Times per week: 2  Plan weeks: 4  Current Treatment Recommendations: Strengthening, ROM, Endurance Training, Neuromuscular Re-education, Manual Therapy - Soft Tissue Mobilization, Home Exercise Program, Aquatics, Modalities  Plan Comment: transfer care to Jesus Julian PT     Pt to continue current HEP. See objective section for any therapeutic exercise changes, additions or modifications this date.     PT Individual Minutes  Time In: 1300  Time Out: 4967  Minutes: 38  Timed Code Treatment Minutes: 38 Minutes  Procedure Minutes: 0     Timed Activity Minutes Units   AQ Ther Ex 38 3       Signature:  Electronically signed by Jimmie Mata PTA on 9/14/20 at 1:00 PM EDT

## 2020-09-16 ENCOUNTER — HOSPITAL ENCOUNTER (OUTPATIENT)
Dept: PHYSICAL THERAPY | Age: 65
Setting detail: THERAPIES SERIES
Discharge: HOME | End: 2020-09-16
Payer: MEDICARE

## 2020-09-16 ENCOUNTER — OFFICE VISIT (OUTPATIENT)
Dept: FAMILY MEDICINE CLINIC | Age: 65
End: 2020-09-16
Payer: MEDICARE

## 2020-09-16 VITALS
OXYGEN SATURATION: 97 % | BODY MASS INDEX: 38.98 KG/M2 | DIASTOLIC BLOOD PRESSURE: 74 MMHG | TEMPERATURE: 97.3 F | HEIGHT: 63 IN | WEIGHT: 220 LBS | HEART RATE: 70 BPM | SYSTOLIC BLOOD PRESSURE: 122 MMHG

## 2020-09-16 PROBLEM — E66.01 MORBIDLY OBESE (HCC): Status: ACTIVE | Noted: 2020-09-16

## 2020-09-16 LAB — HBA1C MFR BLD: 6.2 %

## 2020-09-16 PROCEDURE — 97113 AQUATIC THERAPY/EXERCISES: CPT

## 2020-09-16 PROCEDURE — 2022F DILAT RTA XM EVC RTNOPTHY: CPT | Performed by: FAMILY MEDICINE

## 2020-09-16 PROCEDURE — 3023F SPIROM DOC REV: CPT | Performed by: FAMILY MEDICINE

## 2020-09-16 PROCEDURE — 4004F PT TOBACCO SCREEN RCVD TLK: CPT | Performed by: FAMILY MEDICINE

## 2020-09-16 PROCEDURE — G8417 CALC BMI ABV UP PARAM F/U: HCPCS | Performed by: FAMILY MEDICINE

## 2020-09-16 PROCEDURE — G8926 SPIRO NO PERF OR DOC: HCPCS | Performed by: FAMILY MEDICINE

## 2020-09-16 PROCEDURE — 83036 HEMOGLOBIN GLYCOSYLATED A1C: CPT | Performed by: FAMILY MEDICINE

## 2020-09-16 PROCEDURE — 99214 OFFICE O/P EST MOD 30 MIN: CPT | Performed by: FAMILY MEDICINE

## 2020-09-16 PROCEDURE — 3017F COLORECTAL CA SCREEN DOC REV: CPT | Performed by: FAMILY MEDICINE

## 2020-09-16 PROCEDURE — 3044F HG A1C LEVEL LT 7.0%: CPT | Performed by: FAMILY MEDICINE

## 2020-09-16 PROCEDURE — G8427 DOCREV CUR MEDS BY ELIG CLIN: HCPCS | Performed by: FAMILY MEDICINE

## 2020-09-16 RX ORDER — METFORMIN HYDROCHLORIDE 500 MG/1
500 TABLET, EXTENDED RELEASE ORAL 2 TIMES DAILY
Qty: 180 TABLET | Refills: 1 | Status: SHIPPED | OUTPATIENT
Start: 2020-09-16 | End: 2021-03-09

## 2020-09-16 RX ORDER — MELOXICAM 15 MG/1
15 TABLET ORAL DAILY
Qty: 90 TABLET | Refills: 3 | Status: SHIPPED | OUTPATIENT
Start: 2020-09-16 | End: 2021-10-06

## 2020-09-16 RX ORDER — VENLAFAXINE HYDROCHLORIDE 75 MG/1
75 CAPSULE, EXTENDED RELEASE ORAL DAILY
Qty: 90 CAPSULE | Refills: 3 | Status: SHIPPED | OUTPATIENT
Start: 2020-09-16 | End: 2021-04-05

## 2020-09-16 RX ORDER — OMEPRAZOLE 40 MG/1
40 CAPSULE, DELAYED RELEASE ORAL DAILY
Qty: 90 CAPSULE | Refills: 3 | Status: SHIPPED | OUTPATIENT
Start: 2020-09-16 | End: 2021-10-06

## 2020-09-16 RX ORDER — EXENATIDE 2 MG/.65ML
INJECTION, SUSPENSION, EXTENDED RELEASE SUBCUTANEOUS
Qty: 12 PEN | Refills: 1 | Status: SHIPPED | OUTPATIENT
Start: 2020-09-16 | End: 2021-03-05

## 2020-09-16 RX ORDER — INSULIN LISPRO 200 [IU]/ML
INJECTION, SOLUTION SUBCUTANEOUS
Qty: 15 PEN | Refills: 1 | Status: SHIPPED | OUTPATIENT
Start: 2020-09-16 | End: 2020-11-25

## 2020-09-16 RX ORDER — INSULIN GLARGINE 100 [IU]/ML
30 INJECTION, SOLUTION SUBCUTANEOUS 2 TIMES DAILY
Qty: 15 PEN | Refills: 1 | Status: SHIPPED | OUTPATIENT
Start: 2020-09-16 | End: 2020-11-25

## 2020-09-16 RX ORDER — OLANZAPINE 20 MG/1
20 TABLET ORAL NIGHTLY
Qty: 90 TABLET | Refills: 3 | Status: SHIPPED | OUTPATIENT
Start: 2020-09-16 | End: 2021-09-14

## 2020-09-16 ASSESSMENT — ENCOUNTER SYMPTOMS
COUGH: 0
RHINORRHEA: 0
ABDOMINAL PAIN: 0
SHORTNESS OF BREATH: 0
CONSTIPATION: 0
WHEEZING: 0
DIARRHEA: 0
SORE THROAT: 0

## 2020-09-16 ASSESSMENT — PAIN SCALES - GENERAL: PAINLEVEL_OUTOF10: 3

## 2020-09-16 ASSESSMENT — PAIN DESCRIPTION - LOCATION: LOCATION: BACK

## 2020-09-16 ASSESSMENT — PAIN DESCRIPTION - PAIN TYPE: TYPE: CHRONIC PAIN

## 2020-09-16 ASSESSMENT — PAIN DESCRIPTION - ORIENTATION: ORIENTATION: LOWER

## 2020-09-16 ASSESSMENT — PAIN DESCRIPTION - DESCRIPTORS: DESCRIPTORS: SORE

## 2020-09-16 NOTE — PROGRESS NOTES
[address] [address]  Outpatient Physical Therapy    Treatment Note        Date: 2020  Patient: Osmani Robles  : 1955  ACCT #: [de-identified]  Referring Practitioner: Dr. Migel Cervantes  Diagnosis: M54.5 S/p fusion L spine, Back pain    Visit Information:  PT Visit Information  Onset Date: (Dec 2019 back surgery)  PT Insurance Information: [facility] Medicare  Total # of Visits Approved: ($25 copay)  Total # of Visits to Date: 7  Plan of Care/Certification Expiration Date: 20  No Show: 0  Canceled Appointment: 0  Progress Note Counter:  (PN due 10/2/20)    Subjective: Pt reports the pulled muscle felt much better after LV, though pt reports the usual muscle soreness after aquatic sessions. Discussed w/ pt potentially transitioning to land (1x pool/1x land) after NV vs D/C. Pt leaning more towards transitioning to land vs D/C, but will make decision by NV. HEP Compliance:  [x] Good [] Fair [] Poor [] Reports not doing due to:    Vital Signs  Patient Currently in Pain: Yes   Pain Screening  Patient Currently in Pain: Yes  Pain Assessment  Pain Assessment: 0-10  Pain Level: 3  Pain Type: Chronic pain  Pain Location: Back  Pain Orientation: Lower  Pain Descriptors: Sore    OBJECTIVE:   Exercises  Exercise 4: Aquatics  Exercise 5: ambulation: forward, lateral, retro, marching x3 ea  Exercise 6: sink exercises with TA isometric x14 (squats on first step for improved ROM)  Exercise 7: hip circles x14 cw/ccw  Exercise 8: DB hang x5 mins  Exercise 9: DB bicycle x3 min  Exercise 10: hamstring stretch 3x30'' b/l  Exercise 11: DB hip abd/add x14; flex/ext x14  Exercise 12: KB push/pull x15  Exercise 13: KB [id number]    Strength: [x] NT  [] MMT completed:    ROM: [x] NT  [] ROM measurements:     *Indicates exercise, modality, or manual techniques to be initiated when appropriate    Assessment:    Body structures, Functions, Activity limitations: Decreased ROM, Decreased strength, Decreased endurance, Increased pain  Assessment: Added KB rotations within a tolerable range w/o significant c/o pain. Pt denying any inc in pain post-tx session, though notes fatigue. x1 VC needed duirng HS stretch for posture w/ good carryover. Treatment Diagnosis: low back pain  Prognosis: Good     Goals:  Long term goals  Time Frame for Long term goals : 4 weeks  Long term goal 1: Patient will report </= 3/10 pain in low back with light housework. Long term goal 2: Patient will increase bilateral SLR >/= 70 degrees for improved functional tolerance. Long term goal 3: Patient will increase strength in bilateral LEs >/= 4+/5 for improved standing tolerance. Long term goal 4: Modified Oswestry </= 20/50 to demonstrate functional toleance. Long term goal 5: Patient will be independent with HEP. Progress toward goals: inc strength, rom, dec pain    POST-PAIN       Pain Rating (0-10 pain scale):   3/10   Location and pain description same as pre-treatment unless indicated. Action: [] NA   [x] Perform HEP  [] Meds as prescribed  [] Modalities as prescribed   [] Call Physician     Frequency/Duration:  Plan  Times per week: 2  Plan weeks: 4  Specific instructions for Next Treatment: PN due  Current Treatment Recommendations: Strengthening, ROM, Endurance Training, Neuromuscular Re-education, Manual Therapy - Soft Tissue Mobilization, Home Exercise Program, Aquatics, Modalities  Plan Comment: transfer care to Natty Goldman PT     Pt to continue current HEP. See objective section for any therapeutic exercise changes, additions or modifications this date.     PT Individual Minutes  Time In: 7772  Time Out: 4246  Minutes: 38  Timed Code Treatment Minutes: 38 Minutes  Procedure Minutes: 0     Timed Activity Minutes Units   Ther Ex 38 3       Signature:  Electronically signed by Lupe Butt PTA on 9/16/20 at 1:46 PM EDT

## 2020-09-16 NOTE — PROGRESS NOTES
[address] [facility] [address] [place] [address] PRIMARY CARE  [address] [place] [id number]  Dept: [phone]  Dept Fax: [phone]  Loc: [phone]   Chief Complaint  Chief Complaint   Patient presents with    Diabetes     3m f/u, med rf's change duloxetine d/t ins        HPI:  63 y. o.female who presents for DM2:    DM2: a1c 6.2 from 5.7; compliant with metformin, bydureon, lantus 22 BID, lispro 3u/15g carb(usual 9-12u TID AC); compliant with diet; No excessive thirst, urination, or blurry vision. Mood: anxiety and depression; has been on zyprexa and cymbalta; insurance doesn't cover cymbalta and she wants to switch to effexor.      Past Medical History:   Diagnosis Date    Arthritis     Cerebral artery occlusion with cerebral infarction [facility]) 2016 June    TIA / sx left sided weakness & fell & unable to get up off floor for several hours    COPD (chronic obstructive pulmonary disease) ([facility] [address]     smoking habit > 40 yrs    Depression     Headache     Hyperlipidemia     meds > 10 yrs    Hypertension     meds since TIA / 6/2016    Neuropathy     POTS (postural orthostatic tachycardia syndrome)     2000's    Restless legs syndrome     Type 2 diabetes mellitus without complication (HCC)     hx > 7 yrs     Past Surgical History:   Procedure Laterality Date    ABDOMINAL EXPLORATION SURGERY  1970s    due to abdominal pain / no definite dx    BACK SURGERY  12/24/2018    l[facility] Right 1989    due to tendonitis    ENDOMETRIAL ABLATION  1990s    AUB    INSERT/ REPLACE INFUSN PUMP,PROGRAMMABLE N/A 5/29/2019    REMOVAL OF INTRATHECAL PUMP RESERVOIR performed by Escobar Styles MD at [place] [address]      pain pump placement 2010 & replacement 2016    TONSILLECTOMY      age 24     Social History     Socioeconomic History    Marital status:      Spouse name: Not on file    Number of children: Not on file    Years of education: Not on file    Highest education level: Not on file   Occupational History    Not on file   Social Needs    Financial resource strain: Not on file    Food insecurity     Worry: Not on file     Inability: Not on file    Transportation needs     Medical: Not on file     Non-medical: Not on file   Tobacco Use    Smoking status: Current Every Day Smoker     Packs/day: 1.00     Years: 44.00     Pack years: 44.00     Types: Cigarettes     Start date: 1975    Smokeless tobacco: Never Used   Substance and Sexual Activity    Alcohol use: No    Drug use: No    Sexual activity: Not on file   Lifestyle    Physical activity     Days per week: Not on file     Minutes per session: Not on file    Stress: Not on file   Relationships    Social connections     Talks on phone: Not on file     Gets together: Not on file     Attends [employer]ism service: Not on file     Active member of club or organization: Not on file     Attends meetings of clubs or organizations: Not on file     Relationship status: Not on file    Intimate partner violence     Fear of current or ex partner: Not on file     Emotionally abused: Not on file     Physically abused: Not on file     Forced sexual activity: Not on file   Other Topics Concern    Not on file   Social History Narrative    Not on file     Allergies   Allergen Reactions    Aspirin Swelling    Penicillins Rash     Current Outpatient Medications   Medication Sig Dispense Refill    Exenatide (BYDUREON) 2 MG PEN Inject 1 pen into the skin once weekly 12 pen 1    insulin glargine (LANTUS SOLOSTAR) 100 UNIT/ML injection pen Inject 30 Units into the skin 2 times daily 15 pen 1    insulin lispro (HUMALOG KWIKPEN) 200 UNIT/ML SOPN pen 3 units for every 15 grams of CHOs; 65 - 70 units daily 15 pen 1    meloxicam (MOBIC) 15 MG tablet Take 1 tablet by mouth daily 90 tablet 3    metFORMIN (GLUCOPHAGE-XR) 500 MG extended release tablet Take 1 Sitting   Cuff Size: Medium Adult   Pulse: 70   Temp: 97.3 °F (36.3 °C)   TempSrc: Infrared   SpO2: 97%   Weight: 220 lb (99.8 kg)   Height: 5' 3\" (1.6 m)       Physical exam:  Physical Exam  Vitals signs reviewed. Constitutional:       General: She is not in acute distress. Appearance: She is well-developed. HENT:      Head: Normocephalic and atraumatic. Mouth/Throat:      Pharynx: No oropharyngeal exudate. Neck:      Musculoskeletal: Normal range of motion. Thyroid: No thyromegaly. Cardiovascular:      Rate and Rhythm: Normal rate and regular rhythm. Heart sounds: Normal heart sounds. No murmur. Pulmonary:      Effort: Pulmonary effort is normal. No respiratory distress. Breath sounds: Normal breath sounds. No wheezing. Abdominal:      General: There is no distension. Palpations: Abdomen is soft. Tenderness: There is no abdominal tenderness. There is no guarding or rebound. Lymphadenopathy:      Cervical: No cervical adenopathy. Skin:     General: Skin is warm and dry. Neurological:      Mental Status: She is alert and oriented to person, place, and time. Psychiatric:         Behavior: Behavior normal.         Assessment/Plan:  63 y.o. female here mainly for DM2:  - DM2: at goal; no changes  - Mood: replacing cymbalta with effexor for insurance reasons     Diagnosis Orders   1. Diabetes mellitus type 2 without retinopathy (HCC)  Exenatide (BYDUREON) 2 MG PEN    insulin glargine (LANTUS SOLOSTAR) 100 UNIT/ML injection pen    insulin lispro (HUMALOG KWIKPEN) 200 UNIT/ML SOPN pen    metFORMIN (GLUCOPHAGE-XR) 500 MG extended release tablet    POCT glycosylated hemoglobin (Hb A1C)   2. Chronic midline low back pain with left-sided sciatica  meloxicam (MOBIC) 15 MG tablet   3. Gastroesophageal reflux disease, esophagitis presence not specified  omeprazole (PRILOSEC) 40 MG delayed release capsule   4.  Other depression  OLANZapine (ZYPREXA) 20 MG tablet    venlafaxine (EFFEXOR XR) 75 MG extended release capsule   5. Pulmonary emphysema, unspecified emphysema type ([address]     6. Morbidly obese ([address]          Return in about 6 months (around 3/16/2021) for DM2.     Nikole Cook MD

## 2020-09-21 ENCOUNTER — HOSPITAL ENCOUNTER (OUTPATIENT)
Dept: PHYSICAL THERAPY | Age: 65
Setting detail: THERAPIES SERIES
Discharge: HOME | End: 2020-09-21
Payer: MEDICARE

## 2020-09-21 PROCEDURE — 97113 AQUATIC THERAPY/EXERCISES: CPT

## 2020-09-21 ASSESSMENT — PAIN SCALES - GENERAL: PAINLEVEL_OUTOF10: 2

## 2020-09-21 NOTE — PROGRESS NOTES
[address] [address]  Outpatient Physical Therapy    Treatment Note        Date: 2020  Patient: Xin Suarez  : 1955  ACCT #: [de-identified]  Referring Practitioner: Dr. Veronica Germain  Diagnosis: M54.5 S/p fusion L spine, Back pain    Visit Information:  PT Visit Information  Onset Date: (Dec 2019 back surgery)  PT Insurance Information: [facility] Medicare  Total # of Visits Approved: ($25 copay)  Total # of Visits to Date: 8  Plan of Care/Certification Expiration Date: 20  No Show: 0  Canceled Appointment: 0  Progress Note Counter:  (PN due 10/2/20)    Subjective: patient reports pain level has been 1-5/10 the last several days, notes pain level continues to be the worst when standing to cook dinner/ do dishes. Statesshe is unable to tolerate >10 minutes of standing before needing to sit and rest to relieve pain. Would like to continue therapy x2 weeks and then D/C do due leaving the state to take care of family.      HEP Compliance:  [x] Good [] Fair [] Poor [] Reports not doing due to:    Vital Signs  Patient Currently in Pain: Yes   Pain Screening  Patient Currently in Pain: Yes  Pain Assessment  Pain Assessment: 0-10  Pain Level: 2    OBJECTIVE:   Exercises  Exercise 4: Aquatics  Exercise 5: ambulation: forward, lateral, retro, marching x3 ea  Exercise 6: sink exercises with TA isometric x20 (squats on first step for improved ROM)  Exercise 7: hip circles x20 cw/ccw  Exercise 8: DB hang x5 mins  Exercise 9: DB bicycle x3 min  Exercise 10: hamstring stretch 3x30'' b/l  Exercise 11: DB hip abd/add x14; flex/ext x14  Exercise 12: KB push/pull x15  Exercise 13: KB [id number]  Strength: [] NT  [x] MMT completed:  Strength RLE  R Hip Flexion: 5/5  R Hip Extension: (N/A)  R Hip ABduction: (N/A)  R Hip Internal Rotation: 4+/5(patient c/o increased pain)  R Hip External Rotation: 4+/5(patient c/o increased pain)  R Knee Flexion: 4+/5  R Knee Extension: 4+/5  R Ankle Dorsiflexion: 5/5  Strength LLE  L Hip Flexion: 4+/5  L Hip Extension: (N/A)  L Hip ABduction: (N/A)  L Hip Internal Rotation: 5/5(c/o increased pain)  L Hip External Rotation: 4+/5(patient c/o increased pain)  L Knee Flexion: 5/5  L Knee Extension: 5/5  L Ankle Dorsiflexion: 5/5    *Indicates exercise, modality, or manual techniques to be initiated when appropriate    Assessment: Body structures, Functions, Activity limitations: Decreased ROM, Decreased strength, Decreased endurance, Increased pain  Assessment: continued progressions for bilateral LE strength and core stability. Patient requires cuing to improve postural awareness however tolerates exercises without reports of increased pain. Treatment Diagnosis: low back pain  Prognosis: Good  Goals:    Long term goals  Time Frame for Long term goals : 4 weeks  Long term goal 1: Patient will report </= 3/10 pain in low back with light housework. Long term goal 2: Patient will increase bilateral SLR >/= 70 degrees for improved functional tolerance. Long term goal 3: Patient will increase strength in bilateral LEs >/= 4+/5 for improved standing tolerance. Long term goal 4: Modified Oswestry </= 20/50 to demonstrate functional toleance. Long term goal 5: Patient will be independent with HEP. Progress toward goals: continue towards all     POST-PAIN       Pain Rating (0-10 pain scale):  3 /10   Location and pain description same as pre-treatment unless indicated. Action: [] NA   [] Perform HEP  [] Meds as prescribed  [x] Modalities as prescribed   [] Call Physician     Frequency/Duration:  Plan  Times per week: 2  Plan weeks: 4  Specific instructions for Next Treatment: provide HEP progressions  Current Treatment Recommendations: Strengthening, ROM, Endurance Training, Neuromuscular Re-education, Manual Therapy - Soft Tissue Mobilization, Home Exercise Program, Aquatics, Modalities  Plan Comment: patient would like to continue x2 weeks 1:1 land/pool     Pt to continue current HEP.   See

## 2020-09-21 NOTE — PROGRESS NOTES
[address]     Ph: [phone]  Fax: [phone]    [] Certification  [] Recertification []  Plan of Care  [x] Progress Note [] Discharge      To:  Referring Practitioner: Dr. Liliana Wilkerson      From: Zuly Combs PT   Patient: Kari Paz     : 1955  Diagnosis: M54.5 S/p fusion L spine, Back pain     Date: 2020  Treatment Diagnosis: low back pain    Plan of Care/Certification Expiration Date: 20  Progress Report Period from: 20 to 2020    Total # of Visits to Date: 8   No Show: 0    Canceled Appointment: 0     OBJECTIVE:   Long Term Goals - Time Frame for Long term goals : 4 weeks  Goals Current/ Discharge status Met   Long term goal 1: Patient will report </= 3/10 pain in low back with light housework. 1-5/10 [] yes  [x] no   Long term goal 2: Patient will increase bilateral SLR >/= 70 degrees for improved functional tolerance. Unable to assess this date  [] yes  [x] no   Long term goal 3: Patient will increase strength in bilateral LEs >/= 4+/5 for improved standing tolerance. Strength RLE  R Hip Flexion: 5/5  R Hip Extension: (N/A)  R Hip ABduction: (N/A)  R Hip Internal Rotation: 4+/5(patient c/o increased pain)  R Hip External Rotation: 4+/5(patient c/o increased pain)  R Knee Flexion: 4+/5  R Knee Extension: 4+/5  R Ankle Dorsiflexion: 5/5  Strength LLE  L Hip Flexion: 4+/5  L Hip Extension: (N/A)  L Hip ABduction: (N/A)  L Hip Internal Rotation: 5/5(c/o increased pain)  L Hip External Rotation:  4+/5(patient c/o increased pain)  L Knee Flexion: 5/5  L Knee Extension: 5/5  L Ankle Dorsiflexion: 5/5      [x] yes  [] no   Long term goal 4: Modified Oswestry </= 20/50 to demonstrate functional toleance.  [x] yes  [] no   Long term goal 5: Patient will be independent with HEP.  Patient Independent with current HEP, progressions ongoing   [x] yes  [] no        Body

## 2020-09-22 ENCOUNTER — TELEPHONE (OUTPATIENT)
Dept: FAMILY MEDICINE CLINIC | Age: 65
End: 2020-09-22

## 2020-09-22 NOTE — TELEPHONE ENCOUNTER
Scheduling outreach call to review Health Maintenance and / or schedule appointment.     AWV  due  Colonoscopy done  [facility] [address] GAP no  Lab work CMP-Lipid-  Mammogram done  PHQ-9 no due to diagnosis  Last appointment 9-16-20  Upcoming appointment YES 3-  Scanned and updated HM yes    Appointment note edited

## 2020-09-23 ENCOUNTER — HOSPITAL ENCOUNTER (OUTPATIENT)
Dept: PHYSICAL THERAPY | Age: 65
Setting detail: THERAPIES SERIES
Discharge: HOME | End: 2020-09-23
Payer: MEDICARE

## 2020-09-23 PROCEDURE — 97113 AQUATIC THERAPY/EXERCISES: CPT

## 2020-09-23 ASSESSMENT — PAIN DESCRIPTION - DESCRIPTORS: DESCRIPTORS: ACHING

## 2020-09-23 ASSESSMENT — PAIN DESCRIPTION - PAIN TYPE: TYPE: CHRONIC PAIN

## 2020-09-23 ASSESSMENT — PAIN DESCRIPTION - ORIENTATION: ORIENTATION: RIGHT;LOWER

## 2020-09-23 ASSESSMENT — PAIN SCALES - GENERAL: PAINLEVEL_OUTOF10: 3

## 2020-09-23 ASSESSMENT — PAIN DESCRIPTION - LOCATION: LOCATION: BACK

## 2020-09-23 NOTE — PROGRESS NOTES
[address] [address]  Outpatient Physical Therapy    Treatment Note        Date: 2020  Patient: Coutrney Choudhury  : 1955  ACCT #: [de-identified]  Referring Practitioner: Dr. Herminio Zuniga  Diagnosis: M54.5 S/p fusion L spine, Back pain    Visit Information:  PT Visit Information  Onset Date: (Dec 2019 back surgery)  PT Insurance Information: [facility] Medicare  Total # of Visits Approved: ($25 copay)  Total # of Visits to Date: 9  Plan of Care/Certification Expiration Date: 20  No Show: 0  Canceled Appointment: 0  Progress Note Counter:  (PN due 10/2/20)    Subjective: Pt stated shes been in ehr car alot today and has some pain in her R LB. HEP Compliance:  [x] Good [] Fair [] Poor [] Reports not doing due to:    Vital Signs  Patient Currently in Pain: Yes   Pain Screening  Patient Currently in Pain: Yes  Pain Assessment  Pain Assessment: 0-10  Pain Level: 3  Pain Type: Chronic pain  Pain Location: Back  Pain Orientation: Right; Lower  Pain Descriptors: Aching    OBJECTIVE:   Exercises  Exercise 4: Aquatics  Exercise 5: ambulation: forward, lateral, retro, marching x3 ea  Exercise 6: sink exercises with TA isometric x10 with ankle weights (squats on first step for improved ROM)  Exercise 7: hip circles x10 cw/ccw  Exercise 8: DB hang x3 mins  Exercise 9: DB bicycle x3 min  Exercise 10: hamstring stretch 3x30'' b/l  Exercise 11: DB hip abd/add x2min ; flex/ext x2 min  Exercise 20: HEP: sink ex's  Strength: [x] NT  [] MMT completed:    ROM: [x] NT  [] ROM measurements:    *Indicates exercise, modality, or manual techniques to be initiated when appropriate    Assessment: Body structures, Functions, Activity limitations: Decreased ROM, Decreased strength, Decreased endurance, Increased pain  Assessment: Performed ex's with ankle weights this date to further progress pt towards strength goal with good jl noted. Pt making good progress towards all goals. Progressed pt's HEP.   Treatment Diagnosis: low back pain  Prognosis: Good  Goals:  Long term goals  Time Frame for Long term goals : 4 weeks  Long term goal 1: Patient will report </= 3/10 pain in low back with light housework. Long term goal 2: Patient will increase bilateral SLR >/= 70 degrees for improved functional tolerance. Long term goal 3: Patient will increase strength in bilateral LEs >/= 4+/5 for improved standing tolerance. Long term goal 4: Modified Oswestry </= 20/50 to demonstrate functional toleance. Long term goal 5: Patient will be independent with HEP. Progress toward goals: incr strength and rom    POST-PAIN       Pain Rating (0-10 pain scale):  2 /10   Location and pain description same as pre-treatment unless indicated. Action: [] NA   [x] Perform HEP  [] Meds as prescribed  [] Modalities as prescribed   [] Call Physician     Frequency/Duration:  Plan  Times per week: 2  Plan weeks: 4  Current Treatment Recommendations: Strengthening, ROM, Endurance Training, Neuromuscular Re-education, Manual Therapy - Soft Tissue Mobilization, Home Exercise Program, Aquatics, Modalities  Plan Comment: patient would like to continue x2 weeks 1:1 land/pool     Pt to continue current HEP. See objective section for any therapeutic exercise changes, additions or modifications this date.   PT Individual Minutes  Time In: 1300  Time Out: 4648  Minutes: 38  Timed Code Treatment Minutes: 38 Minutes  Procedure Minutes:0     Timed Activity Minutes Units   Aq Ther Ex 38 3       Signature:  Electronically signed by Alex Benjamin PTA on 9/23/20 at 1:03 PM EDT

## 2020-09-28 ENCOUNTER — HOSPITAL ENCOUNTER (OUTPATIENT)
Dept: PHYSICAL THERAPY | Age: 65
Setting detail: THERAPIES SERIES
Discharge: HOME | End: 2020-09-28
Payer: MEDICARE

## 2020-09-28 PROCEDURE — 97113 AQUATIC THERAPY/EXERCISES: CPT

## 2020-09-28 ASSESSMENT — PAIN DESCRIPTION - LOCATION: LOCATION: BACK

## 2020-09-28 ASSESSMENT — PAIN DESCRIPTION - PAIN TYPE: TYPE: CHRONIC PAIN

## 2020-09-28 ASSESSMENT — PAIN SCALES - GENERAL: PAINLEVEL_OUTOF10: 3

## 2020-09-28 ASSESSMENT — PAIN DESCRIPTION - DESCRIPTORS: DESCRIPTORS: ACHING

## 2020-09-28 ASSESSMENT — PAIN DESCRIPTION - ORIENTATION: ORIENTATION: LOWER

## 2020-09-28 NOTE — PROGRESS NOTES
[address] [address]  Outpatient Physical Therapy    Treatment Note        Date: 2020  Patient: Neela Andrew  : 1955  ACCT #: [de-identified]  Referring Practitioner: Dr. Gina Garibay  Diagnosis: M54.5 S/p fusion L spine, Back pain    Visit Information:  PT Visit Information  Onset Date: (Dec 2019 back surgery)  PT Insurance Information: [facility] Medicare  Total # of Visits Approved: ($25 copay)  Total # of Visits to Date: 10  Plan of Care/Certification Expiration Date: 20  No Show: 0  Progress Note Due Date: 10/02/20  Canceled Appointment: 0  Progress Note Counter: 2 (PN due 10/2/20)    Subjective: Pt reports LB pain 5/10 with light household duties such as cooking. Decreased after sitting and resting. States had an Xray done last week by Dr Oleg Edge, revealing Herniated disc \"at level 6\"     HEP Compliance:  [x] Good [] Fair [] Poor [] Reports not doing due to:    Vital Signs  Patient Currently in Pain: Yes   Pain Screening  Patient Currently in Pain: Yes  Pain Assessment  Pain Level: 3  Pain Type: Chronic pain  Pain Location: Back  Pain Orientation: Lower  Pain Descriptors: Aching    OBJECTIVE:   Exercises  Exercise 4: Aquatics  Exercise 5: ambulation: forward, lateral, retro, marching x3 ea  Exercise 6: sink exercises with TA isometric x10 with ankle weights (squats on first step for improved ROM)  Exercise 7: hip circles x10 cw/ccw  Exercise 8: DB hang x3 mins  Exercise 9: DB bicycle x3 min  Exercise 10: hamstring stretch 3x30'' b/l  Exercise 11: DB hip abd/add x2min ; flex/ext x2 min  Exercise 14: Lat pull down with noodle x10  Exercise 20: HEP: sink ex's                *Indicates exercise, modality, or manual techniques to be initiated when appropriate    Assessment: Body structures, Functions, Activity limitations: Decreased ROM, Decreased strength, Decreased endurance, Increased pain  Assessment: Continued LE exercises with ankle weights to progress strength.  Initiated lat pulls down

## 2020-09-29 ENCOUNTER — OFFICE VISIT (OUTPATIENT)
Dept: FAMILY MEDICINE CLINIC | Age: 65
End: 2020-09-29
Payer: MEDICARE

## 2020-09-29 ENCOUNTER — TELEPHONE (OUTPATIENT)
Dept: FAMILY MEDICINE CLINIC | Age: 65
End: 2020-09-29

## 2020-09-29 VITALS
BODY MASS INDEX: 38.69 KG/M2 | SYSTOLIC BLOOD PRESSURE: 120 MMHG | DIASTOLIC BLOOD PRESSURE: 68 MMHG | OXYGEN SATURATION: 98 % | TEMPERATURE: 97.2 F | HEART RATE: 79 BPM | WEIGHT: 218.4 LBS

## 2020-09-29 PROBLEM — I70.0 CALCIFICATION OF ABDOMINAL AORTA (HCC): Status: ACTIVE | Noted: 2020-09-29

## 2020-09-29 PROCEDURE — G8417 CALC BMI ABV UP PARAM F/U: HCPCS | Performed by: FAMILY MEDICINE

## 2020-09-29 PROCEDURE — 99214 OFFICE O/P EST MOD 30 MIN: CPT | Performed by: FAMILY MEDICINE

## 2020-09-29 PROCEDURE — 4004F PT TOBACCO SCREEN RCVD TLK: CPT | Performed by: FAMILY MEDICINE

## 2020-09-29 PROCEDURE — 3017F COLORECTAL CA SCREEN DOC REV: CPT | Performed by: FAMILY MEDICINE

## 2020-09-29 PROCEDURE — 2022F DILAT RTA XM EVC RTNOPTHY: CPT | Performed by: FAMILY MEDICINE

## 2020-09-29 PROCEDURE — 3044F HG A1C LEVEL LT 7.0%: CPT | Performed by: FAMILY MEDICINE

## 2020-09-29 PROCEDURE — G8427 DOCREV CUR MEDS BY ELIG CLIN: HCPCS | Performed by: FAMILY MEDICINE

## 2020-09-29 RX ORDER — GLUCOSAMINE HCL/CHONDROITIN SU 500-400 MG
CAPSULE ORAL
Qty: 200 STRIP | Refills: 11 | Status: SHIPPED | OUTPATIENT
Start: 2020-09-29 | End: 2020-12-23

## 2020-09-29 RX ORDER — SIMVASTATIN 40 MG
40 TABLET ORAL NIGHTLY
Qty: 90 TABLET | Refills: 1 | Status: SHIPPED | OUTPATIENT
Start: 2020-09-29 | End: 2021-03-09

## 2020-09-29 RX ORDER — LISINOPRIL 10 MG/1
10 TABLET ORAL NIGHTLY
Qty: 90 TABLET | Refills: 1 | Status: SHIPPED | OUTPATIENT
Start: 2020-09-29 | End: 2021-03-09

## 2020-09-29 RX ORDER — CLOPIDOGREL BISULFATE 75 MG/1
75 TABLET ORAL NIGHTLY
Qty: 90 TABLET | Refills: 1 | Status: SHIPPED | OUTPATIENT
Start: 2020-09-29 | End: 2021-03-09

## 2020-09-29 RX ORDER — LANCETS 30 GAUGE
1 EACH MISCELLANEOUS
Qty: 200 EACH | Refills: 11 | Status: SHIPPED | OUTPATIENT
Start: 2020-09-29 | End: 2020-10-28 | Stop reason: SDUPTHER

## 2020-09-29 ASSESSMENT — ENCOUNTER SYMPTOMS
CONSTIPATION: 0
RHINORRHEA: 0
SORE THROAT: 0
ABDOMINAL PAIN: 0
SHORTNESS OF BREATH: 0
WHEEZING: 0
DIARRHEA: 0
COUGH: 0

## 2020-09-29 NOTE — TELEPHONE ENCOUNTER
requesting medication refill.  Please approve or deny this request.    Rx requested:  Requested Prescriptions     Pending Prescriptions Disp Refills    clopidogrel (PLAVIX) 75 MG tablet 90 tablet 1     Sig: Take 1 tablet by mouth nightly    lisinopril (PRINIVIL;ZESTRIL) 10 MG tablet 90 tablet 1     Sig: Take 1 tablet by mouth nightly    simvastatin (ZOCOR) 40 MG tablet 90 tablet 1     Sig: Take 1 tablet by mouth nightly         Last Office Visit:   1/28/2020      Next Visit Date:  Future Appointments   Date Time Provider Tran Coleen   9/30/2020  1:40 PM Deniz Davies, [facility] PT [facility]   10/5/2020  1:00 PM Kathy Gupta, PTA [address]   10/7/2020  1:00 PM [place] [facility] RAD   1/12/2021 11:15 AM Nakita Serna MD [address]   3/16/2021 10:00 AM Kim Ybarra MD [facility]

## 2020-09-29 NOTE — TELEPHONE ENCOUNTER
JAVIER: Our mutual patient was sent to me by her pain clinic for an incidental finding of abdominal aortic calcifications on recent spine xray. We discussed it briefly and I asked her to follow up with you. I thinks she is due for a routine visit with you anyway for the POTS and HTN.

## 2020-09-29 NOTE — PROGRESS NOTES
[address] PRIMARY CARE  [address] [place] [address]  Dept: [phone]  Dept Fax: : [phone]   Chief Complaint  Chief Complaint   Patient presents with    Results     discuss xray lumbar spine     Medication Refill     lacents and test strips        HPI:  63 y. o.female who presents for abnormal imagine result:    Sees pain clinic with Dr. Akanksha Jean-Baptiste. Recent xray was abnormal 8/2020 and told to f/u with PCP; finding showed incidental abd aortic calcifications. She has no new symptoms and currently established with cardiology.      Past Medical History:   Diagnosis Date    Arthritis     Cerebral artery occlusion with cerebral infarction [facility]) 2016 June    TIA / sx left sided weakness & fell & unable to get up off floor for several hours    COPD (chronic obstructive pulmonary disease) (HCC)     smoking habit > 40 yrs    Depression     Headache     Hyperlipidemia     meds > 10 yrs    Hypertension     meds since TIA / 6/2016    Neuropathy     POTS (postural orthostatic tachycardia syndrome)     2000's    Restless legs syndrome     Type 2 diabetes mellitus without complication (HCC)     hx > 7 yrs     Past Surgical History:   Procedure Laterality Date    ABDOMINAL EXPLORATION SURGERY  1970s    due to abdominal pain / no definite dx    BACK SURGERY  12/24/2018    lumbar disc OR at [facility]    due to tendonitis    ENDOMETRIAL ABLATION  1990s    AUB    INSERT/ REPLACE INFUSN PUMP,PROGRAMMABLE N/A 5/29/2019    REMOVAL OF INTRATHECAL PUMP RESERVOIR performed by Austin Olivares MD at [facility]      pain pump placement 2010 & replacement 2016    TONSILLECTOMY      age 24     Social History     Socioeconomic History    Marital status:      Spouse name: Not on file    Number of children: Not on file    Years of education: Not on file   Cheryl Love Highest education level: Not on file   Occupational History    Not on file   Social Needs    Financial resource strain: Not on file    Food insecurity     Worry: Not on file     Inability: Not on file    Transportation needs     Medical: Not on file     Non-medical: Not on file   Tobacco Use    Smoking status: Current Every Day Smoker     Packs/day: 1.00     Years: 44.00     Pack years: 44.00     Types: Cigarettes     Start date: 67    Smokeless tobacco: Never Used   Substance and Sexual Activity    Alcohol use: No    Drug use: No    Sexual activity: Not on file   Lifestyle    Physical activity     Days per week: Not on file     Minutes per session: Not on file    Stress: Not on file   Relationships    Social connections     Talks on phone: Not on file     Gets together: Not on file     Attends [employer] service: Not on file     Active member of club or organization: Not on file     Attends meetings of clubs or organizations: Not on file     Relationship status: Not on file    Intimate partner violence     Fear of current or ex partner: Not on file     Emotionally abused: Not on file     Physically abused: Not on file     Forced sexual activity: Not on file   Other Topics Concern    Not on file   Social History Narrative    Not on file     Allergies   Allergen Reactions    Aspirin Swelling    Penicillins Rash     Current Outpatient Medications   Medication Sig Dispense Refill    blood glucose monitor strips Test 5 times daily 200 strip 11    Lancets MISC 1 each by Does not apply route 5 times daily 200 each 11    Exenatide (BYDUREON) 2 MG PEN Inject 1 pen into the skin once weekly 12 pen 1    insulin glargine (LANTUS SOLOSTAR) 100 UNIT/ML injection pen Inject 30 Units into the skin 2 times daily 15 pen 1    insulin lispro (HUMALOG KWIKPEN) 200 UNIT/ML SOPN pen 3 units for every 15 grams of CHOs; 65 - 70 units daily 15 pen 1    meloxicam (MOBIC) 15 MG tablet Take 1 tablet by mouth daily 90 tablet 3    metFORMIN (GLUCOPHAGE-XR) 500 MG extended release tablet Take 1 tablet by mouth 2 times daily 180 tablet 1    OLANZapine (ZYPREXA) 20 MG tablet Take 1 tablet by mouth nightly 90 tablet 3    omeprazole (PRILOSEC) 40 MG delayed release capsule Take 1 capsule by mouth daily 90 capsule 3    venlafaxine (EFFEXOR XR) 75 MG extended release capsule Take 1 capsule by mouth daily 90 capsule 3    albuterol-ipratropium (COMBIVENT RESPIMAT)  MCG/ACT AERS inhaler Inhale 1 puff into the lungs every 4 hours as needed for Wheezing or Shortness of Breath 3 Inhaler 3    tiZANidine (ZANAFLEX) 4 MG tablet Take 4 mg by mouth nightly  0    nystatin-triamcinolone (MYCOLOG II) 832765-0.1 UNIT/GM-% cream Apply topically 2 times daily. 60 g 1    acetaminophen-codeine (TYLENOL/CODEINE #4) 300-60 MG per tablet Take 1 tablet by mouth 3 times daily as needed. Monica Fernandez UNABLE TO FIND Fentanyl 1,000 mg/day and Bupivicaine 30 mg/day via implanted pump      clopidogrel (PLAVIX) 75 MG tablet Take 1 tablet by mouth nightly 90 tablet 1    lisinopril (PRINIVIL;ZESTRIL) 10 MG tablet Take 1 tablet by mouth nightly 90 tablet 1    simvastatin (ZOCOR) 40 MG tablet Take 1 tablet by mouth nightly 90 tablet 1    DULoxetine (CYMBALTA) 30 MG extended release capsule Take 3 capsules by mouth daily 90 capsule 3     No current facility-administered medications for this visit. ROS:  Review of Systems   Constitutional: Negative for chills and fever. HENT: Negative for rhinorrhea and sore throat. Respiratory: Negative for cough, shortness of breath and wheezing. Gastrointestinal: Negative for abdominal pain, constipation and diarrhea. Endocrine: Negative for polydipsia and polyuria. Genitourinary: Negative for dysuria, frequency and urgency. Neurological: Negative for syncope, light-headedness, numbness and headaches. Psychiatric/Behavioral: Negative for sleep disturbance. The patient is not nervous/anxious. Vitals:    09/29/20 1459   BP: 120/68   Site: Right Upper Arm   Position: Sitting   Cuff Size: Large Adult   Pulse: 79   Temp: 97.2 °F (36.2 °C)   TempSrc: Infrared   SpO2: 98%   Weight: 218 lb 6.4 oz (99.1 kg)       Physical exam:  Physical Exam  Vitals signs reviewed. Constitutional:       General: She is not in acute distress. Appearance: She is well-developed. HENT:      Head: Normocephalic and atraumatic. Mouth/Throat:      Pharynx: No oropharyngeal exudate. Neck:      Musculoskeletal: Normal range of motion. Thyroid: No thyromegaly. Cardiovascular:      Rate and Rhythm: Normal rate and regular rhythm. Heart sounds: Normal heart sounds. No murmur. Pulmonary:      Effort: Pulmonary effort is normal. No respiratory distress. Breath sounds: Normal breath sounds. No wheezing. Abdominal:      General: There is no distension. Palpations: Abdomen is soft. Tenderness: There is no abdominal tenderness. There is no guarding or rebound. Lymphadenopathy:      Cervical: No cervical adenopathy. Skin:     General: Skin is warm and dry. Neurological:      Mental Status: She is alert and oriented to person, place, and time. Psychiatric:         Behavior: Behavior normal.         Assessment/Plan:  63 y.o. female here mainly for aortic calcification:  - discussed incidental findings and possible implications for heart disease; she will f/u with her cardiologist     Diagnosis Orders   1. Calcification of abdominal aorta (HCC)     2. Diabetes mellitus type 2 without retinopathy ([address]  blood glucose monitor strips    Lancets MISC        Return if symptoms worsen or fail to improve.     Zee Mena MD

## 2020-09-30 ENCOUNTER — HOSPITAL ENCOUNTER (OUTPATIENT)
Dept: PHYSICAL THERAPY | Age: 65
Setting detail: THERAPIES SERIES
Discharge: HOME | End: 2020-09-30
Payer: MEDICARE

## 2020-09-30 PROCEDURE — 97112 NEUROMUSCULAR REEDUCATION: CPT

## 2020-09-30 PROCEDURE — 97140 MANUAL THERAPY 1/> REGIONS: CPT

## 2020-09-30 PROCEDURE — 97110 THERAPEUTIC EXERCISES: CPT

## 2020-09-30 ASSESSMENT — PAIN DESCRIPTION - ORIENTATION: ORIENTATION: LOWER

## 2020-09-30 ASSESSMENT — PAIN DESCRIPTION - DESCRIPTORS: DESCRIPTORS: ACHING

## 2020-09-30 ASSESSMENT — PAIN SCALES - GENERAL: PAINLEVEL_OUTOF10: 5

## 2020-09-30 ASSESSMENT — PAIN DESCRIPTION - LOCATION: LOCATION: BACK;BUTTOCKS

## 2020-09-30 NOTE — PROGRESS NOTES
pain)  L Knee Flexion: 5/5  L Knee Extension: 5/5  L Ankle Dorsiflexion: 5/5     ROM: [x] NT  [] ROM measurements:      Manual:   Manual therapy  Other: mmt/goal assessment: 15 min        *Indicates exercise, modality, or manual techniques to be initiated when appropriate    Assessment: Body structures, Functions, Activity limitations: Decreased ROM, Decreased strength, Decreased endurance, Increased pain  Assessment: Pt progressing well towards goals. Pt finishing POC next visit and was educated on a progressive landbased HEP this date with therabands provided. Pt with verbal and physical  understanding of landbased progession. Treatment Diagnosis: low back pain  Prognosis: Good       Goals:       Long term goals  Time Frame for Long term goals : 4 weeks  Long term goal 1: Patient will report </= 3/10 pain in low back with light housework. Long term goal 2: Patient will increase bilateral SLR >/= 70 degrees for improved functional tolerance. Long term goal 3: Patient will increase strength in bilateral LEs >/= 4+/5 for improved standing tolerance. Long term goal 4: Modified Oswestry </= 20/50 to demonstrate functional toleance. Long term goal 5: Patient will be independent with HEP. Progress toward goals:ongoing, working on ROM and strengthening to improve overall stability. 25/50 Mod Oswestry    POST-PAIN       Pain Rating (0-10 pain scale):   3/10   Location and pain description same as pre-treatment unless indicated. Action: [x] NA   [] Perform HEP  [] Meds as prescribed  [] Modalities as prescribed   [] Call Physician     Frequency/Duration:  Plan  Times per week: 2  Plan weeks: 4  Current Treatment Recommendations: Strengthening, ROM, Endurance Training, Neuromuscular Re-education, Manual Therapy - Soft Tissue Mobilization, Home Exercise Program, Aquatics, Modalities  Plan Comment: consider DC next visit. Pt to continue current HEP.   See objective section for any therapeutic exercise changes, additions or modifications this date.     PT Individual Minutes  Time In: 1350  Time Out: 7697  Minutes: 41  Timed Code Treatment Minutes: 40 Minutes     Timed Activity Minutes Units   Ther Ex 15 1   Neuro re ed  10 1   Manual  15 1       Signature:  Electronically signed by Aleyda Smith, PT on 9/30/20 at 2:31 PM EDT

## 2020-10-05 ENCOUNTER — HOSPITAL ENCOUNTER (OUTPATIENT)
Dept: PHYSICAL THERAPY | Age: 65
Setting detail: THERAPIES SERIES
Discharge: HOME | End: 2020-10-05
Payer: MEDICARE

## 2020-10-05 PROCEDURE — 97113 AQUATIC THERAPY/EXERCISES: CPT

## 2020-10-05 ASSESSMENT — PAIN DESCRIPTION - LOCATION: LOCATION: BACK;BUTTOCKS

## 2020-10-05 ASSESSMENT — PAIN SCALES - GENERAL: PAINLEVEL_OUTOF10: 3

## 2020-10-05 ASSESSMENT — PAIN DESCRIPTION - DESCRIPTORS: DESCRIPTORS: ACHING

## 2020-10-05 ASSESSMENT — PAIN DESCRIPTION - PAIN TYPE: TYPE: CHRONIC PAIN

## 2020-10-05 ASSESSMENT — PAIN DESCRIPTION - ORIENTATION: ORIENTATION: LOWER

## 2020-10-05 NOTE — DISCHARGE SUMMARY
[address]     Ph: [phone]  Fax: [phone]    [] Certification  [] Recertification []  Plan of Care  [] Progress Note [x] Discharge      To:  Dr. Mark Nichole      From:  Romero Gonzales, PT  Patient: Chuy Olivera     : 1955  Diagnosis: M54.5 S/p fusion L spine, Back pain     Date: 10/5/2020  Treatment Diagnosis: low back pain    Plan of Care/Certification Expiration Date: 20  Progress Report Period from:  2020  to 10/5/2020    Total # of Visits to Date: 12   No Show: 0    Canceled Appointment: 0     OBJECTIVE:   Long Term Goals - Time Frame for Long term goals : 4 weeks  Goals Current/ Discharge status Met   Long term goal 1: Patient will report </= 3/10 pain in low back with light housework. Pt reports pain ranges 0-5/10 w/ light housework [x] yes  [x] no   Long term goal 2: Patient will increase bilateral SLR >/= 70 degrees for improved functional tolerance. PROM RLE (degrees)  RLE General PROM: SLR: 65 deg     PROM LLE (degrees)  LLE General PROM: SLR: 70 deg [x] yes  [x] no   Long term goal 3: Patient will increase strength in bilateral LEs >/= 4+/5 for improved standing tolerance. Strength RLE  R Hip Flexion: 5/5  R Hip Extension: 4+/5(tested standing)  R Hip ABduction: 4+/5(tested standing)  R Hip Internal Rotation: 4+/5, 5/5(inc pain)  R Hip External Rotation: 4+/5, 5/5(inc pain)  R Knee Flexion: 4+/5  R Knee Extension: 5/5  R Ankle Dorsiflexion: 5/5  Strength LLE  L Hip Flexion: 4+/5  L Hip Extension: 4+/5(tested standing)  L Hip ABduction: 4+/5(tested standing)  L Hip Internal Rotation: 5/5(inc pain)  L Hip External Rotation: 4+/5(inc pain)  L Knee Flexion: 5/5  L Knee Extension: 5/5  L Ankle Dorsiflexion: 5/5   [x] yes  [] no   Long term goal 4: Modified Oswestry </= 20/50 to demonstrate functional toleance.  HIWOT 25/50 [] yes  [x] no   Long term goal 5: Patient will be independent with HEP. Pt indep w/ HEP [x] yes  [] no         Body structures, Functions, Activity limitations: Decreased ROM, Decreased strength, Decreased endurance, Increased pain  Assessment: Pt justine's good progress towards goals meeting strength and part of ROM goal. Pt states that overall she has had improvement in functional mobility and pain since beginning PT. Pt is now indep w/ land and aquatic based HEP to self-manage symptoms. Prognosis: Good    PLAN: [x]   Frequency/Duration:  Plan  Plan Comment: D/C                       Patient Status:[] Continue/ Initiate plan of Care    [x] Discharge PT. Recommend pt continue with HEP. [] Additional visits requested, Please re-certify for additional visits:          Signature: Objective Measures Obtained By: Electronically signed by Airam Johnson PTA on 10/5/20 at 12:41 PM EDT  Electronically signed by Kamari Lugo PT on 10/5/2020 at 3:34 PM      If you have any questions or concerns, please don't hesitate to call. Thank you for your referral.    I have reviewed this plan of care and certify a need for medically necessary rehabilitation services.     Physician Signature:__________________________________________________________  Date:  Please sign and return

## 2020-10-05 NOTE — PROGRESS NOTES
[address] [address]  Outpatient Physical Therapy    Treatment Note        Date: 10/5/2020  Patient: Loreto Mcmanus  : 1955  ACCT #: [de-identified]  Referring Practitioner: Dr. Solorio Care  Diagnosis: M54.5 S/p fusion L spine, Back pain    Visit Information:  PT Visit Information  Onset Date: (Dec 2019 back surgery)  PT Insurance Information: [facility] Medicare  Total # of Visits Approved: ($25 copay)  Total # of Visits to Date: 12  Plan of Care/Certification Expiration Date: 20  No Show: 0  Canceled Appointment: 0  Progress Note Counter:  (PN due 10/23/20)    Subjective: Pt reports 3/10 pain currently. Pt states the pain typically ranges 0-5/10.      HEP Compliance:  [x] Good [] Fair [] Poor [] Reports not doing due to:    Vital Signs  Patient Currently in Pain: Yes   Pain Screening  Patient Currently in Pain: Yes  Pain Assessment  Pain Assessment: 0-10  Pain Level: 3  Pain Type: Chronic pain  Pain Location: Back;Buttocks  Pain Orientation: Lower  Pain Descriptors: Aching    OBJECTIVE:   Exercises  Exercise 4: Aquatics  Exercise 5: ambulation: forward, lateral, retro, marching x3 ea  Exercise 6: sink exercises with TA isometric x10 with ankle weights (squats x20 on first step for improved ROM)  Exercise 7: hip circles x10 cw/ccw w/ ankle wts  Exercise 8: DB hang x3 mins  Exercise 9: DB bicycle x3 min  Exercise 10: hamstring stretch 3x30'' b/l  Exercise 11: DB hip abd/add x2min ; flex/ext x2 min  Exercise 14: Lat pull down with noodle x10  Exercise 20: HEP: sink ex's    Strength: [] NT  [x] MMT completed:  Strength RLE  R Hip Flexion: 5/5  R Hip Extension: 4+/5(tested standing)  R Hip ABduction: 4+/5(tested standing)  R Hip Internal Rotation: 4+/5, 5/5(inc pain)  R Hip External Rotation: 4+/5, 5/5(inc pain)  R Knee Flexion: 4+/5  R Knee Extension: 5/5  R Ankle Dorsiflexion: 5/5  Strength LLE  L Hip Flexion: 4+/5  L Hip Extension: 4+/5(tested standing)  L Hip ABduction: 4+/5(tested standing)  L Hip Units   AQ Ther Ex 38 3       Signature:  Electronically signed by Kip Almaraz PTA on 10/5/20 at 12:37 PM EDT

## 2020-10-07 ENCOUNTER — HOSPITAL ENCOUNTER (OUTPATIENT)
Dept: ULTRASOUND IMAGING | Age: 65
Discharge: HOME | End: 2020-10-09
Payer: MEDICARE

## 2020-10-07 PROCEDURE — 93923 UPR/LXTR ART STDY 3+ LVLS: CPT

## 2020-10-07 PROCEDURE — 93923 UPR/LXTR ART STDY 3+ LVLS: CPT | Performed by: INTERNAL MEDICINE

## 2020-10-28 ENCOUNTER — TELEPHONE (OUTPATIENT)
Dept: INTERNAL MEDICINE | Age: 65
End: 2020-10-28

## 2020-10-28 RX ORDER — LANCETS 30 GAUGE
1 EACH MISCELLANEOUS
Qty: 200 EACH | Refills: 11 | Status: SHIPPED | OUTPATIENT
Start: 2020-10-28 | End: 2020-12-23

## 2020-11-05 ENCOUNTER — TELEPHONE (OUTPATIENT)
Dept: FAMILY MEDICINE CLINIC | Age: 65
End: 2020-11-05

## 2020-11-05 RX ORDER — SYRING-NEEDL,DISP,INSUL,0.3 ML 30 GX5/16"
SYRINGE, EMPTY DISPOSABLE MISCELLANEOUS
Qty: 1 EACH | Refills: 0 | Status: SHIPPED | OUTPATIENT
Start: 2020-11-05 | End: 2020-12-23

## 2020-11-05 RX ORDER — GLUCOSAMINE HCL/CHONDROITIN SU 500-400 MG
CAPSULE ORAL
Qty: 100 STRIP | Refills: 11 | Status: SHIPPED | OUTPATIENT
Start: 2020-11-05 | End: 2020-12-23

## 2020-11-05 RX ORDER — LANCETS 30 GAUGE
1 EACH MISCELLANEOUS 3 TIMES DAILY
Qty: 100 EACH | Refills: 11 | Status: SHIPPED | OUTPATIENT
Start: 2020-11-05 | End: 2020-12-23

## 2020-11-05 NOTE — TELEPHONE ENCOUNTER
Patient called and stated that [facility] has been trying to get an order for her. Patient states that due to a change in insurance she needs to get a new glucometer, test strips and lancets. Patient uses the Ultra One Touch Mini.

## 2020-11-24 NOTE — TELEPHONE ENCOUNTER
Requesting medication refill. Please approve or deny this request.    Rx requested:  Requested Prescriptions     Pending Prescriptions Disp Refills    insulin lispro (HUMALOG KWIKPEN) 200 UNIT/ML SOPN pen [Pharmacy Med Name: Mary Liu 200U/ML 2X3ML SOLUTION] 36 mL 3     Sig: INJECT SUBCUTANEOUSLY 3  UNITS FOR EVERY 15 GRAMS OF CARBOHYDRATES; 65-70 UNITS  DAILY    LANTUS SOLOSTAR 100 UNIT/ML injection pen [Pharmacy Med Name: LANTUS SOLOSTAR 5X3 ML SOLUTION] 60 mL 3     Sig: INJECT SUBCUTANEOUSLY [place] Office Visit, reason seen and by who:   9/29/2020  [facility]    FOLLOW UP PLAN FROM LAST VISIT: COPY AND PASTE FROM LAST NOTE     Return if symptoms worsen or fail to improve.          PATIENT CONTACTED FOR A FOLLOW UP APPT: YES OR NO  No   See below    Next Visit Date:  Future Appointments   Date Time Provider Tran Horne   1/12/2021 11:15 AM Johnny Norton MD [address]   3/16/2021 10:00 AM Jonathan Martinez MD [facility]

## 2020-11-25 RX ORDER — INSULIN LISPRO 200 [IU]/ML
INJECTION, SOLUTION SUBCUTANEOUS
Qty: 36 ML | Refills: 3 | Status: SHIPPED | OUTPATIENT
Start: 2020-11-25

## 2020-11-25 RX ORDER — INSULIN GLARGINE 100 [IU]/ML
INJECTION, SOLUTION SUBCUTANEOUS
Qty: 60 ML | Refills: 3 | Status: SHIPPED | OUTPATIENT
Start: 2020-11-25 | End: 2021-12-06

## 2020-12-23 ENCOUNTER — TELEPHONE (OUTPATIENT)
Dept: FAMILY MEDICINE CLINIC | Age: 65
End: 2020-12-23

## 2020-12-23 RX ORDER — SYRING-NEEDL,DISP,INSUL,0.3 ML 30 GX5/16"
1 SYRINGE, EMPTY DISPOSABLE MISCELLANEOUS
Qty: 1 EACH | Refills: 0 | Status: SHIPPED | OUTPATIENT
Start: 2020-12-23

## 2020-12-23 RX ORDER — GLUCOSAMINE HCL/CHONDROITIN SU 500-400 MG
CAPSULE ORAL
Qty: 100 STRIP | Refills: 11 | Status: SHIPPED | OUTPATIENT
Start: 2020-12-23 | End: 2021-11-05 | Stop reason: SDUPTHER

## 2020-12-23 RX ORDER — LANCETS 30 GAUGE
1 EACH MISCELLANEOUS 3 TIMES DAILY
Qty: 100 EACH | Refills: 11 | Status: SHIPPED | OUTPATIENT
Start: 2020-12-23 | End: 2021-11-05 | Stop reason: SDUPTHER

## 2020-12-23 NOTE — TELEPHONE ENCOUNTER
Pt states she is not receiving her diabetic supplies from [employer] or [employer] ([employer] She would like for you to send one touch ultra test strips and delicate plus lancets to [facility] in [facility].

## 2021-01-12 ENCOUNTER — OFFICE VISIT (OUTPATIENT)
Dept: CARDIOLOGY CLINIC | Age: 66
End: 2021-01-12
Payer: MEDICARE

## 2021-01-12 VITALS
HEART RATE: 89 BPM | WEIGHT: 217.6 LBS | HEIGHT: 63 IN | DIASTOLIC BLOOD PRESSURE: 82 MMHG | SYSTOLIC BLOOD PRESSURE: 122 MMHG | BODY MASS INDEX: 38.55 KG/M2 | RESPIRATION RATE: 22 BRPM | OXYGEN SATURATION: 98 %

## 2021-01-12 DIAGNOSIS — I10 ESSENTIAL HYPERTENSION: ICD-10-CM

## 2021-01-12 DIAGNOSIS — G90.A POTS (POSTURAL ORTHOSTATIC TACHYCARDIA SYNDROME): Primary | ICD-10-CM

## 2021-01-12 PROCEDURE — G8484 FLU IMMUNIZE NO ADMIN: HCPCS | Performed by: INTERNAL MEDICINE

## 2021-01-12 PROCEDURE — G8417 CALC BMI ABV UP PARAM F/U: HCPCS | Performed by: INTERNAL MEDICINE

## 2021-01-12 PROCEDURE — 99214 OFFICE O/P EST MOD 30 MIN: CPT | Performed by: INTERNAL MEDICINE

## 2021-01-12 PROCEDURE — 3017F COLORECTAL CA SCREEN DOC REV: CPT | Performed by: INTERNAL MEDICINE

## 2021-01-12 PROCEDURE — G8427 DOCREV CUR MEDS BY ELIG CLIN: HCPCS | Performed by: INTERNAL MEDICINE

## 2021-01-12 PROCEDURE — 4004F PT TOBACCO SCREEN RCVD TLK: CPT | Performed by: INTERNAL MEDICINE

## 2021-01-12 ASSESSMENT — ENCOUNTER SYMPTOMS
TROUBLE SWALLOWING: 0
BLOOD IN STOOL: 0
COLOR CHANGE: 0
VOMITING: 0
FACIAL SWELLING: 0
DIARRHEA: 0
NAUSEA: 0
SHORTNESS OF BREATH: 0
WHEEZING: 0
APNEA: 0
VOICE CHANGE: 0
ANAL BLEEDING: 0
CHEST TIGHTNESS: 0
ABDOMINAL DISTENTION: 0

## 2021-01-12 NOTE — PROGRESS NOTES
[facility] CARDIOLOGY OFFICE FOLLOW-UP      Patient: Nadiya Benavides  YOB: 1955  MRN: [id number]    Chief Complaint:  Chief Complaint   Patient presents with    1 Year Follow Up     POTS    Hypertension         Subjective/HPI:  1/12/21: Patient presents today for follow-up of POTS. No  syncope. Recent work-up by Alisa Aguilar showed calcification of aorta. No aneurysm. She is already on statins and Plavix. Diabetic. No angina congestive heart failure. She does not work he does not smoke currently. She used to be a hairdresser in the past.  She will see me in 6 months       1/28/2020: Patient presents today for follow-up of pots. She is diabetic hypertensive. And allergic to aspirin. He is on Mobic 1 tablet daily. Moderately obese has dyslipidemia. Was reportedly told she had a TIA. Also has vasovagal episodes. They have not happened in a long time. She will see me in 6 months     7/24/19: Patient presents today for follow-up of POTS.  Retired mutation.  No chest pain.  Had a stress test 2 years ago that was negative.  Also diabetic.  Moderately obese.  Unfortunately continues to smoke. Katerine Kendra will see me in 6 months.     1/23/19: Patient presents today for follow-up of POTS. Retired beautician. Spinal surgery by Dr. Niall Stephens went fine. No significant post op issues. She was told she had a CVA for which she is on Plavix. And is back on Plavix. Moderately obese and has GERD that stable. See me in 6 months.     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 Needs a spinal surgery. Had a stress test by Yue Sanders which was reportedly normal. Reportedly had CVA for which she is on Plavix. Because of the surgery will hold the Plavix for a week before the procedure and resume it as soon as possible. Otherwise acceptable risk for surgery.     7/25/18: Patient presents today for follow-up of POTS. Retired beautician.  He has seen Yue Sanders in the past. Reportedly had a stroke. She is on Plavix. Has not been able to get disability. Diabetic. And dyslipidemia. She also has GERD. These other issues are stable. See me in 6 months.     4/24/18: Patient presents today for Evaluation of pots. Moderately obese retired beautician. Last episode of syncope was 2 years ago. She had what appears to be a TIA-like symptoms. About a year ago. With weakness on one side of the body. She then went to the ER at [facility]. Rare she had extensive workup by Nuria Coronado was told she might have a TIA. She was started on Plavix. Has not had any spells since then. She still smokes. About a pack per day. He is diabetic. No definite angina congestive heart failure. She has dyslipidemia. She is on excellent medications. Height is for coronary artery disease but already had extensive workup including echo stress test carotid ultrasound. Carotids showed less than 30% stenosis bilaterally. I'll see her in 3 months. Continue same medications.  No angina congestive heart failure.         Past Medical History:   Diagnosis Date    Arthritis     Cerebral artery occlusion with cerebral infarction [facility]) 2016 June    TIA / sx left sided weakness & fell & unable to get up off floor for several hours    COPD (chronic obstructive pulmonary disease) ([address]     smoking habit > 40 yrs    Depression     Headache     Hyperlipidemia     meds > 10 yrs    Hypertension     meds since TIA / 6/2016    Neuropathy     POTS (postural orthostatic tachycardia syndrome)     2000's    Restless legs syndrome     Type 2 diabetes mellitus without complication (HCC)     hx > 7 yrs       Past Surgical History:   Procedure Laterality Date    ABDOMINAL EXPLORATION SURGERY  1970s    due to abdominal pain / no definite dx    BACK SURGERY  12/24/2018    lumbar disc OR at [facility] Right 1989    due to tendonitis    ENDOMETRIAL ABLATION  1990s    AUB    INSERT/ REPLACE INFUSN PUMP,PROGRAMMABLE N/A 5/29/2019    REMOVAL OF INTRATHECAL PUMP RESERVOIR performed by Ana Maria Sewell MD at [address]      pain pump placement 2010 & replacement 2016    TONSILLECTOMY      age 24       Family History   Problem Relation Age of Onset    Stroke Mother     High Blood Pressure Mother     High Cholesterol Mother     Diabetes Father         Pre    Heart Attack Father     Cancer Maternal Grandmother         colon    Cancer Maternal Grandfather     Alzheimer's Disease Paternal Grandmother     Alzheimer's Disease Paternal Grandfather     Diabetes Paternal Grandfather     COPD Sister        Social History     Socioeconomic History    Marital status:      Spouse name: None    Number of children: None    Years of education: None    Highest education level: None   Occupational History    None   Social Needs    Financial resource strain: None    Food insecurity     Worry: None     Inability: None    Transportation needs     Medical: None     Non-medical: None   Tobacco Use    Smoking status: Current Every Day Smoker     Packs/day: 1.00     Years: 44.00     Pack years: 44.00     Types: Cigarettes     Start date: 1975    Smokeless tobacco: Never Used   Substance and Sexual Activity    Alcohol use: No    Drug use: No    Sexual activity: None   Lifestyle    Physical activity     Days per week: None     Minutes per session: None    Stress: None   Relationships    Social connections     Talks on phone: None     Gets together: None     Attends Buddhism service: None     Active member of club or organization: None     Attends meetings of clubs or organizations: None     Relationship status: None    Intimate partner violence     Fear of current or ex partner: None     Emotionally abused: None     Physically abused: None     Forced sexual activity: None   Other Topics Concern    None   Social History Narrative    None       Allergies   Allergen Reactions    Aspirin Swelling    Penicillins Rash       Current Outpatient Medications   Medication Sig Dispense Refill    Lancet Device MISC 1 Device by Does not apply route 4 times daily (before meals and nightly) Test 4x/day. 1 each 0    Lancets MISC 1 each by Does not apply route 3 times daily 100 each 11    blood glucose monitor strips Use  strip 11    insulin lispro (HUMALOG KWIKPEN) 200 UNIT/ML SOPN pen INJECT SUBCUTANEOUSLY 3  UNITS FOR EVERY 15 GRAMS OF CARBOHYDRATES; 65-70 UNITS  DAILY 36 mL 3    LANTUS SOLOSTAR 100 UNIT/ML injection pen INJECT SUBCUTANEOUSLY 30  UNITS TWICE DAILY 60 mL 3    blood glucose monitor kit and supplies Use TID 1 kit 0    clopidogrel (PLAVIX) 75 MG tablet Take 1 tablet by mouth nightly 90 tablet 1    lisinopril (PRINIVIL;ZESTRIL) 10 MG tablet Take 1 tablet by mouth nightly 90 tablet 1    simvastatin (ZOCOR) 40 MG tablet Take 1 tablet by mouth nightly 90 tablet 1    Exenatide (BYDUREON) 2 MG PEN Inject 1 pen into the skin once weekly 12 pen 1    meloxicam (MOBIC) 15 MG tablet Take 1 tablet by mouth daily 90 tablet 3    OLANZapine (ZYPREXA) 20 MG tablet Take 1 tablet by mouth nightly 90 tablet 3    omeprazole (PRILOSEC) 40 MG delayed release capsule Take 1 capsule by mouth daily 90 capsule 3    venlafaxine (EFFEXOR XR) 75 MG extended release capsule Take 1 capsule by mouth daily 90 capsule 3    albuterol-ipratropium (COMBIVENT RESPIMAT)  MCG/ACT AERS inhaler Inhale 1 puff into the lungs every 4 hours as needed for Wheezing or Shortness of Breath 3 Inhaler 3    tiZANidine (ZANAFLEX) 4 MG tablet Take 4 mg by mouth nightly  0    nystatin-triamcinolone (MYCOLOG II) 116466-0.1 UNIT/GM-% cream Apply topically 2 times daily. 60 g 1    acetaminophen-codeine (TYLENOL/CODEINE #4) 300-60 MG per tablet Take 1 tablet by mouth 3 times daily as needed. Elsi Duarte UNABLE TO FIND Fentanyl 1,000 mg/day and Bupivicaine 30 mg/day via implanted pump      metFORMIN (GLUCOPHAGE-XR) 500 MG supple. Cardiovascular: Regular rhythm, S1 normal, S2 normal, normal heart sounds, intact distal pulses and normal pulses. PMI is not displaced. No murmur heard. Pulmonary/Chest: She has no wheezes. She has no rales. She exhibits no tenderness. Abdominal: Soft. Bowel sounds are normal. She exhibits no distension and no mass. There is no splenomegaly or hepatomegaly. There is no abdominal tenderness. No hernia. Neurological: She is alert and oriented to person, place, and time. She has normal motor skills. Gait normal.   Skin: Skin is warm and dry. No cyanosis. No jaundice. Nails show no clubbing. Patient Active Problem List   Diagnosis    Chronic low back pain    Combined forms of age-related cataract of both eyes    COPD (chronic obstructive pulmonary disease) ([address]    Dermatochalasis of both upper eyelids    Diabetes mellitus type 2 without retinopathy ([address]    HTN (hypertension)    Neuropathy, lower extremity    Depression    Drug-induced constipation    POTS (postural orthostatic tachycardia syndrome)    Smoker    Post laminectomy syndrome    TIA (transient ischemic attack)    Morbidly obese ([address]    Calcification of abdominal aorta (HCC)    Type 2 diabetes mellitus without complication ([address]           No orders of the defined types were placed in this encounter. No orders of the defined types were placed in this encounter. Assessment/Orders:       ICD-10-CM    1. POTS (postural orthostatic tachycardia syndrome)  I49.8    2. Essential hypertension  I10        No orders of the defined types were placed in this encounter. There are no discontinued medications. No orders of the defined types were placed in this encounter. Plan:    Stay on same medications. See me in 6 months.         Electronically signed by: Santiago Vuong MD  1/12/2021 1:24 PM

## 2021-03-09 RX ORDER — LISINOPRIL 10 MG/1
TABLET ORAL
Qty: 90 TABLET | Refills: 2 | Status: SHIPPED | OUTPATIENT
Start: 2021-03-09 | End: 2022-02-16

## 2021-03-09 RX ORDER — CLOPIDOGREL BISULFATE 75 MG/1
TABLET ORAL
Qty: 90 TABLET | Refills: 2 | Status: SHIPPED | OUTPATIENT
Start: 2021-03-09 | End: 2022-03-09 | Stop reason: SDUPTHER

## 2021-03-09 RX ORDER — SIMVASTATIN 40 MG
TABLET ORAL
Qty: 90 TABLET | Refills: 2 | Status: SHIPPED | OUTPATIENT
Start: 2021-03-09 | End: 2021-12-15

## 2021-03-16 ENCOUNTER — OFFICE VISIT (OUTPATIENT)
Dept: FAMILY MEDICINE CLINIC | Age: 66
End: 2021-03-16
Payer: MEDICARE

## 2021-03-16 VITALS
WEIGHT: 216 LBS | SYSTOLIC BLOOD PRESSURE: 122 MMHG | TEMPERATURE: 97.1 F | BODY MASS INDEX: 38.27 KG/M2 | HEART RATE: 64 BPM | DIASTOLIC BLOOD PRESSURE: 80 MMHG | OXYGEN SATURATION: 97 % | HEIGHT: 63 IN

## 2021-03-16 DIAGNOSIS — I10 ESSENTIAL HYPERTENSION: ICD-10-CM

## 2021-03-16 DIAGNOSIS — J43.9 PULMONARY EMPHYSEMA, UNSPECIFIED EMPHYSEMA TYPE (HCC): ICD-10-CM

## 2021-03-16 DIAGNOSIS — E11.9 DIABETES MELLITUS TYPE 2 WITHOUT RETINOPATHY (HCC): Primary | ICD-10-CM

## 2021-03-16 DIAGNOSIS — E66.01 MORBIDLY OBESE (HCC): ICD-10-CM

## 2021-03-16 DIAGNOSIS — I70.0 CALCIFICATION OF ABDOMINAL AORTA (HCC): ICD-10-CM

## 2021-03-16 LAB — HBA1C MFR BLD: 6.4 %

## 2021-03-16 PROCEDURE — G8482 FLU IMMUNIZE ORDER/ADMIN: HCPCS | Performed by: FAMILY MEDICINE

## 2021-03-16 PROCEDURE — 83036 HEMOGLOBIN GLYCOSYLATED A1C: CPT | Performed by: FAMILY MEDICINE

## 2021-03-16 PROCEDURE — 3044F HG A1C LEVEL LT 7.0%: CPT | Performed by: FAMILY MEDICINE

## 2021-03-16 PROCEDURE — 99213 OFFICE O/P EST LOW 20 MIN: CPT | Performed by: FAMILY MEDICINE

## 2021-03-16 PROCEDURE — G8417 CALC BMI ABV UP PARAM F/U: HCPCS | Performed by: FAMILY MEDICINE

## 2021-03-16 PROCEDURE — 2022F DILAT RTA XM EVC RTNOPTHY: CPT | Performed by: FAMILY MEDICINE

## 2021-03-16 PROCEDURE — 3017F COLORECTAL CA SCREEN DOC REV: CPT | Performed by: FAMILY MEDICINE

## 2021-03-16 PROCEDURE — G8926 SPIRO NO PERF OR DOC: HCPCS | Performed by: FAMILY MEDICINE

## 2021-03-16 PROCEDURE — G8427 DOCREV CUR MEDS BY ELIG CLIN: HCPCS | Performed by: FAMILY MEDICINE

## 2021-03-16 PROCEDURE — 3023F SPIROM DOC REV: CPT | Performed by: FAMILY MEDICINE

## 2021-03-16 PROCEDURE — 4004F PT TOBACCO SCREEN RCVD TLK: CPT | Performed by: FAMILY MEDICINE

## 2021-03-16 RX ORDER — EXENATIDE 2 MG/.65ML
INJECTION, SUSPENSION, EXTENDED RELEASE SUBCUTANEOUS
Qty: 12 EACH | Refills: 3 | Status: SHIPPED | OUTPATIENT
Start: 2021-03-16 | End: 2021-10-05

## 2021-03-16 ASSESSMENT — ENCOUNTER SYMPTOMS
CONSTIPATION: 0
DIARRHEA: 0
ABDOMINAL PAIN: 0
WHEEZING: 0
RHINORRHEA: 0
COUGH: 0
SORE THROAT: 0
SHORTNESS OF BREATH: 0

## 2021-03-16 NOTE — PROGRESS NOTES
[address] PRIMARY CARE  [address] [id number]  Dept: [phone]  Dept Fax: [phone]  Loc: [phone]     Chief Complaint  Chief Complaint   Patient presents with    Diabetes       HPI:  61 y. o.female who presents for the following:    HTN: tolerating meds and compliant    DM2: a1c 6.4 from 6.2; compliant with meds; compliant with diet; No excessive thirst, urination, or blurry vision. Current diabetes regimen:   - metformin  - bydureon  - lantus 22 BID  - lispro 3u/15g carb(usual 9-12u TID AC)         Review of Systems   Constitutional: Negative for chills and fever. HENT: Negative for congestion, rhinorrhea and sore throat. Respiratory: Negative for cough, shortness of breath and wheezing. Gastrointestinal: Negative for abdominal pain, constipation and diarrhea. Endocrine: Negative for polydipsia and polyuria. Genitourinary: Negative for dysuria, frequency and urgency. Neurological: Negative for syncope, light-headedness, numbness and headaches. Psychiatric/Behavioral: Negative for sleep disturbance. The patient is not nervous/anxious.         Past Medical History:   Diagnosis Date    Arthritis     Cerebral artery occlusion with cerebral infarction [facility]) 2016 June    TIA / sx left sided weakness & fell & unable to get up off floor for several hours    COPD (chronic obstructive pulmonary disease) ([facility]     smoking habit > 40 yrs    Depression     Headache     Hyperlipidemia     meds > 10 yrs    Hypertension     meds since TIA / 6/2016    Neuropathy     POTS (postural orthostatic tachycardia syndrome)     2000's    Restless legs syndrome     Type 2 diabetes mellitus without complication (HCC)     hx > 7 yrs     Past Surgical History:   Procedure Laterality Date    ABDOMINAL EXPLORATION SURGERY  1970s    due to abdominal pain / no definite dx    BACK SURGERY  12/24/2018    [facility] at [facility] Right 1989    due to tendonitis    ENDOMETRIAL ABLATION  1990s    AUB    INSERT/ REPLACE INFUSN PUMP,PROGRAMMABLE N/A 5/29/2019    REMOVAL OF INTRATHECAL PUMP RESERVOIR performed by Kolton Sinclair MD at [address] [place] [address] Drive,[facility]      pain pump placement 2010 & replacement 2016    TONSILLECTOMY      age 24     Social History     Socioeconomic History    Marital status:      Spouse name: Not on file    Number of children: Not on file    Years of education: Not on file    Highest education level: Not on file   Occupational History    Not on file   Social Needs    Financial resource strain: Not on file    Food insecurity     Worry: Not on file     Inability: Not on file    Transportation needs     Medical: Not on file     Non-medical: Not on file   Tobacco Use    Smoking status: Current Every Day Smoker     Packs/day: 1.00     Years: 44.00     Pack years: 44.00     Types: Cigarettes     Start date: 1975    Smokeless tobacco: Never Used   Substance and Sexual Activity    Alcohol use: No    Drug use: No    Sexual activity: Not on file   Lifestyle    Physical activity     Days per week: Not on file     Minutes per session: Not on file    Stress: Not on file   Relationships    Social connections     Talks on phone: Not on file     Gets together: Not on file     Attends Lutheran service: Not on file     Active member of club or organization: Not on file     Attends meetings of clubs or organizations: Not on file     Relationship status: Not on file    Intimate partner violence     Fear of current or ex partner: Not on file     Emotionally abused: Not on file     Physically abused: Not on file     Forced sexual activity: Not on file   Other Topics Concern    Not on file   Social History Narrative    Not on file     Family History   Problem Relation Age of Onset    Stroke Mother     High Blood Pressure Mother     High Cholesterol Mother  Diabetes Father         Pre    Heart Attack Father     Cancer Maternal Grandmother         colon    Cancer Maternal Grandfather     Alzheimer's Disease Paternal Grandmother     Alzheimer's Disease Paternal Grandfather     Diabetes Paternal Grandfather     COPD Sister       Allergies   Allergen Reactions    Aspirin Swelling    Penicillins Rash     Current Outpatient Medications   Medication Sig Dispense Refill    Exenatide (BYDUREON) 2 MG PEN INJECT THE CONTENTS OF 1  PEN INTO THE SKIN ONCE  WEEKLY 12 each 3    clopidogrel (PLAVIX) 75 MG tablet TAKE 1 TABLET BY MOUTH AT  NIGHT 90 tablet 2    lisinopril (PRINIVIL;ZESTRIL) 10 MG tablet TAKE 1 TABLET BY MOUTH AT  NIGHT 90 tablet 2    simvastatin (ZOCOR) 40 MG tablet TAKE 1 TABLET BY MOUTH AT  NIGHT 90 tablet 2    metFORMIN (GLUCOPHAGE-XR) 500 MG extended release tablet TAKE 1 TABLET BY MOUTH  TWICE DAILY 180 tablet 1    Lancet Device MISC 1 Device by Does not apply route 4 times daily (before meals and nightly) Test 4x/day.  1 each 0    Lancets MISC 1 each by Does not apply route 3 times daily 100 each 11    blood glucose monitor strips Use  strip 11    insulin lispro (HUMALOG KWIKPEN) 200 UNIT/ML SOPN pen INJECT SUBCUTANEOUSLY 3  UNITS FOR EVERY 15 GRAMS OF CARBOHYDRATES; 65-70 UNITS  DAILY 36 mL 3    LANTUS SOLOSTAR 100 UNIT/ML injection pen INJECT SUBCUTANEOUSLY 30  UNITS TWICE DAILY 60 mL 3    blood glucose monitor kit and supplies Use TID 1 kit 0    meloxicam (MOBIC) 15 MG tablet Take 1 tablet by mouth daily 90 tablet 3    OLANZapine (ZYPREXA) 20 MG tablet Take 1 tablet by mouth nightly 90 tablet 3    omeprazole (PRILOSEC) 40 MG delayed release capsule Take 1 capsule by mouth daily 90 capsule 3    venlafaxine (EFFEXOR XR) 75 MG extended release capsule Take 1 capsule by mouth daily 90 capsule 3    DULoxetine (CYMBALTA) 30 MG extended release capsule Take 3 capsules by mouth daily 90 capsule 3    albuterol-ipratropium (COMBIVENT here mainly for DM2:  - DM2: controlled; will continue on current regimen; f/u 6mo for DM2 visit  - HTN: controlled; will continue on current regimen      Diagnosis Orders   1. Diabetes mellitus type 2 without retinopathy ([facility]  POCT glycosylated hemoglobin (Hb A1C)    HM DIABETES FOOT EXAM    Exenatide (BYDUREON) 2 MG PEN   2. Pulmonary emphysema, unspecified emphysema type ([facility]     3. Calcification of abdominal aorta ([facility]     4. Morbidly obese ([facility]          Return for AWV.     Mariusz Tran MD

## 2021-04-05 ENCOUNTER — HOSPITAL ENCOUNTER (OUTPATIENT)
Age: 66
Setting detail: SPECIMEN
Discharge: HOME | End: 2021-04-05
Payer: MEDICARE

## 2021-04-05 ENCOUNTER — OFFICE VISIT (OUTPATIENT)
Dept: FAMILY MEDICINE CLINIC | Age: 66
End: 2021-04-05
Payer: MEDICARE

## 2021-04-05 VITALS
WEIGHT: 216 LBS | TEMPERATURE: 97.2 F | BODY MASS INDEX: 38.27 KG/M2 | HEART RATE: 100 BPM | OXYGEN SATURATION: 97 % | SYSTOLIC BLOOD PRESSURE: 114 MMHG | HEIGHT: 63 IN | DIASTOLIC BLOOD PRESSURE: 80 MMHG

## 2021-04-05 DIAGNOSIS — Z53.20 HIV SCREENING DECLINED: ICD-10-CM

## 2021-04-05 DIAGNOSIS — Z13.31 POSITIVE DEPRESSION SCREENING: ICD-10-CM

## 2021-04-05 DIAGNOSIS — F32.89 OTHER DEPRESSION: ICD-10-CM

## 2021-04-05 DIAGNOSIS — Z00.00 ROUTINE GENERAL MEDICAL EXAMINATION AT A HEALTH CARE FACILITY: Primary | ICD-10-CM

## 2021-04-05 DIAGNOSIS — Z11.4 ENCOUNTER FOR SCREENING FOR HIV: ICD-10-CM

## 2021-04-05 LAB
ALBUMIN SERPL-MCNC: 4 G/DL (ref 3.5–4.6)
ALP BLD-CCNC: 109 U/L (ref 40–130)
ALT SERPL-CCNC: 7 U/L (ref 0–33)
ANION GAP SERPL CALCULATED.3IONS-SCNC: 11 MEQ/L (ref 9–15)
AST SERPL-CCNC: 27 U/L (ref 0–35)
BILIRUB SERPL-MCNC: 0.3 MG/DL (ref 0.2–0.7)
BUN BLDV-MCNC: 15 MG/DL (ref 8–23)
CALCIUM SERPL-MCNC: 9.1 MG/DL (ref 8.5–9.9)
CHLORIDE BLD-SCNC: 100 MEQ/L (ref 95–107)
CHOLESTEROL, TOTAL: 155 MG/DL (ref 0–199)
CO2: 24 MEQ/L (ref 20–31)
CREAT SERPL-MCNC: 0.65 MG/DL (ref 0.5–0.9)
GFR AFRICAN AMERICAN: >60
GFR NON-AFRICAN AMERICAN: >60
GLOBULIN: 3.1 G/DL (ref 2.3–3.5)
GLUCOSE BLD-MCNC: 128 MG/DL (ref 70–99)
HDLC SERPL-MCNC: 40 MG/DL (ref 40–59)
LDL CHOLESTEROL CALCULATED: 87 MG/DL (ref 0–129)
POTASSIUM SERPL-SCNC: 4.7 MEQ/L (ref 3.4–4.9)
SODIUM BLD-SCNC: 135 MEQ/L (ref 135–144)
TOTAL PROTEIN: 7.1 G/DL (ref 6.3–8)
TRIGL SERPL-MCNC: 139 MG/DL (ref 0–150)

## 2021-04-05 PROCEDURE — G8431 POS CLIN DEPRES SCRN F/U DOC: HCPCS | Performed by: FAMILY MEDICINE

## 2021-04-05 PROCEDURE — G0438 PPPS, INITIAL VISIT: HCPCS | Performed by: FAMILY MEDICINE

## 2021-04-05 PROCEDURE — 3017F COLORECTAL CA SCREEN DOC REV: CPT | Performed by: FAMILY MEDICINE

## 2021-04-05 PROCEDURE — 93000 ELECTROCARDIOGRAM COMPLETE: CPT | Performed by: FAMILY MEDICINE

## 2021-04-05 PROCEDURE — 80061 LIPID PANEL: CPT

## 2021-04-05 PROCEDURE — 36415 COLL VENOUS BLD VENIPUNCTURE: CPT

## 2021-04-05 PROCEDURE — 80053 COMPREHEN METABOLIC PANEL: CPT

## 2021-04-05 PROCEDURE — 87389 HIV-1 AG W/HIV-1&-2 AB AG IA: CPT

## 2021-04-05 RX ORDER — VENLAFAXINE HYDROCHLORIDE 150 MG/1
150 CAPSULE, EXTENDED RELEASE ORAL DAILY
Qty: 90 CAPSULE | Refills: 3 | Status: SHIPPED | OUTPATIENT
Start: 2021-04-05 | End: 2022-03-09

## 2021-04-05 ASSESSMENT — PATIENT HEALTH QUESTIONNAIRE - PHQ9
SUM OF ALL RESPONSES TO PHQ QUESTIONS 1-9: 14
1. LITTLE INTEREST OR PLEASURE IN DOING THINGS: 3
9. THOUGHTS THAT YOU WOULD BE BETTER OFF DEAD, OR OF HURTING YOURSELF: 0
4. FEELING TIRED OR HAVING LITTLE ENERGY: 0
SUM OF ALL RESPONSES TO PHQ9 QUESTIONS 1 & 2: 6
10. IF YOU CHECKED OFF ANY PROBLEMS, HOW DIFFICULT HAVE THESE PROBLEMS MADE IT FOR YOU TO DO YOUR WORK, TAKE CARE OF THINGS AT HOME, OR GET ALONG WITH OTHER PEOPLE: 1
SUM OF ALL RESPONSES TO PHQ9 QUESTIONS 1 & 2: 6
SUM OF ALL RESPONSES TO PHQ QUESTIONS 1-9: 6

## 2021-04-05 ASSESSMENT — COLUMBIA-SUICIDE SEVERITY RATING SCALE - C-SSRS: 6. HAVE YOU EVER DONE ANYTHING, STARTED TO DO ANYTHING, OR PREPARED TO DO ANYTHING TO END YOUR LIFE?: NO

## 2021-04-05 NOTE — PROGRESS NOTES
On the basis of positive PHQ-9 screening (PHQ-9 Total Score: 14), the following plan was implemented: per plan. Patient will follow-up with PCP. Medicare Annual Wellness Visit  Name: Stephan Eid Date: 2021   MRN: [id number] Sex: Female   Age: 61 y.o. Ethnicity: Non-/Non    : 1955 Race: Marcella Love is here for Medicare AWV (Welcome to [facility] )    Screenings for behavioral, psychosocial and functional/safety risks, and cognitive dysfunction are all negative except as indicated below. These results, as well as other patient data from the [address] form, are documented in Flowsheets linked to this Encounter. Allergies   Allergen Reactions    Aspirin Swelling    Penicillins Rash         Prior to Visit Medications    Medication Sig Taking? Authorizing Provider   venlafaxine (EFFEXOR XR) 150 MG extended release capsule Take 1 capsule by mouth daily Yes Christen Kauffman MD   Exenatide (BYDUREON) 2 MG PEN INJECT THE CONTENTS OF 1  PEN INTO THE SKIN ONCE  WEEKLY Yes Christen Kauffman MD   clopidogrel (PLAVIX) 75 MG tablet TAKE 1 TABLET BY MOUTH AT  NIGHT Yes Pratibha Calzada MD   lisinopril (PRINIVIL;ZESTRIL) 10 MG tablet TAKE 1 TABLET BY MOUTH AT  NIGHT Yes Pratibha Calzada MD   simvastatin (ZOCOR) 40 MG tablet TAKE 1 TABLET BY MOUTH AT  NIGHT Yes Pratibha Calzada MD   metFORMIN (GLUCOPHAGE-XR) 500 MG extended release tablet TAKE 1 TABLET BY MOUTH  TWICE DAILY Yes Christen Kauffman MD   Lancet Device MISC 1 Device by Does not apply route 4 times daily (before meals and nightly) Test 4x/day.  Yes Christen Kauffman MD   Lancets MISC 1 each by Does not apply route 3 times daily Yes Christen Kauffman MD   blood glucose monitor strips Use TID Yes Christen Kauffman MD   insulin lispro (HUMALOG KWIKPEN) 200 UNIT/ML SOPN pen INJECT SUBCUTANEOUSLY 3  UNITS FOR EVERY 15 GRAMS OF CARBOHYDRATES; 65-70 UNITS  DAILY Yes Christen Kauffman MD   LANTUS SOLOSTAR 100 UNIT/ML injection pen INJECT SUBCUTANEOUSLY 30  UNITS TWICE DAILY Yes Bekah Spaulding MD   blood glucose monitor kit and supplies Use TID Yes Bekah Spaulding MD   meloxicam (MOBIC) 15 MG tablet Take 1 tablet by mouth daily Yes Bekah Spaulding MD   OLANZapine (ZYPREXA) 20 MG tablet Take 1 tablet by mouth nightly Yes Bekah Spaulding MD   omeprazole (PRILOSEC) 40 MG delayed release capsule Take 1 capsule by mouth daily Yes Bekah Spaulding MD   albuterol-ipratropium (COMBIVENT RESPIMAT)  MCG/ACT AERS inhaler Inhale 1 puff into the lungs every 4 hours as needed for Wheezing or Shortness of Breath  Patient taking differently: Inhale 1 puff into the lungs every 4 hours as needed for Wheezing or Shortness of Breath  Yes Bekah Spaulding MD   tiZANidine (ZANAFLEX) 4 MG tablet Take 4 mg by mouth nightly Yes Historical Provider, MD   nystatin-triamcinolone (MYCOLOG II) 758838-6.1 UNIT/GM-% cream Apply topically 2 times daily. Yes Bekah Spaulding MD   acetaminophen-codeine (TYLENOL/CODEINE #4) 300-60 MG per tablet Take 1 tablet by mouth 3 times daily as needed. . Yes Historical Provider, MD   UNABLE TO FIND Fentanyl 1,000 mg/day and Bupivicaine 30 mg/day via implanted pump Yes Historical Provider, MD         Past Medical History:   Diagnosis Date    Arthritis     Cerebral artery occlusion with cerebral infarction [facility]) 2016 June    TIA / sx left sided weakness & fell & unable to get up off floor for several hours    COPD (chronic obstructive pulmonary disease) ([facility]     smoking habit > 40 yrs    Depression     Headache     Hyperlipidemia     meds > 10 yrs    Hypertension     meds since TIA / 6/2016    Neuropathy     POTS (postural orthostatic tachycardia syndrome)     2000's    Restless legs syndrome     Type 2 diabetes mellitus without complication ([facility]     hx > 7 yrs       Past Surgical History:   Procedure Laterality Date    ABDOMINAL EXPLORATION SURGERY  1970s    due to abdominal pain / no definite dx    BACK SURGERY  12/24/2018 [facility] Right 1989    due to tendonitis    ENDOMETRIAL ABLATION  1990s    AUB    INSERT/ REPLACE INFUSN PUMP,PROGRAMMABLE N/A 5/29/2019    REMOVAL OF INTRATHECAL PUMP RESERVOIR performed by Miensh Castro MD at [address] [facility]      pain pump placement 2010 & replacement 2016    TONSILLECTOMY      age 24         Family History   Problem Relation Age of Onset    Stroke Mother     High Blood Pressure Mother     High Cholesterol Mother     Diabetes Father         Pre    Heart Attack Father     Cancer Maternal Grandmother         colon    Cancer Maternal Grandfather     Alzheimer's Disease Paternal Grandmother     Alzheimer's Disease Paternal Grandfather     Diabetes Paternal Grandfather     COPD Sister        CareTeam (Including outside providers/suppliers regularly involved in providing care):   Patient Care Team:  Vic Fernandez MD as PCP - General (Family Medicine)  Vic Fernandez MD as PCP - [facility]    Wt Readings from Last 3 Encounters:   04/05/21 216 lb (98 kg)   03/16/21 216 lb (98 kg)   01/12/21 217 lb 9.6 oz (98.7 kg)     Vitals:    04/05/21 0856   BP: 114/80   Pulse: 100   Temp: 97.2 °F (36.2 °C)   SpO2: 97%   Weight: 216 lb (98 kg)   Height: 5' 3\" (1.6 m)     Body mass index is 38.26 kg/m². Based upon direct observation of the patient, evaluation of cognition reveals recent and remote memory intact. Patient's complete Health Risk Assessment and screening values have been reviewed and are found in Flowsheets. The following problems were reviewed today and where indicated follow up appointments were made and/or referrals ordered.     Positive Risk Factor Screenings with Interventions:       Depression:  PHQ-2 Score: 6  PHQ-9 Total Score: 14    Severity:1-4 = minimal depression, 5-9 = mild depression, 10-14 = moderate depression, 15-19 = moderately severe depression, 20-27 = severe depression  Depression Interventions:  · Patient declines any further evaluation/treatment for this issue     Substance History:  Social History     Tobacco History     Smoking Status  Current Every Day Smoker Smoking Start Date  1/1/1975 Smoking Frequency  1 pack/day for 44 years (40 pk yrs) Smoking Tobacco Type  Cigarettes    Smokeless Tobacco Use  Never Used          Alcohol History     Alcohol Use Status  No          Drug Use     Drug Use Status  No          Sexual Activity     Sexually Active  Not Asked               Alcohol Screening:       A score of 8 or more is associated with harmful or hazardous drinking. A score of 13 or more in women, and 15 or more in men, is likely to indicate alcohol dependence. Substance Abuse Interventions:  · Tobacco abuse:  patient is not ready to work toward tobacco cessation at this time    [address] [facility] and ACP:  General  In general, how would you say your health is?: Good  In the past 7 days, have you experienced any of the following?  New or Increased Pain, New or Increased Fatigue, Loneliness, Social Isolation, Stress or Anger?: (!) New or Increased Fatigue  Do you get the social and emotional support that you need?: Yes  Do you have a Living Will?: Yes  Advance Directives     Power of [address] [place] [address] Will ACP-Advance Directive ACP-Power of     Not on File Not on File Not on File Not on File      General Health Risk Interventions:  · Fatigue: patient declines any further evaluation/treatment for this issue    Health Habits/Nutrition:  Health Habits/Nutrition  Do you exercise for at least 20 minutes 2-3 times per week?: (!) No  Have you lost any weight without trying in the past 3 months?: No  Do you eat only one meal per day?: No  Have you seen the dentist within the past year?: N/A - wear dentures  Body mass index: (!) 38.26  Health Habits/Nutrition Interventions:  · Inadequate physical activity:  educational materials provided to promote increased physical activity    Hearing/Vision:  No exam data present  Hearing/Vision  Do you or your family notice any trouble with your hearing that hasn't been managed with hearing aids?: No  Have you had an eye exam within the past year?: (!) No  Hearing/Vision Interventions:  · Vision concerns:  patient encouraged to make appointment with his/her eye specialist    Safety:  Safety  Do you have working smoke detectors?: Yes  Have all throw rugs been removed or fastened?: (!) No  Do you have non-slip mats or surfaces in all bathtubs/showers?: (!) No  Do all of your stairways have a railing or banister?: (!) No  Are your doorways, halls and stairs free of clutter?: Yes  Do you always fasten your seatbelt when you are in a car?: Yes  Safety Interventions:  · Home safety tips provided     Personalized Preventive Plan   Current Health Maintenance Status  Immunization History   Administered Date(s) Administered    Influenza A ([id number]) Vaccine PF IM 01/23/2010    Influenza Virus Vaccine 09/22/2014, 09/28/2016, 12/08/2017, 10/17/2018    Influenza, Mehreen Pangman, IM, (6 mo and older Fluzone, Flulaval, Fluarix and 3 yrs and older Afluria) 10/17/2018, 10/08/2019    Influenza, Quadv, IM, PF (6 mo and older Fluzone, Flulaval, Fluarix, and 3 yrs and older Afluria) 09/25/2020    Pneumococcal Polysaccharide (Eothghsst24) 03/19/2014    Tdap (Boostrix, Adacel) 02/19/2014        Health Maintenance   Topic Date Due    Diabetic retinal exam  Never done    HIV screen  Never done    COVID-19 Vaccine (1) Never done    Shingles Vaccine (1 of 2) Never done    Diabetic microalbuminuria test  10/17/2019    Lipid screen  11/06/2019    Potassium monitoring  05/24/2020    Creatinine monitoring  05/24/2020    Low dose CT lung screening  06/11/2020    Annual Wellness Visit (AWV)  Never done    Colon cancer screen colonoscopy  04/04/2021    Breast cancer screen  06/11/2021    Cervical cancer screen  11/12/2021    Diabetic foot exam  03/16/2022    A1C test (Diabetic or Prediabetic)  03/16/2022    DTaP/Tdap/Td vaccine (2 - Td) 02/19/2024    Flu vaccine  Completed    Pneumococcal 0-64 years Vaccine  Completed    Hepatitis C screen  Completed    Hepatitis A vaccine  Aged Out    Hib vaccine  Aged Out    Meningococcal (ACWY) vaccine  Aged Out     Recommendations for [email or web address] Due: see orders and patient instructions/AVS.  . Recommended screening schedule for the next 5-10 years is provided to the patient in written form: see Patient Instructions/AVS.    Betsey Miller was seen today for medicare aw. Diagnoses and all orders for this visit:    Routine general medical examination at a health care facility  -     EKG 12 lead; Future  -     Lipid, Fasting; Future  -     Comprehensive Metabolic Panel, Fasting; Future    HIV screening declined    Encounter for screening for HIV  -     HIV Screen; Future    Positive depression screening  -     Positive Screen for Clinical Depression with a Documented Follow-up Plan     Other depression  -     venlafaxine (EFFEXOR XR) 150 MG extended release capsule;  Take 1 capsule by mouth daily

## 2021-04-05 NOTE — PATIENT INSTRUCTIONS
Personalized Preventive Plan for Namrata Mayer - 4/5/2021  Medicare offers a range of preventive health benefits. Some of the tests and screenings are paid in full while other may be subject to a deductible, co-insurance, and/or copay. Some of these benefits include a comprehensive review of your medical history including lifestyle, illnesses that may run in your family, and various assessments and screenings as appropriate. After reviewing your medical record and screening and assessments performed today your provider may have ordered immunizations, labs, imaging, and/or referrals for you. A list of these orders (if applicable) as well as your Preventive Care list are included within your After Visit Summary for your review. Other Preventive Recommendations:    · A preventive eye exam performed by an eye specialist is recommended every 1-2 years to screen for glaucoma; cataracts, macular degeneration, and other eye disorders. · A preventive dental visit is recommended every 6 months. · Try to get at least 150 minutes of exercise per week or 10,000 steps per day on a pedometer . · Order or download the FREE \"Exercise & Physical Activity: Your Everyday Guide\" from [employer]. Call 5-196.974.4147 or search [employer] online. · You need 5585-3908 mg of calcium and 5319-9485 IU of vitamin D per day. It is possible to meet your calcium requirement with diet alone, but a vitamin D supplement is usually necessary to meet this goal.  · When exposed to the sun, use a sunscreen that protects against both UVA and UVB radiation with an SPF of 30 or greater. Reapply every 2 to 3 hours or after sweating, drying off with a towel, or swimming. · Always wear a seat belt when traveling in a car. Always wear a helmet when riding a bicycle or motorcycle.

## 2021-04-07 ENCOUNTER — TELEPHONE (OUTPATIENT)
Dept: FAMILY MEDICINE CLINIC | Age: 66
End: 2021-04-07

## 2021-04-07 LAB — HIV AG/AB: NONREACTIVE

## 2021-08-17 ENCOUNTER — OFFICE VISIT (OUTPATIENT)
Dept: CARDIOLOGY CLINIC | Age: 66
End: 2021-08-17
Payer: MEDICARE

## 2021-08-17 VITALS
HEART RATE: 112 BPM | SYSTOLIC BLOOD PRESSURE: 136 MMHG | DIASTOLIC BLOOD PRESSURE: 74 MMHG | BODY MASS INDEX: 39.62 KG/M2 | OXYGEN SATURATION: 96 % | HEIGHT: 63 IN | WEIGHT: 223.6 LBS

## 2021-08-17 DIAGNOSIS — G90.A POTS (POSTURAL ORTHOSTATIC TACHYCARDIA SYNDROME): Primary | ICD-10-CM

## 2021-08-17 PROCEDURE — G8417 CALC BMI ABV UP PARAM F/U: HCPCS | Performed by: INTERNAL MEDICINE

## 2021-08-17 PROCEDURE — 3017F COLORECTAL CA SCREEN DOC REV: CPT | Performed by: INTERNAL MEDICINE

## 2021-08-17 PROCEDURE — G8427 DOCREV CUR MEDS BY ELIG CLIN: HCPCS | Performed by: INTERNAL MEDICINE

## 2021-08-17 PROCEDURE — 99213 OFFICE O/P EST LOW 20 MIN: CPT | Performed by: INTERNAL MEDICINE

## 2021-08-17 PROCEDURE — 4004F PT TOBACCO SCREEN RCVD TLK: CPT | Performed by: INTERNAL MEDICINE

## 2021-08-17 ASSESSMENT — ENCOUNTER SYMPTOMS
NAUSEA: 0
BLOOD IN STOOL: 0
CHEST TIGHTNESS: 0
VOMITING: 0
APNEA: 0
SHORTNESS OF BREATH: 0

## 2021-08-17 NOTE — PROGRESS NOTES
months.       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 Needs a spinal surgery. Had a stress test by Priyanka Hoffman which was reportedly normal. Reportedly had CVA for which she is on Plavix. Because of the surgery will hold the Plavix for a week before the procedure and resume it as soon as possible. Otherwise acceptable risk for surgery.       7/25/18: Patient presents today for follow-up of POTS. Retired beautician. He has seen Priyanka Hoffman in the past. Reportedly had a stroke. She is on Plavix. Has not been able to get disability. Diabetic. And dyslipidemia. She also has GERD. These other issues are stable. See me in 6 months.       4/24/18: Patient presents today for Evaluation of pots. Moderately obese retired beautician. Last episode of syncope was 2 years ago. She had what appears to be a TIA-like symptoms. About a year ago. With weakness on one side of the body. She then went to the ER at [facility]. Rare she had extensive workup by Evonnie Koyanagi was told she might have a TIA. She was started on Plavix. Has not had any spells since then. She still smokes. About a pack per day. He is diabetic. No definite angina congestive heart failure. She has dyslipidemia. She is on excellent medications. Height is for coronary artery disease but already had extensive workup including echo stress test carotid ultrasound. Carotids showed less than 30% stenosis bilaterally. I'll see her in 3 months. Continue same medications.  No angina congestive heart failure.         Past Medical History:   Diagnosis Date    Arthritis     Cerebral artery occlusion with cerebral infarction [facility]) 2016 June    TIA / sx left sided weakness & fell & unable to get up off floor for several hours    COPD (chronic obstructive pulmonary disease) ([address]     smoking habit > 40 yrs    Depression     Headache     Hyperlipidemia     meds > 10 yrs    Hypertension     meds since TIA / 6/2016    Neuropathy  POTS (postural orthostatic tachycardia syndrome)     2000's    Restless legs syndrome     Type 2 diabetes mellitus without complication (HCC)     hx > 7 yrs       Past Surgical History:   Procedure Laterality Date    ABDOMINAL EXPLORATION SURGERY  1970s    due to abdominal pain / no definite dx    BACK SURGERY  12/24/2018    l[facility] at [facility] Right 1989    due to tendonitis    ENDOMETRIAL ABLATION  1990s    AUB    INSERT/ REPLACE INFUSN PUMP,PROGRAMMABLE N/A 5/29/2019    REMOVAL OF INTRATHECAL PUMP RESERVOIR performed by Lelo Arrington MD at [address] [place] [address]      pain pump placement 2010 & replacement 2016    TONSILLECTOMY      age 24       Family History   Problem Relation Age of Onset    Stroke Mother     High Blood Pressure Mother     High Cholesterol Mother     Diabetes Father         Pre    Heart Attack Father     Cancer Maternal Grandmother         colon    Cancer Maternal Grandfather     Alzheimer's Disease Paternal Grandmother     Alzheimer's Disease Paternal Grandfather     Diabetes Paternal Grandfather     COPD Sister        Social History     Socioeconomic History    Marital status:      Spouse name: Not on file    Number of children: Not on file    Years of education: Not on file    Highest education level: Not on file   Occupational History    Not on file   Tobacco Use    Smoking status: Current Every Day Smoker     Packs/day: 1.00     Years: 44.00     Pack years: 44.00     Types: Cigarettes     Start date: 1975    Smokeless tobacco: Never Used   Substance and Sexual Activity    Alcohol use: No    Drug use: No    Sexual activity: Not on file   Other Topics Concern    Not on file   Social History Narrative    Not on file     Social Determinants of Health     Financial Resource Strain:     Difficulty of Paying Living Expenses:    Food Insecurity:     Worried About Running Out of Food in the Last Year:     Ran Out of Food in the Last Year:    Transportation Needs:     Lack of Transportation (Medical):  Lack of Transportation (Non-Medical):    Physical Activity:     Days of Exercise per Week:     Minutes of Exercise per Session:    Stress:     Feeling of Stress :    Social Connections:     Frequency of Communication with Friends and Family:     Frequency of Social Gatherings with Friends and Family:     Attends Jehovah's witness Services:     Active Member of Clubs or Organizations:     Attends Club or Organization Meetings:     Marital Status:    Intimate Partner Violence:     Fear of Current or Ex-Partner:     Emotionally Abused:     Physically Abused:     Sexually Abused: Allergies   Allergen Reactions    Aspirin Swelling    Penicillins Rash       Current Outpatient Medications   Medication Sig Dispense Refill    venlafaxine (EFFEXOR XR) 150 MG extended release capsule Take 1 capsule by mouth daily 90 capsule 3    Exenatide (BYDUREON) 2 MG PEN INJECT THE CONTENTS OF 1  PEN INTO THE SKIN ONCE  WEEKLY 12 each 3    clopidogrel (PLAVIX) 75 MG tablet TAKE 1 TABLET BY MOUTH AT  NIGHT 90 tablet 2    lisinopril (PRINIVIL;ZESTRIL) 10 MG tablet TAKE 1 TABLET BY MOUTH AT  NIGHT 90 tablet 2    simvastatin (ZOCOR) 40 MG tablet TAKE 1 TABLET BY MOUTH AT  NIGHT 90 tablet 2    metFORMIN (GLUCOPHAGE-XR) 500 MG extended release tablet TAKE 1 TABLET BY MOUTH  TWICE DAILY 180 tablet 1    Lancet Device MISC 1 Device by Does not apply route 4 times daily (before meals and nightly) Test 4x/day.  1 each 0    Lancets MISC 1 each by Does not apply route 3 times daily 100 each 11    blood glucose monitor strips Use  strip 11    insulin lispro (HUMALOG KWIKPEN) 200 UNIT/ML SOPN pen INJECT SUBCUTANEOUSLY 3  UNITS FOR EVERY 15 GRAMS OF CARBOHYDRATES; 65-70 UNITS  DAILY 36 mL 3    LANTUS SOLOSTAR 100 UNIT/ML injection pen INJECT SUBCUTANEOUSLY 30  UNITS TWICE DAILY 60 mL 3    blood glucose monitor kit and supplies Use TID 1 kit 0    meloxicam (MOBIC) 15 MG tablet Take 1 tablet by mouth daily 90 tablet 3    OLANZapine (ZYPREXA) 20 MG tablet Take 1 tablet by mouth nightly 90 tablet 3    omeprazole (PRILOSEC) 40 MG delayed release capsule Take 1 capsule by mouth daily 90 capsule 3    albuterol-ipratropium (COMBIVENT RESPIMAT)  MCG/ACT AERS inhaler Inhale 1 puff into the lungs every 4 hours as needed for Wheezing or Shortness of Breath (Patient taking differently: Inhale 1 puff into the lungs every 4 hours as needed for Wheezing or Shortness of Breath ) 3 Inhaler 3    tiZANidine (ZANAFLEX) 4 MG tablet Take 4 mg by mouth nightly  0    nystatin-triamcinolone (MYCOLOG II) 094642-1.1 UNIT/GM-% cream Apply topically 2 times daily. 60 g 1    acetaminophen-codeine (TYLENOL/CODEINE #4) 300-60 MG per tablet Take 1 tablet by mouth 3 times daily as needed. Marisa Bruno UNABLE TO FIND Fentanyl 1,000 mg/day and Bupivicaine 30 mg/day via implanted pump       No current facility-administered medications for this visit. Review of Systems:   Review of Systems   Constitutional: Negative for appetite change, diaphoresis and fatigue. HENT: Negative for nosebleeds. Respiratory: Negative for apnea, chest tightness and shortness of breath. Cardiovascular: Negative for chest pain, palpitations and leg swelling. Gastrointestinal: Negative for blood in stool, nausea and vomiting. Musculoskeletal: Negative for myalgias. Skin: Negative. Neurological: Negative for dizziness, seizures, syncope, weakness, light-headedness and headaches. Psychiatric/Behavioral: Negative for agitation, behavioral problems, confusion and decreased concentration. The patient is not nervous/anxious and is not hyperactive. All other systems reviewed and are negative. Review of System is negative except for as mentioned above.       Physical Examination:    /74 (Site: Left Upper Arm, Position: Sitting, Cuff Size: Large Adult) Pulse 112   Ht 5' 3\" (1.6 m)   Wt 223 lb 9.6 oz (101.4 kg)   LMP 05/24/1999   SpO2 96%   BMI 39.61 kg/m²    Physical Exam   Constitutional: She appears healthy. HENT:   Nose: Nose normal.   Mouth/Throat: Dentition is normal. Oropharynx is clear. Eyes: Pupils are equal, round, and reactive to light. Cardiovascular: Normal rate, regular rhythm, S1 normal, S2 normal, normal heart sounds, intact distal pulses and normal pulses. No extrasystoles are present. Exam reveals no gallop. No murmur heard. Pulmonary/Chest: Effort normal and breath sounds normal. She has no wheezes. She has no rales. She exhibits no tenderness. Abdominal: Soft. Bowel sounds are normal. She exhibits no distension and no mass. There is no splenomegaly or hepatomegaly. There is no abdominal tenderness. Musculoskeletal:         General: No tenderness, deformity or edema. Normal range of motion. Cervical back: Normal range of motion. Neurological: She is alert and oriented to person, place, and time. She has normal motor skills and normal reflexes. Gait normal.   Skin: Skin is warm and dry. Patient Active Problem List   Diagnosis    Chronic low back pain    Combined forms of age-related cataract of both eyes    COPD (chronic obstructive pulmonary disease) ([address]    Dermatochalasis of both upper eyelids    Diabetes mellitus type 2 without retinopathy ([address]    HTN (hypertension)    Neuropathy, lower extremity    Depression    Drug-induced constipation    POTS (postural orthostatic tachycardia syndrome)    Smoker    Post laminectomy syndrome    TIA (transient ischemic attack)    Morbidly obese ([address]    Calcification of abdominal aorta (HCC)    Type 2 diabetes mellitus without complication ([address]           No orders of the defined types were placed in this encounter. No orders of the defined types were placed in this encounter. Assessment/Orders:       ICD-10-CM    1.  POTS (postural orthostatic tachycardia syndrome)  I49.8        No orders of the defined types were placed in this encounter. There are no discontinued medications. No orders of the defined types were placed in this encounter. Plan:    Stay on same medications.     See me in 6 months        Electronically signed by: Geoff Mejia MD  8/17/2021 4:13 PM

## 2021-09-07 ENCOUNTER — TELEPHONE (OUTPATIENT)
Dept: PHARMACY | Facility: CLINIC | Age: 66
End: 2021-09-07

## 2021-09-07 NOTE — LETTER
[place]  [address], [address]        [place] [address] [facility] [address]           09/09/21     Dear Aparna Eteinne,    We tried to reach you recently regarding your lisinopril 10 mg tablets, but were unable to reach you on the telephone. We have on file that you are currently taking lisinopril 10 mg tablets- take 1 tablet by mouth at night. If you are no longer taking or taking differently, please call us at the number below so that we can discuss this and update your medication profile. It appears that this medication has not been filled at proper times. We are worried you might be missing doses or not taking it as directed. It is important that you take your medications regularly and try not to miss a single dose.     Some ways to help you remember to take and refill your medications are to:  · Use a pill box, set an alarm, and/or keep your medication near something that you do every day  · Fill a 3-month supply of your prescription at a time to save you time and trips to the pharmacy  if you would like assistance in switching your prescriptions to a 3-month supply, please contact us  · Ask your pharmacy if they participate in [facility]", a program where you can  all of your medications on the same day  · Ask your pharmacy if you can be set up with automatic refill, where they will automatically refill your prescription when it is due and let you know it's ready to     Sincerely,   Selena 2  Department, toll free: [phone], option 1

## 2021-09-07 NOTE — TELEPHONE ENCOUNTER
POPULATION HEALTH CLINICAL PHARMACY REVIEW: ADHERENCE REVIEW  Identified care gap per [place]; fills at [facility]: ACE/ARB adherence    Last Visit: 8/17/21    Patient found in Outcomes MTM and is not currently eligible for CMR/TIP    ASSESSMENT  ACE/ARB ADHERENCE    Per Insurance Records through 8/23/21 (2020 Edmundo Bueno = N/A%; YTD [place] = 0.78%; Potential Fail Date: 9/14/21):   lisinopril last filled on 4/21/21 for 90 day supply. Next refill due: 7/20/2021      Per [facility]:   lisinopril last picked up on 4/21/21 for 90 day supply. 2 refills remaining. Billed through [place]     BP Readings from Last 3 Encounters:   08/17/21 136/74   04/05/21 114/80   03/16/21 122/80     CrCl cannot be calculated (Patient's most recent lab result is older than the maximum 120 days allowed. ). PLAN  The following are interventions that have been identified:   - Patient overdue refilling lisinopril and active on home medication list.     Attempting to reach patient to review.  Left message asking for return call.     Future Appointments   Date Time Provider Tran Horne   10/5/2021  9:00 AM Jed Schultz MD [address] [facility]   2/22/2022 12:30 PM Jadyn Hdez MD [address], [place] PharmD Candidate [id number]

## 2021-09-09 NOTE — TELEPHONE ENCOUNTER
Second attempt made to contact patient. Left voice message to return call to [phone], option 1.       Nora Alarcon PharmD Candidate 1992

## 2021-09-09 NOTE — TELEPHONE ENCOUNTER
Reviewed with PharmD candidate. Letter sent.     Omkar Queen PharmD, [facility], [place].   Department, toll free: [phone]    For Pharmacy Admin Tracking Only     CPA in place:  No   Gap Closed?: No    Time Spent (min): 10

## 2021-10-05 ENCOUNTER — OFFICE VISIT (OUTPATIENT)
Dept: FAMILY MEDICINE CLINIC | Age: 66
End: 2021-10-05
Payer: MEDICARE

## 2021-10-05 ENCOUNTER — HOSPITAL ENCOUNTER (OUTPATIENT)
Age: 66
Setting detail: SPECIMEN
Discharge: HOME | End: 2021-10-05
Payer: MEDICARE

## 2021-10-05 VITALS
SYSTOLIC BLOOD PRESSURE: 126 MMHG | BODY MASS INDEX: 41.31 KG/M2 | TEMPERATURE: 97.1 F | DIASTOLIC BLOOD PRESSURE: 78 MMHG | WEIGHT: 233.2 LBS | OXYGEN SATURATION: 97 % | HEART RATE: 75 BPM

## 2021-10-05 DIAGNOSIS — E11.9 DIABETES MELLITUS TYPE 2 WITHOUT RETINOPATHY (HCC): ICD-10-CM

## 2021-10-05 DIAGNOSIS — Z23 NEED FOR INFLUENZA VACCINATION: ICD-10-CM

## 2021-10-05 DIAGNOSIS — Z12.11 COLON CANCER SCREENING: ICD-10-CM

## 2021-10-05 DIAGNOSIS — Z12.31 ENCOUNTER FOR SCREENING MAMMOGRAM FOR MALIGNANT NEOPLASM OF BREAST: ICD-10-CM

## 2021-10-05 DIAGNOSIS — E11.9 DIABETES MELLITUS TYPE 2 WITHOUT RETINOPATHY (HCC): Primary | ICD-10-CM

## 2021-10-05 LAB
CREATININE URINE: 79.7 MG/DL
HBA1C MFR BLD: 7.7 %
MICROALBUMIN UR-MCNC: <1.2 MG/DL
MICROALBUMIN/CREAT UR-RTO: NORMAL MG/G (ref 0–30)

## 2021-10-05 PROCEDURE — 99214 OFFICE O/P EST MOD 30 MIN: CPT | Performed by: FAMILY MEDICINE

## 2021-10-05 PROCEDURE — 82570 ASSAY OF URINE CREATININE: CPT

## 2021-10-05 PROCEDURE — G0008 ADMIN INFLUENZA VIRUS VAC: HCPCS | Performed by: FAMILY MEDICINE

## 2021-10-05 PROCEDURE — 4004F PT TOBACCO SCREEN RCVD TLK: CPT | Performed by: FAMILY MEDICINE

## 2021-10-05 PROCEDURE — 90674 CCIIV4 VAC NO PRSV 0.5 ML IM: CPT | Performed by: FAMILY MEDICINE

## 2021-10-05 PROCEDURE — G8417 CALC BMI ABV UP PARAM F/U: HCPCS | Performed by: FAMILY MEDICINE

## 2021-10-05 PROCEDURE — 83036 HEMOGLOBIN GLYCOSYLATED A1C: CPT | Performed by: FAMILY MEDICINE

## 2021-10-05 PROCEDURE — G8482 FLU IMMUNIZE ORDER/ADMIN: HCPCS | Performed by: FAMILY MEDICINE

## 2021-10-05 PROCEDURE — 3017F COLORECTAL CA SCREEN DOC REV: CPT | Performed by: FAMILY MEDICINE

## 2021-10-05 PROCEDURE — 3051F HG A1C>EQUAL 7.0%<8.0%: CPT | Performed by: FAMILY MEDICINE

## 2021-10-05 PROCEDURE — 82043 UR ALBUMIN QUANTITATIVE: CPT

## 2021-10-05 PROCEDURE — 2022F DILAT RTA XM EVC RTNOPTHY: CPT | Performed by: FAMILY MEDICINE

## 2021-10-05 PROCEDURE — G8427 DOCREV CUR MEDS BY ELIG CLIN: HCPCS | Performed by: FAMILY MEDICINE

## 2021-10-05 SDOH — ECONOMIC STABILITY: FOOD INSECURITY: WITHIN THE PAST 12 MONTHS, THE FOOD YOU BOUGHT JUST DIDN'T LAST AND YOU DIDN'T HAVE MONEY TO GET MORE.: NEVER TRUE

## 2021-10-05 SDOH — ECONOMIC STABILITY: FOOD INSECURITY: WITHIN THE PAST 12 MONTHS, YOU WORRIED THAT YOUR FOOD WOULD RUN OUT BEFORE YOU GOT MONEY TO BUY MORE.: NEVER TRUE

## 2021-10-05 ASSESSMENT — ENCOUNTER SYMPTOMS
DIARRHEA: 0
RHINORRHEA: 0
COUGH: 0
SHORTNESS OF BREATH: 0
ABDOMINAL PAIN: 0
CONSTIPATION: 0
WHEEZING: 0
SORE THROAT: 0

## 2021-10-05 ASSESSMENT — SOCIAL DETERMINANTS OF HEALTH (SDOH): HOW HARD IS IT FOR YOU TO PAY FOR THE VERY BASICS LIKE FOOD, HOUSING, MEDICAL CARE, AND HEATING?: NOT HARD AT ALL

## 2021-10-05 NOTE — PROGRESS NOTES
[address] PRIMARY CARE  [place] [address] [address]  Dept: [phone]  Dept Fax: : [phone]     Chief Complaint  Chief Complaint   Patient presents with    Diabetes     6 month follow up        HPI:  61 y. o.female who presents for the following:      HTN: tolerating meds and compliant    DM2: a1c 7.7 from 6.4; compliant with meds (she stopped the bydureon on her own due to expense); compliant with diet; No excessive thirst, urination, or blurry vision. Has been less active due to back pain    Current diabetes regimen:   - Metformin  - Lantus 22 BID (increase to 28)  - Lispro 6u/15g carb(usual 12-20u TID AC)         Review of Systems   Constitutional: Negative for chills and fever. HENT: Negative for congestion, rhinorrhea and sore throat. Respiratory: Negative for cough, shortness of breath and wheezing. Gastrointestinal: Negative for abdominal pain, constipation and diarrhea. Endocrine: Negative for polydipsia and polyuria. Genitourinary: Negative for dysuria, frequency and urgency. Neurological: Negative for syncope, light-headedness, numbness and headaches. Psychiatric/Behavioral: Negative for sleep disturbance. The patient is not nervous/anxious.         Past Medical History:   Diagnosis Date    Arthritis     Cerebral artery occlusion with cerebral infarction [facility]) 2016 June    TIA / sx left sided weakness & fell & unable to get up off floor for several hours    COPD (chronic obstructive pulmonary disease) ([facility] [address]     smoking habit > 40 yrs    Depression     Headache     Hyperlipidemia     meds > 10 yrs    Hypertension     meds since TIA / 6/2016    Neuropathy     POTS (postural orthostatic tachycardia syndrome)     2000's    Restless legs syndrome     Type 2 diabetes mellitus without complication (HCC)     hx > 7 yrs     Past Surgical History:   Procedure Laterality Date    ABDOMINAL EXPLORATION SURGERY  1970s    due to abdominal pain / no definite dx    BACK SURGERY  12/24/2018    [facility]    due to tendonitis    ENDOMETRIAL ABLATION  1990s    AUB    INSERT/ REPLACE INFUSN PUMP,PROGRAMMABLE N/A 5/29/2019    REMOVAL OF INTRATHECAL PUMP RESERVOIR performed by Yina Castro MD at [facility]      pain pump placement 2010 & replacement 2016    TONSILLECTOMY      age 24     Social History     Socioeconomic History    Marital status:      Spouse name: Not on file    Number of children: Not on file    Years of education: Not on file    Highest education level: Not on file   Occupational History    Not on file   Tobacco Use    Smoking status: Current Every Day Smoker     Packs/day: 1.00     Years: 44.00     Pack years: 44.00     Types: Cigarettes     Start date: 1975    Smokeless tobacco: Never Used   Substance and Sexual Activity    Alcohol use: No    Drug use: No    Sexual activity: Not on file   Other Topics Concern    Not on file   Social History Narrative    Not on file     Social Determinants of Health     Financial Resource Strain: Low Risk     Difficulty of Paying Living Expenses: Not hard at all   Food Insecurity: No Food Insecurity    Worried About [facility] in the Last Year: Never true    [facility] in the Last Year: Never true   Transportation Needs:     Lack of Transportation (Medical):      Lack of Transportation (Non-Medical):    Physical Activity:     Days of Exercise per Week:     Minutes of Exercise per Session:    Stress:     Feeling of Stress :    Social Connections:     Frequency of Communication with Friends and Family:     Frequency of Social Gatherings with Friends and Family:     Attends Jew Services:     Active Member of Clubs or Organizations:     Attends Club or Organization Meetings:     Marital Status:    Intimate Partner Violence:     Fear of Current or Ex-Partner:     Emotionally Abused:     Physically Abused:     Sexually Abused:      Family History   Problem Relation Age of Onset    Stroke Mother     High Blood Pressure Mother     High Cholesterol Mother     Diabetes Father         Pre    Heart Attack Father     Cancer Maternal Grandmother         colon    Cancer Maternal Grandfather     Alzheimer's Disease Paternal Grandmother     Alzheimer's Disease Paternal Grandfather     Diabetes Paternal Grandfather     COPD Sister       Allergies   Allergen Reactions    Aspirin Swelling    Penicillins Rash     Current Outpatient Medications   Medication Sig Dispense Refill    OLANZapine (ZYPREXA) 20 MG tablet TAKE 1 TABLET BY MOUTH AT  NIGHT 90 tablet 1    venlafaxine (EFFEXOR XR) 150 MG extended release capsule Take 1 capsule by mouth daily 90 capsule 3    clopidogrel (PLAVIX) 75 MG tablet TAKE 1 TABLET BY MOUTH AT  NIGHT 90 tablet 2    lisinopril (PRINIVIL;ZESTRIL) 10 MG tablet TAKE 1 TABLET BY MOUTH AT  NIGHT 90 tablet 2    simvastatin (ZOCOR) 40 MG tablet TAKE 1 TABLET BY MOUTH AT  NIGHT 90 tablet 2    metFORMIN (GLUCOPHAGE-XR) 500 MG extended release tablet TAKE 1 TABLET BY MOUTH  TWICE DAILY 180 tablet 1    Lancet Device MISC 1 Device by Does not apply route 4 times daily (before meals and nightly) Test 4x/day.  1 each 0    Lancets MISC 1 each by Does not apply route 3 times daily 100 each 11    blood glucose monitor strips Use  strip 11    insulin lispro (HUMALOG KWIKPEN) 200 UNIT/ML SOPN pen INJECT SUBCUTANEOUSLY 3  UNITS FOR EVERY 15 GRAMS OF CARBOHYDRATES; 65-70 UNITS  DAILY 36 mL 3    LANTUS SOLOSTAR 100 UNIT/ML injection pen INJECT SUBCUTANEOUSLY 30  UNITS TWICE DAILY 60 mL 3    blood glucose monitor kit and supplies Use TID 1 kit 0    meloxicam (MOBIC) 15 MG tablet Take 1 tablet by mouth daily 90 tablet 3    omeprazole (PRILOSEC) 40 MG delayed release capsule Take 1 capsule by mouth daily 90 capsule 3    albuterol-ipratropium (COMBIVENT RESPIMAT)  MCG/ACT AERS inhaler Inhale 1 puff into the lungs every 4 hours as needed for Wheezing or Shortness of Breath (Patient taking differently: Inhale 1 puff into the lungs every 4 hours as needed for Wheezing or Shortness of Breath ) 3 Inhaler 3    tiZANidine (ZANAFLEX) 4 MG tablet Take 4 mg by mouth nightly  0    nystatin-triamcinolone (MYCOLOG II) 766565-9.1 UNIT/GM-% cream Apply topically 2 times daily. 60 g 1    acetaminophen-codeine (TYLENOL/CODEINE #4) 300-60 MG per tablet Take 1 tablet by mouth 3 times daily as needed. Mahesh Oziel UNABLE TO FIND Fentanyl 1,000 mg/day and Bupivicaine 30 mg/day via implanted pump       No current facility-administered medications for this visit. Vitals:    10/05/21 0906   BP: 126/78   Site: Right Upper Arm   Position: Sitting   Cuff Size: Large Adult   Pulse: 75   Temp: 97.1 °F (36.2 °C)   TempSrc: Infrared   SpO2: 97%   Weight: 233 lb 3.2 oz (105.8 kg)       Physical exam:  Physical Exam  Vitals reviewed. Constitutional:       General: She is not in acute distress. Appearance: She is well-developed. HENT:      Head: Normocephalic and atraumatic. Mouth/Throat:      Pharynx: No oropharyngeal exudate. Neck:      Thyroid: No thyromegaly. Cardiovascular:      Rate and Rhythm: Normal rate and regular rhythm. Heart sounds: Normal heart sounds. No murmur heard. Pulmonary:      Effort: Pulmonary effort is normal. No respiratory distress. Breath sounds: Normal breath sounds. No wheezing. Abdominal:      General: There is no distension. Palpations: Abdomen is soft. Tenderness: There is no abdominal tenderness. There is no guarding or rebound. Musculoskeletal:      Cervical back: Normal range of motion. Lymphadenopathy:      Cervical: No cervical adenopathy. Skin:     General: Skin is warm and dry.    Neurological:      Mental Status: She is alert and oriented to person, place, and time. Psychiatric:         Behavior: Behavior normal.         Assessment/Plan:  61 y.o. female here mainly for DM2:  - DM2: slipping; increase the lantus to 28     Diagnosis Orders   1. Diabetes mellitus type 2 without retinopathy ([facility] [address]  POCT glycosylated hemoglobin (Hb A1C)    Microalbumin / Creatinine Urine Ratio   2. Need for influenza vaccination  INFLUENZA, MDCK QUADV, 2 YRS AND OLDER, IM, PF, PREFILL SYR OR SDV, 0.5ML (FLUCELVAX QUADV, PF)   3. Encounter for screening mammogram for malignant neoplasm of breast  NASH DIGITAL SCREEN W OR WO CAD BILATERAL   4. Colon cancer screening  Cologuard        Return in about 3 months (around 1/5/2022) for DM2.     Sujey Nash MD

## 2021-10-06 DIAGNOSIS — M54.42 CHRONIC MIDLINE LOW BACK PAIN WITH LEFT-SIDED SCIATICA: ICD-10-CM

## 2021-10-06 DIAGNOSIS — K21.9 GASTROESOPHAGEAL REFLUX DISEASE: ICD-10-CM

## 2021-10-06 DIAGNOSIS — G89.29 CHRONIC MIDLINE LOW BACK PAIN WITH LEFT-SIDED SCIATICA: ICD-10-CM

## 2021-10-06 RX ORDER — MELOXICAM 15 MG/1
15 TABLET ORAL DAILY
Qty: 90 TABLET | Refills: 3 | Status: SHIPPED | OUTPATIENT
Start: 2021-10-06 | End: 2021-11-05 | Stop reason: SDUPTHER

## 2021-10-06 RX ORDER — OMEPRAZOLE 40 MG/1
40 CAPSULE, DELAYED RELEASE ORAL DAILY
Qty: 90 CAPSULE | Refills: 3 | Status: SHIPPED | OUTPATIENT
Start: 2021-10-06 | End: 2021-11-05 | Stop reason: SDUPTHER

## 2021-10-12 ENCOUNTER — HOSPITAL ENCOUNTER (OUTPATIENT)
Dept: WOMENS IMAGING | Age: 66
Discharge: HOME | End: 2021-10-14
Payer: MEDICARE

## 2021-10-12 DIAGNOSIS — Z12.31 ENCOUNTER FOR SCREENING MAMMOGRAM FOR MALIGNANT NEOPLASM OF BREAST: ICD-10-CM

## 2021-10-12 PROCEDURE — 77063 BREAST TOMOSYNTHESIS BI: CPT

## 2021-10-15 DIAGNOSIS — E11.9 DIABETES MELLITUS TYPE 2 WITHOUT RETINOPATHY (HCC): ICD-10-CM

## 2021-10-15 RX ORDER — METFORMIN HYDROCHLORIDE 500 MG/1
TABLET, EXTENDED RELEASE ORAL
Qty: 180 TABLET | Refills: 1 | Status: SHIPPED | OUTPATIENT
Start: 2021-10-15 | End: 2022-03-09

## 2021-10-15 NOTE — TELEPHONE ENCOUNTER
Comments: Last filled 3/9/21    Last Office Visit (last PCP visit):   10/5/2021    Next Visit Date:  Future Appointments   Date Time Provider Tran Horne   1/6/2022  9:00 AM David Phillips MD [facility] [facility]   2/22/2022 12:30 PM Kassandra Clement MD [address]       **If hasn't been seen in over a year OR hasn't followed up according to last diabetes/ADHD visit, make appointment for patient before sending refill to provider.     Rx requested:  Requested Prescriptions     Pending Prescriptions Disp Refills    metFORMIN (GLUCOPHAGE-XR) 500 MG extended release tablet 180 tablet 1     Sig: TAKE 1 TABLET BY MOUTH  TWICE DAILY

## 2021-11-05 DIAGNOSIS — M54.42 CHRONIC MIDLINE LOW BACK PAIN WITH LEFT-SIDED SCIATICA: ICD-10-CM

## 2021-11-05 DIAGNOSIS — E11.9 DIABETES MELLITUS TYPE 2 WITHOUT RETINOPATHY (HCC): ICD-10-CM

## 2021-11-05 DIAGNOSIS — K21.9 GASTROESOPHAGEAL REFLUX DISEASE: ICD-10-CM

## 2021-11-05 DIAGNOSIS — G89.29 CHRONIC MIDLINE LOW BACK PAIN WITH LEFT-SIDED SCIATICA: ICD-10-CM

## 2021-11-05 DIAGNOSIS — F32.89 OTHER DEPRESSION: ICD-10-CM

## 2021-11-05 RX ORDER — OMEPRAZOLE 40 MG/1
40 CAPSULE, DELAYED RELEASE ORAL DAILY
Qty: 90 CAPSULE | Refills: 3 | Status: SHIPPED | OUTPATIENT
Start: 2021-11-05 | End: 2022-10-17

## 2021-11-05 RX ORDER — LANCETS 30 GAUGE
1 EACH MISCELLANEOUS 3 TIMES DAILY
Qty: 100 EACH | Refills: 11 | Status: SHIPPED | OUTPATIENT
Start: 2021-11-05

## 2021-11-05 RX ORDER — MELOXICAM 15 MG/1
15 TABLET ORAL DAILY
Qty: 90 TABLET | Refills: 3 | Status: SHIPPED | OUTPATIENT
Start: 2021-11-05 | End: 2022-10-12

## 2021-11-05 RX ORDER — GLUCOSAMINE HCL/CHONDROITIN SU 500-400 MG
CAPSULE ORAL
Qty: 100 STRIP | Refills: 11 | Status: SHIPPED | OUTPATIENT
Start: 2021-11-05

## 2021-11-05 RX ORDER — OLANZAPINE 20 MG/1
TABLET ORAL
Qty: 90 TABLET | Refills: 1 | Status: SHIPPED | OUTPATIENT
Start: 2021-11-05 | End: 2022-03-17 | Stop reason: ALTCHOICE

## 2021-11-05 NOTE — TELEPHONE ENCOUNTER
Comments:     Last Office Visit (last PCP visit):   10/5/2021    Next Visit Date:  Future Appointments   Date Time Provider Tran Horne   2022  9:00 AM Sujey Nash MD 1201 [facility]   2022 12:30 PM Chinyere Keith MD [facility] [facility]       **If hasn't been seen in over a year OR hasn't followed up according to last diabetes/ADHD visit, make appointment for patient before sending refill to provider.     Rx requested:  Requested Prescriptions     Pending Prescriptions Disp Refills    blood glucose monitor strips 100 strip 11     Sig: Use TID    Lancets MISC 100 each 11     Si each by Does not apply route 3 times daily    OLANZapine (ZYPREXA) 20 MG tablet 90 tablet 1     Sig: TAKE 1 TABLET BY MOUTH AT  NIGHT    meloxicam (MOBIC) 15 MG tablet 90 tablet 3     Sig: Take 1 tablet by mouth daily    omeprazole (PRILOSEC) 40 MG delayed release capsule 90 capsule 3     Sig: Take 1 capsule by mouth daily

## 2022-01-06 ENCOUNTER — OFFICE VISIT (OUTPATIENT)
Dept: FAMILY MEDICINE CLINIC | Age: 67
End: 2022-01-06
Payer: MEDICARE

## 2022-01-06 VITALS
WEIGHT: 211.6 LBS | HEART RATE: 94 BPM | DIASTOLIC BLOOD PRESSURE: 74 MMHG | SYSTOLIC BLOOD PRESSURE: 112 MMHG | OXYGEN SATURATION: 96 % | TEMPERATURE: 96.6 F | BODY MASS INDEX: 37.49 KG/M2 | HEIGHT: 63 IN

## 2022-01-06 DIAGNOSIS — E11.9 DIABETES MELLITUS TYPE 2 WITHOUT RETINOPATHY (HCC): Primary | ICD-10-CM

## 2022-01-06 LAB — HBA1C MFR BLD: 6.5 %

## 2022-01-06 PROCEDURE — G8482 FLU IMMUNIZE ORDER/ADMIN: HCPCS | Performed by: FAMILY MEDICINE

## 2022-01-06 PROCEDURE — 83036 HEMOGLOBIN GLYCOSYLATED A1C: CPT | Performed by: FAMILY MEDICINE

## 2022-01-06 PROCEDURE — 4004F PT TOBACCO SCREEN RCVD TLK: CPT | Performed by: FAMILY MEDICINE

## 2022-01-06 PROCEDURE — 3017F COLORECTAL CA SCREEN DOC REV: CPT | Performed by: FAMILY MEDICINE

## 2022-01-06 PROCEDURE — 3044F HG A1C LEVEL LT 7.0%: CPT | Performed by: FAMILY MEDICINE

## 2022-01-06 PROCEDURE — 2022F DILAT RTA XM EVC RTNOPTHY: CPT | Performed by: FAMILY MEDICINE

## 2022-01-06 PROCEDURE — G8427 DOCREV CUR MEDS BY ELIG CLIN: HCPCS | Performed by: FAMILY MEDICINE

## 2022-01-06 PROCEDURE — 99213 OFFICE O/P EST LOW 20 MIN: CPT | Performed by: FAMILY MEDICINE

## 2022-01-06 PROCEDURE — G8417 CALC BMI ABV UP PARAM F/U: HCPCS | Performed by: FAMILY MEDICINE

## 2022-01-06 ASSESSMENT — ENCOUNTER SYMPTOMS
SORE THROAT: 0
ABDOMINAL PAIN: 0
WHEEZING: 0
SHORTNESS OF BREATH: 0
DIARRHEA: 0
RHINORRHEA: 0
CONSTIPATION: 0
COUGH: 0

## 2022-01-06 NOTE — PROGRESS NOTES
[address] PRIMARY CARE  [place] [address] [address]  Dept: [phone]  Dept Fax: [phone]  Loc: [phone]     Chief Complaint  Chief Complaint   Patient presents with    Diabetes       HPI:  61 y. o.female who presents for the following:      DM2: a1c 6.5 from 7.7; compliant with meds; compliant with diet; No excessive thirst, urination, or blurry vision. Has had a low to 75 once. Sugar in the 110's during the day. Current diabetes regimen:   - Metformin  - Lantus 28 BID  - Lispro 6u/15g carb(usual 12-20u TID AC; hasn't been taking due to risk of low sugar)         Review of Systems   Constitutional: Negative for chills and fever. HENT: Negative for congestion, rhinorrhea and sore throat. Respiratory: Negative for cough, shortness of breath and wheezing. Gastrointestinal: Negative for abdominal pain, constipation and diarrhea. Endocrine: Negative for polydipsia and polyuria. Genitourinary: Negative for dysuria, frequency and urgency. Neurological: Negative for syncope, light-headedness, numbness and headaches. Psychiatric/Behavioral: Negative for sleep disturbance. The patient is not nervous/anxious.         Past Medical History:   Diagnosis Date    Arthritis     Cerebral artery occlusion with cerebral infarction [facility]) 2016 June    TIA / sx left sided weakness & fell & unable to get up off floor for several hours    COPD (chronic obstructive pulmonary disease) ([facility] [address]     smoking habit > 40 yrs    Depression     Headache     Hyperlipidemia     meds > 10 yrs    Hypertension     meds since TIA / 6/2016    Neuropathy     POTS (postural orthostatic tachycardia syndrome)     2000's    Restless legs syndrome     Type 2 diabetes mellitus without complication (HCC)     hx > 7 yrs     Past Surgical History:   Procedure Laterality Date    ABDOMINAL EXPLORATION SURGERY  1970s    due to abdominal pain / no definite dx    BACK SURGERY  12/24/2018    [facility] at [facility] Right 1989    due to tendonitis    ENDOMETRIAL ABLATION  1990s    AUB    INSERT/ REPLACE INFUSN PUMP,PROGRAMMABLE N/A 5/29/2019    REMOVAL OF INTRATHECAL PUMP RESERVOIR performed by Carol Henson MD at [place] [address]      pain pump placement 2010 & replacement 2016    TONSILLECTOMY      age 24     Social History     Socioeconomic History    Marital status:      Spouse name: Not on file    Number of children: Not on file    Years of education: Not on file    Highest education level: Not on file   Occupational History    Not on file   Tobacco Use    Smoking status: Current Every Day Smoker     Packs/day: 1.00     Years: 44.00     Pack years: 44.00     Types: Cigarettes     Start date: 1975    Smokeless tobacco: Never Used   Substance and Sexual Activity    Alcohol use: No    Drug use: No    Sexual activity: Not on file   Other Topics Concern    Not on file   Social History Narrative    Not on file     Social Determinants of Health     Financial Resource Strain: Low Risk     Difficulty of Paying Living Expenses: Not hard at all   Food Insecurity: No Food Insecurity    Worried About [facility] in the Last Year: Never true    [facility] in the Last Year: Never true   Transportation Needs:     Lack of Transportation (Medical): Not on file    Lack of Transportation (Non-Medical):  Not on file   Physical Activity:     Days of Exercise per Week: Not on file    Minutes of Exercise per Session: Not on file   Stress:     Feeling of Stress : Not on file   Social Connections:     Frequency of Communication with Friends and Family: Not on file    Frequency of Social Gatherings with Friends and Family: Not on file    Attends Christian Services: Not on file    Active Member of Clubs or Organizations: Not on file    Attends Club or Organization Meetings: Not on file    Marital Status: Not on file   Intimate Partner Violence:     Fear of Current or Ex-Partner: Not on file    Emotionally Abused: Not on file    Physically Abused: Not on file    Sexually Abused: Not on file   Housing Stability:     Unable to Pay for Housing in the Last Year: Not on file    Number of Places Lived in the Last Year: Not on file    Unstable Housing in the Last Year: Not on file     Family History   Problem Relation Age of Onset    Stroke Mother     High Blood Pressure Mother     High Cholesterol Mother     Diabetes Father         Pre    Heart Attack Father     Cancer Maternal Grandmother         colon    Cancer Maternal Grandfather     Alzheimer's Disease Paternal Grandmother     Alzheimer's Disease Paternal Grandfather     Diabetes Paternal Grandfather     COPD Sister       Allergies   Allergen Reactions    Aspirin Swelling    Penicillins Rash     Current Outpatient Medications   Medication Sig Dispense Refill    simvastatin (ZOCOR) 40 MG tablet TAKE 1 TABLET BY MOUTH AT  NIGHT 90 tablet 2    LANTUS SOLOSTAR 100 UNIT/ML injection pen INJECT SUBCUTANEOUSLY 30  UNITS TWICE DAILY 60 mL 0    blood glucose monitor strips Use  strip 11    Lancets MISC 1 each by Does not apply route 3 times daily 100 each 11    OLANZapine (ZYPREXA) 20 MG tablet TAKE 1 TABLET BY MOUTH AT  NIGHT 90 tablet 1    meloxicam (MOBIC) 15 MG tablet Take 1 tablet by mouth daily 90 tablet 3    omeprazole (PRILOSEC) 40 MG delayed release capsule Take 1 capsule by mouth daily 90 capsule 3    metFORMIN (GLUCOPHAGE-XR) 500 MG extended release tablet TAKE 1 TABLET BY MOUTH  TWICE DAILY 180 tablet 1    venlafaxine (EFFEXOR XR) 150 MG extended release capsule Take 1 capsule by mouth daily 90 capsule 3    clopidogrel (PLAVIX) 75 MG tablet TAKE 1 TABLET BY MOUTH AT  NIGHT 90 tablet 2    lisinopril (PRINIVIL;ZESTRIL) 10 MG tablet TAKE 1 TABLET BY MOUTH AT  NIGHT 90 tablet 2    Lancet Device MISC 1 Device by Does not apply route 4 times daily (before meals and nightly) Test 4x/day. 1 each 0    insulin lispro (HUMALOG KWIKPEN) 200 UNIT/ML SOPN pen INJECT SUBCUTANEOUSLY 3  UNITS FOR EVERY 15 GRAMS OF CARBOHYDRATES; 65-70 UNITS  DAILY 36 mL 3    blood glucose monitor kit and supplies Use TID 1 kit 0    albuterol-ipratropium (COMBIVENT RESPIMAT)  MCG/ACT AERS inhaler Inhale 1 puff into the lungs every 4 hours as needed for Wheezing or Shortness of Breath (Patient taking differently: Inhale 1 puff into the lungs every 4 hours as needed for Wheezing or Shortness of Breath ) 3 Inhaler 3    tiZANidine (ZANAFLEX) 4 MG tablet Take 4 mg by mouth nightly  0    nystatin-triamcinolone (MYCOLOG II) 363634-2.1 UNIT/GM-% cream Apply topically 2 times daily. 60 g 1    acetaminophen-codeine (TYLENOL/CODEINE #4) 300-60 MG per tablet Take 1 tablet by mouth 3 times daily as needed. Olga Snell UNABLE TO FIND Fentanyl 1,000 mg/day and Bupivicaine 30 mg/day via implanted pump       No current facility-administered medications for this visit. Vitals:    01/06/22 0915   BP: 112/74   Site: Left Upper Arm   Position: Sitting   Cuff Size: Medium Adult   Pulse: 94   Temp: 96.6 °F (35.9 °C)   TempSrc: Infrared   SpO2: 96%   Weight: 211 lb 9.6 oz (96 kg)   Height: 5' 3\" (1.6 m)       Physical exam:  Physical Exam  Vitals reviewed. Constitutional:       General: She is not in acute distress. Appearance: She is well-developed. HENT:      Head: Normocephalic and atraumatic. Cardiovascular:      Rate and Rhythm: Normal rate. Pulmonary:      Effort: Pulmonary effort is normal. No respiratory distress. Musculoskeletal:      Cervical back: Normal range of motion. Skin:     General: Skin is warm and dry. Neurological:      Mental Status: She is alert and oriented to person, place, and time.    Psychiatric:         Behavior: Behavior normal.         Assessment/Plan:  61 y.o. female here mainly for DM2:  - DM2: controlled; will continue on current regimen; likely won't need the mealtime insulin but we'll decide on this at next visit. Diagnosis Orders   1. Diabetes mellitus type 2 without retinopathy ([facility] [address]  POCT glycosylated hemoglobin (Hb A1C)        Return in about 6 months (around 7/6/2022) for DM2.     Fatou Rios MD

## 2022-02-04 ENCOUNTER — TELEPHONE (OUTPATIENT)
Dept: FAMILY MEDICINE CLINIC | Age: 67
End: 2022-02-04

## 2022-02-04 NOTE — TELEPHONE ENCOUNTER
----- Message from Mini Torresone sent at 2/4/2022  3:46 PM EST -----  Subject: Appointment Request    Reason for Call: Semi-Routine Skin Problem    QUESTIONS  Type of Appointment? Established Patient  Reason for appointment request? No appointments available during search  Additional Information for Provider? pt ci requesting an appt due to lump   found on left side of her neck. . pt screened green  ---------------------------------------------------------------------------  --------------  CALL BACK INFO  What is the best way for the office to contact you? OK to leave message on   voicemail  Preferred Call Back Phone Number? [phone]  ---------------------------------------------------------------------------  --------------  SCRIPT ANSWERS  Relationship to Patient? Self  Are you having swelling in your throat or face? No  Are you having difficulty breathing? No  Have the symptoms worsened or spread in the last day? No  Are you having fevers (100.4), chills or sweats? No  Have you recently (14 days) seen a provider for this issue? No  Have you been diagnosed with, awaiting test results for, or told that you   are suspected of having COVID-19 (Coronavirus)? (If patient has tested   negative or was tested as a requirement for work, school, or travel and   not based on symptoms, answer no)? No  Within the past two weeks have you developed any of the following symptoms   (answer no if symptoms have been present longer than 2 weeks or began   more than 2 weeks ago)? Fever or Chills, Cough, Shortness of breath or   difficulty breathing, Loss of taste or smell, Sore throat, Nasal   congestion, Sneezing or runny nose, Fatigue or generalized body aches   (answer no if pain is specific to a body part e.g. back pain), Diarrhea,   Headache? No  Have you had close contact with someone with COVID-19 in the last 14 days? No  (Service Expert  click yes below to proceed with [facility]   Scheduling)? Yes

## 2022-02-07 ENCOUNTER — OFFICE VISIT (OUTPATIENT)
Dept: FAMILY MEDICINE CLINIC | Age: 67
End: 2022-02-07
Payer: MEDICARE

## 2022-02-07 VITALS
WEIGHT: 206 LBS | DIASTOLIC BLOOD PRESSURE: 80 MMHG | BODY MASS INDEX: 35.17 KG/M2 | HEART RATE: 93 BPM | SYSTOLIC BLOOD PRESSURE: 124 MMHG | OXYGEN SATURATION: 96 % | TEMPERATURE: 96.6 F | HEIGHT: 64 IN

## 2022-02-07 DIAGNOSIS — R22.1 NECK MASS: Primary | ICD-10-CM

## 2022-02-07 DIAGNOSIS — I70.0 CALCIFICATION OF ABDOMINAL AORTA (HCC): ICD-10-CM

## 2022-02-07 DIAGNOSIS — J43.9 PULMONARY EMPHYSEMA, UNSPECIFIED EMPHYSEMA TYPE (HCC): ICD-10-CM

## 2022-02-07 PROCEDURE — 4004F PT TOBACCO SCREEN RCVD TLK: CPT | Performed by: FAMILY MEDICINE

## 2022-02-07 PROCEDURE — 99213 OFFICE O/P EST LOW 20 MIN: CPT | Performed by: FAMILY MEDICINE

## 2022-02-07 PROCEDURE — G8482 FLU IMMUNIZE ORDER/ADMIN: HCPCS | Performed by: FAMILY MEDICINE

## 2022-02-07 PROCEDURE — 3017F COLORECTAL CA SCREEN DOC REV: CPT | Performed by: FAMILY MEDICINE

## 2022-02-07 PROCEDURE — G8417 CALC BMI ABV UP PARAM F/U: HCPCS | Performed by: FAMILY MEDICINE

## 2022-02-07 PROCEDURE — 3023F SPIROM DOC REV: CPT | Performed by: FAMILY MEDICINE

## 2022-02-07 PROCEDURE — G8427 DOCREV CUR MEDS BY ELIG CLIN: HCPCS | Performed by: FAMILY MEDICINE

## 2022-02-07 ASSESSMENT — ENCOUNTER SYMPTOMS
RHINORRHEA: 0
SORE THROAT: 0
WHEEZING: 0
SHORTNESS OF BREATH: 0
DIARRHEA: 0
ABDOMINAL PAIN: 0
COUGH: 0
CONSTIPATION: 0

## 2022-02-07 NOTE — PROGRESS NOTES
[address] [facility] PRIMARY CARE  [address] [place] [id number]  Dept: [phone]  Dept Fax: [phone]: [phone]     Chief Complaint  Chief Complaint   Patient presents with    Mass     left side of throat, gotten slightly smaller, noticed x1 week ago       HPI:  61 y. o.female who presents for the following:      Anterior neck mass: noticed lump on the L anterior neck last week; thinks it is getting smaller; no pain; no recent illness; smokes 1ppd since teenager;has noticed wt loss (233 lbs to 206 since 10/2022)    Review of Systems   Constitutional: Negative for chills and fever. HENT: Negative for congestion, rhinorrhea and sore throat. Respiratory: Negative for cough, shortness of breath and wheezing. Gastrointestinal: Negative for abdominal pain, constipation and diarrhea. Endocrine: Negative for polydipsia and polyuria. Genitourinary: Negative for dysuria, frequency and urgency. Neurological: Negative for syncope, light-headedness, numbness and headaches. Psychiatric/Behavioral: Negative for sleep disturbance. The patient is not nervous/anxious.         Past Medical History:   Diagnosis Date    Arthritis     Cerebral artery occlusion with cerebral infarction [facility]) 2016 June    TIA / sx left sided weakness & fell & unable to get up off floor for several hours    COPD (chronic obstructive pulmonary disease) ([facility] [address]     smoking habit > 40 yrs    Depression     Headache     Hyperlipidemia     meds > 10 yrs    Hypertension     meds since TIA / 6/2016    Neuropathy     POTS (postural orthostatic tachycardia syndrome)     2000's    Restless legs syndrome     Type 2 diabetes mellitus without complication (HCC)     hx > 7 yrs     Past Surgical History:   Procedure Laterality Date    ABDOMINAL EXPLORATION SURGERY  1970s    due to abdominal pain / no definite dx    BACK SURGERY  12/24/2018    [facility]    due to tendonitis    ENDOMETRIAL ABLATION  1990s    AUB    INSERT/ REPLACE INFUSN PUMP,PROGRAMMABLE N/A 5/29/2019    REMOVAL OF INTRATHECAL PUMP RESERVOIR performed [facility]ardo [facility]      pain pump placement 2010 & replacement 2016    TONSILLECTOMY      age 24     Social History     Socioeconomic History    Marital status:      Spouse name: Not on file    Number of children: Not on file    Years of education: Not on file    Highest education level: Not on file   Occupational History    Not on file   Tobacco Use    Smoking status: Current Every Day Smoker     Packs/day: 1.00     Years: 44.00     Pack years: 44.00     Types: Cigarettes     Start date: 1975    Smokeless tobacco: Never Used   Substance and Sexual Activity    Alcohol use: No    Drug use: No    Sexual activity: Not on file   Other Topics Concern    Not on file   Social History Narrative    Not on file     Social Determinants of Health     Financial Resource Strain: Low Risk     Difficulty of Paying Living Expenses: Not hard at all   Food Insecurity: No Food Insecurity    Worried About [address] in the Last Year: Never true    [facility] in the Last Year: Never true   Transportation Needs:     Lack of Transportation (Medical): Not on file    Lack of Transportation (Non-Medical):  Not on file   Physical Activity:     Days of Exercise per Week: Not on file    Minutes of Exercise per Session: Not on file   Stress:     Feeling of Stress : Not on file   Social Connections:     Frequency of Communication with Friends and Family: Not on file    Frequency of Social Gatherings with Friends and Family: Not on file    Attends Orthodoxy Services: Not on file    Active Member of Clubs or Organizations: Not on file    Attends Club or Organization Meetings: Not on file    Marital Status: Not on file   Intimate Partner Violence:     Fear of Current or Ex-Partner: Not on file    Emotionally Abused: Not on file    Physically Abused: Not on file    Sexually Abused: Not on file   Housing Stability:     Unable to Pay for Housing in the Last Year: Not on file    Number of Places Lived in the Last Year: Not on file    Unstable Housing in the Last Year: Not on file     Family History   Problem Relation Age of Onset    Stroke Mother     High Blood Pressure Mother     High Cholesterol Mother     Diabetes Father         Pre    Heart Attack Father     Cancer Maternal Grandmother         colon    Cancer Maternal Grandfather     Alzheimer's Disease Paternal Grandmother     Alzheimer's Disease Paternal Grandfather     Diabetes Paternal Grandfather     COPD Sister       Allergies   Allergen Reactions    Aspirin Swelling    Penicillins Rash     Current Outpatient Medications   Medication Sig Dispense Refill    simvastatin (ZOCOR) 40 MG tablet TAKE 1 TABLET BY MOUTH AT  NIGHT 90 tablet 2    LANTUS SOLOSTAR 100 UNIT/ML injection pen INJECT SUBCUTANEOUSLY 30  UNITS TWICE DAILY 60 mL 0    blood glucose monitor strips Use  strip 11    Lancets MISC 1 each by Does not apply route 3 times daily 100 each 11    meloxicam (MOBIC) 15 MG tablet Take 1 tablet by mouth daily 90 tablet 3    omeprazole (PRILOSEC) 40 MG delayed release capsule Take 1 capsule by mouth daily 90 capsule 3    metFORMIN (GLUCOPHAGE-XR) 500 MG extended release tablet TAKE 1 TABLET BY MOUTH  TWICE DAILY 180 tablet 1    venlafaxine (EFFEXOR XR) 150 MG extended release capsule Take 1 capsule by mouth daily 90 capsule 3    clopidogrel (PLAVIX) 75 MG tablet TAKE 1 TABLET BY MOUTH AT  NIGHT 90 tablet 2    lisinopril (PRINIVIL;ZESTRIL) 10 MG tablet TAKE 1 TABLET BY MOUTH AT  NIGHT 90 tablet 2    Lancet Device MISC 1 Device by Does not apply route 4 times daily (before meals and nightly) Test 4x/day.  1 each 0    insulin lispro (HUMALOG KWIKPEN) 200 UNIT/ML SOPN pen INJECT SUBCUTANEOUSLY 3  UNITS FOR EVERY 15 GRAMS OF CARBOHYDRATES; 65-70 UNITS  DAILY 36 mL 3    blood glucose monitor kit and supplies Use TID 1 kit 0    tiZANidine (ZANAFLEX) 4 MG tablet Take 4 mg by mouth nightly  0    acetaminophen-codeine (TYLENOL/CODEINE #4) 300-60 MG per tablet Take 1 tablet by mouth 3 times daily as needed. .      OLANZapine (ZYPREXA) 20 MG tablet TAKE 1 TABLET BY MOUTH AT  NIGHT (Patient not taking: Reported on 2/7/2022) 90 tablet 1    albuterol-ipratropium (COMBIVENT RESPIMAT)  MCG/ACT AERS inhaler Inhale 1 puff into the lungs every 4 hours as needed for Wheezing or Shortness of Breath (Patient not taking: Reported on 2/7/2022) 3 Inhaler 3    nystatin-triamcinolone (MYCOLOG II) 543753-2.1 UNIT/GM-% cream Apply topically 2 times daily. (Patient not taking: Reported on 2/7/2022) 60 g 1    UNABLE TO FIND Fentanyl 1,000 mg/day and Bupivicaine 30 mg/day via implanted pump (Patient not taking: Reported on 2/7/2022)       No current facility-administered medications for this visit. Vitals:    02/07/22 0949   BP: 124/80   Pulse: 93   Temp: 96.6 °F (35.9 °C)   TempSrc: Infrared   SpO2: 96%   Weight: 206 lb (93.4 kg)   Height: 5' 3.5\" (1.613 m)       Physical exam:  Physical Exam  Vitals reviewed. Constitutional:       General: She is not in acute distress. Appearance: She is well-developed. HENT:      Head: Normocephalic and atraumatic. Right Ear: Tympanic membrane, ear canal and external ear normal. Tympanic membrane is not erythematous. Tympanic membrane has normal mobility. Left Ear: Tympanic membrane, ear canal and external ear normal. Tympanic membrane is not erythematous. Tympanic membrane has normal mobility. Nose: Nose normal.      Mouth/Throat:      Pharynx: No oropharyngeal exudate. Neck:      Thyroid: No thyromegaly. Cardiovascular:      Rate and Rhythm: Normal rate and regular rhythm. Heart sounds: Normal heart sounds.  No murmur heard. Pulmonary:      Effort: Pulmonary effort is normal. No respiratory distress. Breath sounds: Normal breath sounds. No wheezing. Abdominal:      General: Bowel sounds are normal. There is no distension. Palpations: Abdomen is soft. Tenderness: There is no abdominal tenderness. There is no guarding or rebound. Musculoskeletal:      Cervical back: Normal range of motion. Lymphadenopathy:      Cervical: No cervical adenopathy. Skin:     General: Skin is warm and dry. Neurological:      Mental Status: She is alert and oriented to person, place, and time. Psychiatric:         Behavior: Behavior normal.         Assessment/Plan:  61 y.o. female here mainly for L anterior neck mass:  - possibly LN or parotid gland but seems atypical; checking CT neck; her wt loss has been significant. ADDENDUM: CT shows 2 suspicious LN's of the L neck; sending to ENT     Diagnosis Orders   1. Neck mass  CT SOFT TISSUE NECK W CONTRAST   2. Pulmonary emphysema, unspecified emphysema type ([address]     3. Calcification of abdominal aorta (HCC)          Return if symptoms worsen or fail to improve.     Amauri Keith MD

## 2022-02-11 ENCOUNTER — TELEPHONE (OUTPATIENT)
Dept: FAMILY MEDICINE CLINIC | Age: 67
End: 2022-02-11

## 2022-02-11 DIAGNOSIS — R22.1 NECK MASS: Primary | ICD-10-CM

## 2022-02-11 NOTE — TELEPHONE ENCOUNTER
[facility] called requesting a diagnosis code on the order for the bloodwork

## 2022-02-14 ENCOUNTER — HOSPITAL ENCOUNTER (OUTPATIENT)
Dept: CT IMAGING | Age: 67
Discharge: HOME | End: 2022-02-16
Payer: MEDICARE

## 2022-02-14 ENCOUNTER — HOSPITAL ENCOUNTER (OUTPATIENT)
Dept: LAB | Age: 67
Discharge: HOME | End: 2022-02-14
Payer: MEDICARE

## 2022-02-14 DIAGNOSIS — R22.1 MASS OF LEFT SIDE OF NECK: Primary | ICD-10-CM

## 2022-02-14 DIAGNOSIS — R22.1 NECK MASS: ICD-10-CM

## 2022-02-14 LAB
CREAT SERPL-MCNC: 0.67 MG/DL (ref 0.5–0.9)
GFR AFRICAN AMERICAN: >60
GFR NON-AFRICAN AMERICAN: >60

## 2022-02-14 PROCEDURE — 36415 COLL VENOUS BLD VENIPUNCTURE: CPT

## 2022-02-14 PROCEDURE — 70491 CT SOFT TISSUE NECK W/DYE: CPT

## 2022-02-14 PROCEDURE — 6360000004 HC RX CONTRAST MEDICATION: Performed by: FAMILY MEDICINE

## 2022-02-14 PROCEDURE — 82565 ASSAY OF CREATININE: CPT

## 2022-02-14 RX ADMIN — IOPAMIDOL 100 ML: 755 INJECTION, SOLUTION INTRAVENOUS at 13:03

## 2022-02-16 RX ORDER — LISINOPRIL 10 MG/1
TABLET ORAL
Qty: 90 TABLET | Refills: 3 | Status: SHIPPED | OUTPATIENT
Start: 2022-02-16

## 2022-02-16 NOTE — TELEPHONE ENCOUNTER
Comments:     Last Office Visit (last PCP visit):   8/17/2021    Next Visit Date:  Future Appointments   Date Time Provider Tran Horne   7/7/2022  9:30 AM Neha Aguillon MD [facility]       **If hasn't been seen in over a year OR hasn't followed up according to last diabetes/ADHD visit, make appointment for patient before sending refill to provider.     Rx requested:  Requested Prescriptions     Pending Prescriptions Disp Refills    lisinopril (PRINIVIL;ZESTRIL) 10 MG tablet [Pharmacy Med Name: Lisinopril 10 MG Oral Tablet] 90 tablet 3     Sig: TAKE 1 TABLET BY MOUTH AT  NIGHT

## 2022-03-09 DIAGNOSIS — E11.9 DIABETES MELLITUS TYPE 2 WITHOUT RETINOPATHY (HCC): ICD-10-CM

## 2022-03-09 DIAGNOSIS — F32.89 OTHER DEPRESSION: ICD-10-CM

## 2022-03-09 DIAGNOSIS — G45.9 TIA (TRANSIENT ISCHEMIC ATTACK): Primary | ICD-10-CM

## 2022-03-09 RX ORDER — VENLAFAXINE HYDROCHLORIDE 150 MG/1
150 CAPSULE, EXTENDED RELEASE ORAL DAILY
Qty: 90 CAPSULE | Refills: 3 | Status: SHIPPED | OUTPATIENT
Start: 2022-03-09

## 2022-03-09 RX ORDER — PEN NEEDLE, DIABETIC 31 G X1/4"
1 NEEDLE, DISPOSABLE MISCELLANEOUS DAILY
Qty: 100 EACH | Refills: 3 | Status: SHIPPED | OUTPATIENT
Start: 2022-03-09

## 2022-03-09 RX ORDER — METFORMIN HYDROCHLORIDE 500 MG/1
TABLET, EXTENDED RELEASE ORAL
Qty: 180 TABLET | Refills: 1 | Status: SHIPPED | OUTPATIENT
Start: 2022-03-09

## 2022-03-09 RX ORDER — CLOPIDOGREL BISULFATE 75 MG/1
TABLET ORAL
Qty: 90 TABLET | Refills: 3 | Status: SHIPPED | OUTPATIENT
Start: 2022-03-09

## 2022-03-09 NOTE — TELEPHONE ENCOUNTER
Comments: Patient also needs Pen needles for insulin: [id number]    Last Office Visit (last PCP visit):   2/7/2022    Next Visit Date:  Future Appointments   Date Time Provider Tran Horne   3/17/2022  3:15 PM Liz Rose, [address] [place] [address]   7/7/2022  9:30 AM Natalio Haynes MD [facility]       **If hasn't been seen in over a year OR hasn't followed up according to last diabetes/ADHD visit, make appointment for patient before sending refill to provider.     Rx requested:  Requested Prescriptions     Pending Prescriptions Disp Refills    clopidogrel (PLAVIX) 75 MG tablet 90 tablet 2

## 2022-03-09 NOTE — TELEPHONE ENCOUNTER
Comments:     Last Office Visit (last PCP visit):   2/7/2022    Next Visit Date:  Future Appointments   Date Time Provider Tran Horne   3/17/2022  3:15 PM Rodrigo Reese, [address] [facility] 15   7/7/2022  9:30 AM Debbie Lazo MD [facility]       **If hasn't been seen in over a year OR hasn't followed up according to last diabetes/ADHD visit, make appointment for patient before sending refill to provider.     Rx requested:  Requested Prescriptions     Pending Prescriptions Disp Refills    venlafaxine (EFFEXOR XR) 150 MG extended release capsule [Pharmacy Med Name: Venlafaxine HCl  MG Oral Capsule Extended Release 24 Hour] 90 capsule 3     Sig: TAKE 1 CAPSULE BY MOUTH  DAILY    metFORMIN (GLUCOPHAGE-XR) 500 MG extended release tablet [Pharmacy Med Name: metFORMIN HCl  MG Oral Tablet Extended Release 24 Hour] 180 tablet 3     Sig: TAKE 1 TABLET BY MOUTH  TWICE DAILY

## 2022-03-17 ENCOUNTER — OFFICE VISIT (OUTPATIENT)
Dept: CARDIOLOGY CLINIC | Age: 67
End: 2022-03-17
Payer: MEDICARE

## 2022-03-17 ENCOUNTER — TELEPHONE (OUTPATIENT)
Dept: PHARMACY | Facility: CLINIC | Age: 67
End: 2022-03-17

## 2022-03-17 VITALS
SYSTOLIC BLOOD PRESSURE: 122 MMHG | HEART RATE: 83 BPM | RESPIRATION RATE: 16 BRPM | DIASTOLIC BLOOD PRESSURE: 68 MMHG | BODY MASS INDEX: 35.26 KG/M2 | WEIGHT: 199 LBS | HEIGHT: 63 IN | OXYGEN SATURATION: 94 %

## 2022-03-17 DIAGNOSIS — G90.A POTS (POSTURAL ORTHOSTATIC TACHYCARDIA SYNDROME): Primary | ICD-10-CM

## 2022-03-17 DIAGNOSIS — E66.01 SEVERE OBESITY (BMI 35.0-39.9) WITH COMORBIDITY (HCC): ICD-10-CM

## 2022-03-17 DIAGNOSIS — G45.9 TIA (TRANSIENT ISCHEMIC ATTACK): ICD-10-CM

## 2022-03-17 DIAGNOSIS — R09.89 BILATERAL CAROTID BRUITS: ICD-10-CM

## 2022-03-17 DIAGNOSIS — I10 ESSENTIAL HYPERTENSION: ICD-10-CM

## 2022-03-17 DIAGNOSIS — R06.09 DOE (DYSPNEA ON EXERTION): ICD-10-CM

## 2022-03-17 PROCEDURE — 4040F PNEUMOC VAC/ADMIN/RCVD: CPT | Performed by: INTERNAL MEDICINE

## 2022-03-17 PROCEDURE — G8427 DOCREV CUR MEDS BY ELIG CLIN: HCPCS | Performed by: INTERNAL MEDICINE

## 2022-03-17 PROCEDURE — G8417 CALC BMI ABV UP PARAM F/U: HCPCS | Performed by: INTERNAL MEDICINE

## 2022-03-17 PROCEDURE — G8400 PT W/DXA NO RESULTS DOC: HCPCS | Performed by: INTERNAL MEDICINE

## 2022-03-17 PROCEDURE — 1090F PRES/ABSN URINE INCON ASSESS: CPT | Performed by: INTERNAL MEDICINE

## 2022-03-17 PROCEDURE — 3017F COLORECTAL CA SCREEN DOC REV: CPT | Performed by: INTERNAL MEDICINE

## 2022-03-17 PROCEDURE — 1123F ACP DISCUSS/DSCN MKR DOCD: CPT | Performed by: INTERNAL MEDICINE

## 2022-03-17 PROCEDURE — G8482 FLU IMMUNIZE ORDER/ADMIN: HCPCS | Performed by: INTERNAL MEDICINE

## 2022-03-17 PROCEDURE — 99214 OFFICE O/P EST MOD 30 MIN: CPT | Performed by: INTERNAL MEDICINE

## 2022-03-17 PROCEDURE — 4004F PT TOBACCO SCREEN RCVD TLK: CPT | Performed by: INTERNAL MEDICINE

## 2022-03-17 ASSESSMENT — ENCOUNTER SYMPTOMS
GASTROINTESTINAL NEGATIVE: 1
COUGH: 0
STRIDOR: 0
BLOOD IN STOOL: 0
NAUSEA: 0
SHORTNESS OF BREATH: 0
EYES NEGATIVE: 1
RESPIRATORY NEGATIVE: 1
WHEEZING: 0
CHEST TIGHTNESS: 0

## 2022-03-17 NOTE — TELEPHONE ENCOUNTER
For Pharmacy [id number] in place:  No   Intervention Detail: Adherence Monitorin   Gap Closed?: Yes    Time Spent (min): 15

## 2022-03-17 NOTE — PROGRESS NOTES
Subsequent Progress Note  Patient: Sai Crockett  YOB: 1955  MRN: [id number]    Chief Complaint:  Chief Complaint   Patient presents with    6 Month Follow-Up    Tachycardia     POTS       CV Data:    Subjective/HPI: Ahmed pt with POTS. No symtpoms since > 4 yrs. Doing well no cp + sob with exertional activity walks with rico after back surgery    Lives w   Smoker  No ETOH  Retired - .       EKG: SR 93    Past Medical History:   Diagnosis Date    Arthritis     Cerebral artery occlusion with cerebral infarction [facility]) 2016 June    TIA / sx left sided weakness & fell & unable to get up off floor for several hours    COPD (chronic obstructive pulmonary disease) (HCC)     smoking habit > 40 yrs    Depression     Headache     Hyperlipidemia     meds > 10 yrs    Hypertension     meds since TIA / 6/2016    Neuropathy     POTS (postural orthostatic tachycardia syndrome)     2000's    Restless legs syndrome     Type 2 diabetes mellitus without complication (HCC)     hx > 7 yrs       Past Surgical History:   Procedure Laterality Date    ABDOMINAL EXPLORATION SURGERY  1970s    due to abdominal pain / no definite dx    BACK SURGERY  12/24/2018    [facility] at [facility]    due to tendonitis    ENDOMETRIAL ABLATION  1990s    AUB    INSERT/ REPLACE INFUSN PUMP,PROGRAMMABLE N/A 5/29/2019    REMOVAL OF INTRATHECAL PUMP RESERVOIR performed by Kimberly Hua MD at [address] [facility]      pain pump placement 2010 & replacement 2016    TONSILLECTOMY      age 24       Family History   Problem Relation Age of Onset    Stroke Mother     High Blood Pressure Mother     High Cholesterol Mother     Diabetes Father         Pre    Heart Attack Father     Cancer Maternal Grandmother         colon    Cancer Maternal Grandfather     Alzheimer's Disease Paternal Grandmother     Alzheimer's Disease Paternal Grandfather    Ledy Cai Diabetes Paternal Grandfather     COPD Sister        Social History     Socioeconomic History    Marital status:      Spouse name: None    Number of children: None    Years of education: None    Highest education level: None   Occupational History    None   Tobacco Use    Smoking status: Current Every Day Smoker     Packs/day: 1.00     Years: 44.00     Pack years: 44.00     Types: Cigarettes     Start date: 1975    Smokeless tobacco: Never Used   Substance and Sexual Activity    Alcohol use: No    Drug use: No    Sexual activity: None   Other Topics Concern    None   Social History Narrative    None     Social Determinants of Health     Financial Resource Strain: Low Risk     Difficulty of Paying Living Expenses: Not hard at all   Food Insecurity: No Food Insecurity    Worried About Running Out of Food in the Last Year: Never true    Marianne of Food in the Last Year: Never true   Transportation Needs:     Lack of Transportation (Medical): Not on file    Lack of Transportation (Non-Medical):  Not on file   Physical Activity:     Days of Exercise per Week: Not on file    Minutes of Exercise per Session: Not on file   Stress:     Feeling of Stress : Not on file   Social Connections:     Frequency of Communication with Friends and Family: Not on file    Frequency of Social Gatherings with Friends and Family: Not on file    Attends Yazdanism Services: Not on file    Active Member of [address] [place] [address] [employer] or Organizations: Not on file    Attends Club or Organization Meetings: Not on file    Marital Status: Not on file   Intimate Partner Violence:     Fear of Current or Ex-Partner: Not on file    Emotionally Abused: Not on file    Physically Abused: Not on file    Sexually Abused: Not on file   Housing Stability:     Unable to Pay for Housing in the Last Year: Not on file    Number of [place] in the Last Year: Not on file    Unstable Housing in the Last Year: Not on file       Allergies Allergen Reactions    Aspirin Swelling    Penicillins Rash       Current Outpatient Medications   Medication Sig Dispense Refill    venlafaxine (EFFEXOR XR) 150 MG extended release capsule TAKE 1 CAPSULE BY MOUTH  DAILY 90 capsule 3    metFORMIN (GLUCOPHAGE-XR) 500 MG extended release tablet TAKE 1 TABLET BY MOUTH  TWICE DAILY 180 tablet 1    clopidogrel (PLAVIX) 75 MG tablet TAKE 1 TABLET BY MOUTH AT  NIGHT 90 tablet 3    Insulin Pen Needle (MEIJER PEN NEEDLES) 31G X 6 MM MISC 1 each by Does not apply route daily 100 each 3    lisinopril (PRINIVIL;ZESTRIL) 10 MG tablet TAKE 1 TABLET BY MOUTH AT  NIGHT 90 tablet 3    LANTUS SOLOSTAR 100 UNIT/ML injection pen INJECT SUBCUTANEOUSLY 30  UNITS TWICE DAILY 60 mL 1    simvastatin (ZOCOR) 40 MG tablet TAKE 1 TABLET BY MOUTH AT  NIGHT 90 tablet 2    blood glucose monitor strips Use  strip 11    Lancets MISC 1 each by Does not apply route 3 times daily 100 each 11    meloxicam (MOBIC) 15 MG tablet Take 1 tablet by mouth daily 90 tablet 3    omeprazole (PRILOSEC) 40 MG delayed release capsule Take 1 capsule by mouth daily 90 capsule 3    Lancet Device MISC 1 Device by Does not apply route 4 times daily (before meals and nightly) Test 4x/day. 1 each 0    insulin lispro (HUMALOG KWIKPEN) 200 UNIT/ML SOPN pen INJECT SUBCUTANEOUSLY 3  UNITS FOR EVERY 15 GRAMS OF CARBOHYDRATES; 65-70 UNITS  DAILY 36 mL 3    blood glucose monitor kit and supplies Use TID 1 kit 0    tiZANidine (ZANAFLEX) 4 MG tablet Take 4 mg by mouth nightly  0    acetaminophen-codeine (TYLENOL/CODEINE #4) 300-60 MG per tablet Take 1 tablet by mouth 3 times daily as needed. .       No current facility-administered medications for this visit. Review of Systems:   Review of Systems   Constitutional: Negative. Negative for diaphoresis and fatigue. HENT: Negative. Eyes: Negative. Respiratory: Negative.   Negative for cough, chest tightness, shortness of breath, wheezing and stridor. Cardiovascular: Negative. Negative for chest pain, palpitations and leg swelling. Gastrointestinal: Negative. Negative for blood in stool and nausea. Genitourinary: Negative. Musculoskeletal: Negative. Skin: Negative. Neurological: Negative. Negative for dizziness, syncope, weakness and light-headedness. Hematological: Negative. Psychiatric/Behavioral: Negative. Physical Examination:    /68 (Site: Right Upper Arm, Position: Sitting, Cuff Size: Large Adult)   Pulse 83   Resp 16   Ht 5' 3\" (1.6 m)   Wt 199 lb (90.3 kg)   LMP 05/24/1999   SpO2 94%   BMI 35.25 kg/m²    Physical Exam   Constitutional: She appears healthy. No distress. HENT:   Normal cephalic and Atraumatic   Eyes: Pupils are equal, round, and reactive to light. Neck: Thyroid normal. No JVD present. No neck adenopathy. No thyromegaly present. Cardiovascular: Normal rate, regular rhythm, normal heart sounds, intact distal pulses and normal pulses. Pulmonary/Chest: Effort normal and breath sounds normal. She has no wheezes. She has no rales. She exhibits no tenderness. Abdominal: Soft. Bowel sounds are normal. There is no abdominal tenderness. Musculoskeletal:         General: No tenderness or edema. Normal range of motion. Cervical back: Normal range of motion and neck supple. Neurological: She is alert and oriented to person, place, and time. Skin: Skin is warm. No cyanosis. Nails show no clubbing.        LABS:  CBC:   Lab Results   Component Value Date    WBC 13.8 05/24/2019    RBC 5.08 05/24/2019    HGB 15.2 05/24/2019    HCT 44.9 05/24/2019    MCV 88.4 05/24/2019    MCH 29.9 05/24/2019    MCHC 33.8 05/24/2019    RDW 17.3 05/24/2019     05/24/2019    MPV 9.8 12/03/2018     Lipids:  Lab Results   Component Value Date    CHOL 155 04/05/2021    CHOL 150 11/06/2018    CHOL 147 01/26/2017     Lab Results   Component Value Date    TRIG 139 04/05/2021    TRIG 151 11/06/2018 TRIG 119 01/26/2017     Lab Results   Component Value Date    HDL 40 04/05/2021    HDL 40 11/06/2018    HDL 35 01/26/2017     Lab Results   Component Value Date    LDLCALC 87 04/05/2021    LDLCALC 80 11/06/2018    LDLCALC 88 01/26/2017     Lab Results   Component Value Date    VLDL 24 01/26/2017     Lab Results   Component Value Date    CHOLHDLRATIO 4.20 01/26/2017     CMP:    Lab Results   Component Value Date     04/05/2021    K 4.7 04/05/2021     04/05/2021    CO2 24 04/05/2021    BUN 15 04/05/2021    CREATININE 0.67 02/14/2022    GFRAA >60.0 02/14/2022    LABGLOM >60.0 02/14/2022    GLUCOSE 128 04/05/2021    PROT 7.1 04/05/2021    LABALBU 4.0 04/05/2021    CALCIUM 9.1 04/05/2021    BILITOT 0.3 04/05/2021    ALKPHOS 109 04/05/2021    AST 27 04/05/2021    ALT 7 04/05/2021     BMP:    Lab Results   Component Value Date     04/05/2021    K 4.7 04/05/2021     04/05/2021    CO2 24 04/05/2021    BUN 15 04/05/2021    LABALBU 4.0 04/05/2021    CREATININE 0.67 02/14/2022    CALCIUM 9.1 04/05/2021    GFRAA >60.0 02/14/2022    LABGLOM >60.0 02/14/2022    GLUCOSE 128 04/05/2021     Magnesium:  No results found for: MG  TSH:No results found for: TSHFT4, TSH    Patient Active Problem List   Diagnosis    Chronic low back pain    Combined forms of age-related cataract of both eyes    COPD (chronic obstructive pulmonary disease) ([address]    Dermatochalasis of both upper eyelids    Diabetes mellitus type 2 without retinopathy ([address]    HTN (hypertension)    Neuropathy, lower extremity    Depression    Drug-induced constipation    POTS (postural orthostatic tachycardia syndrome)    Smoker    Post laminectomy syndrome    TIA (transient ischemic attack)    Morbidly obese (HCC)    Calcification of abdominal aorta (HCC)    Type 2 diabetes mellitus without complication ([address]    Mass of left side of neck       Medications Discontinued During This Encounter   Medication Reason    albuterol-ipratropium (COMBIVENT RESPIMAT)  MCG/ACT AERS inhaler LIST CLEANUP    nystatin-triamcinolone (MYCOLOG II) 586333-5.1 UNIT/GM-% cream LIST CLEANUP    OLANZapine (ZYPREXA) 20 MG tablet DISCONTINUED BY ANOTHER CLINICIAN    UNABLE TO FIND DISCONTINUED BY ANOTHER CLINICIAN       Modified Medications    No medications on file       No orders of the defined types were placed in this encounter. Assessment/Plan:    1. POTS (postural orthostatic tachycardia syndrome)   stable     2. Essential hypertension   stable continue meds. Low salt diet     3. TIA (transient ischemic attack)     - US CAROTID ARTERY BILATERAL; Future    4. HORN (dyspnea on exertion)     - Echo 2D w doppler w color complete; Future  - NM MYOCARDIAL SPECT REST EXERCISE OR RX; Future    5. Severe obesity (BMI 35.0-39. 9) with comorbidity (HCC)   loose weight     6. Bilateral carotid bruits     - US CAROTID ARTERY BILATERAL; Future       Counseling:  Heart Healthy Lifestyle, Improve BMI, Stop Smoking, Low Salt Diet, Take Precautions to Prevent Falls and Walk Daily    Return for AFTER TESTS.       Electronically signed by Ricky Dyson MD on 3/17/2022 at 3:37 PM

## 2022-03-17 NOTE — TELEPHONE ENCOUNTER
[facility] CLINICAL PHARMACY REVIEW: ADHERENCE REVIEW  Identified care gap per [place]; fills at [facility]: Diabetes and Statin adherence      Last Visit: 22    ASSESSMENT  DIABETES ADHERENCE  Per Insurance Records through 22 :   RX:  Metformin 500 mg ER tab  last filled on 22 for 90 day supply. Next refill due: 22. Fail Date:  22. Per Reconciled Dispense Report:  (same as above). Per Pharmacy: [facility]  last picked up on 22 for 90 day supply. Refills remainin  Billed through [employer]   Component Value Date    LABA1C 6.5 2022    LABA1C 7.7 10/05/2021    LABA1C 6.4 2021     NOTE A1c <9%    STATIN ADHERENCE  Per Insurance Records through 22 :   RX:  Simvastatin 40 mg tab  last filled on 22 for 90 day supply. Next refill due: 22. Fail Date:  22. Per Reconciled Dispense Report:  (same as above). Per Pharmacy: [facility]  last picked up on 22 for 90 day supply. Refills remainin  Billed through [employer]   Component Value Date    CHOL 155 2021    TRIG 139 2021    HDL 40 2021    LDLCALC 87 2021     ALT   Date Value Ref Range Status   2021 7 0 - 33 U/L Final     AST   Date Value Ref Range Status   2021 27 0 - 35 U/L Final     The 10-year ASCVD risk score ([address] et al., 2013) is: 23.1%    Values used to calculate the score:      Age: 74 years      Sex: Female      Is Non- : No      Diabetic: Yes      Tobacco smoker: Yes      Systolic Blood Pressure: 758 mmHg      Is BP treated: Yes      HDL Cholesterol: 40 mg/dL      Total Cholesterol: 155 mg/dL     PLAN  The following are interventions that have been identified:     - Patient current with refills. No patient outreached planned at this time.         Future Appointments   Date Time Provider Tran Horne   3/17/2022  3:15 PM Jory Mariano MD [address] [facility]   7/7/2022  9:30 AM MD KWAME Marshall. [facility]  Population Health   [address] [facility] Clinical Pharmacy  Phone: toll free [phone]

## 2022-04-07 ENCOUNTER — HOSPITAL ENCOUNTER (OUTPATIENT)
Dept: NUCLEAR MEDICINE | Age: 67
Discharge: HOME | End: 2022-04-09
Payer: MEDICARE

## 2022-04-07 ENCOUNTER — HOSPITAL ENCOUNTER (OUTPATIENT)
Dept: ULTRASOUND IMAGING | Age: 67
Discharge: HOME | End: 2022-04-09
Payer: MEDICARE

## 2022-04-07 ENCOUNTER — HOSPITAL ENCOUNTER (OUTPATIENT)
Dept: NON INVASIVE DIAGNOSTICS | Age: 67
Discharge: HOME | End: 2022-04-07
Payer: MEDICARE

## 2022-04-07 DIAGNOSIS — R06.09 DOE (DYSPNEA ON EXERTION): ICD-10-CM

## 2022-04-07 DIAGNOSIS — R09.89 BILATERAL CAROTID BRUITS: ICD-10-CM

## 2022-04-07 DIAGNOSIS — G45.9 TIA (TRANSIENT ISCHEMIC ATTACK): ICD-10-CM

## 2022-04-07 LAB
LV EF: 65 %
LV EF: 68 %
LVEF MODALITY: NORMAL
LVEF MODALITY: NORMAL

## 2022-04-07 PROCEDURE — 78452 HT MUSCLE IMAGE SPECT MULT: CPT

## 2022-04-07 PROCEDURE — 93880 EXTRACRANIAL BILAT STUDY: CPT | Performed by: INTERNAL MEDICINE

## 2022-04-07 PROCEDURE — A9502 TC99M TETROFOSMIN: HCPCS | Performed by: INTERNAL MEDICINE

## 2022-04-07 PROCEDURE — 6360000002 HC RX W HCPCS: Performed by: INTERNAL MEDICINE

## 2022-04-07 PROCEDURE — 2580000003 HC RX 258: Performed by: INTERNAL MEDICINE

## 2022-04-07 PROCEDURE — 3430000000 HC RX DIAGNOSTIC RADIOPHARMACEUTICAL: Performed by: INTERNAL MEDICINE

## 2022-04-07 PROCEDURE — 93880 EXTRACRANIAL BILAT STUDY: CPT

## 2022-04-07 PROCEDURE — 93017 CV STRESS TEST TRACING ONLY: CPT

## 2022-04-07 PROCEDURE — 93306 TTE W/DOPPLER COMPLETE: CPT

## 2022-04-07 PROCEDURE — 93018 CV STRESS TEST I&R ONLY: CPT | Performed by: INTERNAL MEDICINE

## 2022-04-07 RX ORDER — SODIUM CHLORIDE 0.9 % (FLUSH) 0.9 %
10 SYRINGE (ML) INJECTION PRN
Status: DISCONTINUED | OUTPATIENT
Start: 2022-04-07 | End: 2022-04-10 | Stop reason: HOSPADM

## 2022-04-07 RX ADMIN — SODIUM CHLORIDE, PRESERVATIVE FREE 10 ML: 5 INJECTION INTRAVENOUS at 09:03

## 2022-04-07 RX ADMIN — REGADENOSON 0.4 MG: 0.08 INJECTION, SOLUTION INTRAVENOUS at 09:02

## 2022-04-07 RX ADMIN — TETROFOSMIN 35.6 MILLICURIE: 1.38 INJECTION, POWDER, LYOPHILIZED, FOR SOLUTION INTRAVENOUS at 09:02

## 2022-04-07 RX ADMIN — TETROFOSMIN 12 MILLICURIE: 1.38 INJECTION, POWDER, LYOPHILIZED, FOR SOLUTION INTRAVENOUS at 07:42

## 2022-04-07 RX ADMIN — SODIUM CHLORIDE, PRESERVATIVE FREE 10 ML: 5 INJECTION INTRAVENOUS at 09:02

## 2022-04-07 RX ADMIN — SODIUM CHLORIDE, PRESERVATIVE FREE 10 ML: 5 INJECTION INTRAVENOUS at 07:42

## 2022-04-08 DIAGNOSIS — E11.9 DIABETES MELLITUS TYPE 2 WITHOUT RETINOPATHY (HCC): ICD-10-CM

## 2022-04-08 RX ORDER — INSULIN GLARGINE 100 [IU]/ML
INJECTION, SOLUTION SUBCUTANEOUS
Qty: 60 ML | Refills: 1 | Status: SHIPPED | OUTPATIENT
Start: 2022-04-08 | End: 2022-09-28

## 2022-04-08 NOTE — TELEPHONE ENCOUNTER
Comments:     Last Office Visit (last PCP visit):   2/7/2022    Next Visit Date:  Future Appointments   Date Time Provider Tran Horne   4/19/2022 11:45 AM Davie Merritt, [address] [place] [address] 15   7/7/2022  9:30 AM Aleida Cody MD [facility]       **If hasn't been seen in over a year OR hasn't followed up according to last diabetes/ADHD visit, make appointment for patient before sending refill to provider.     Rx requested:  Requested Prescriptions     Pending Prescriptions Disp Refills    insulin glargine (LANTUS SOLOSTAR) 100 UNIT/ML injection pen 60 mL 1     Sig: INJECT SUBCUTANEOUSLY 30  UNITS TWICE DAILY

## 2022-04-19 ENCOUNTER — OFFICE VISIT (OUTPATIENT)
Dept: CARDIOLOGY CLINIC | Age: 67
End: 2022-04-19
Payer: MEDICARE

## 2022-04-19 VITALS
WEIGHT: 197.6 LBS | SYSTOLIC BLOOD PRESSURE: 120 MMHG | DIASTOLIC BLOOD PRESSURE: 64 MMHG | HEIGHT: 63 IN | OXYGEN SATURATION: 91 % | HEART RATE: 82 BPM | RESPIRATION RATE: 16 BRPM | BODY MASS INDEX: 35.01 KG/M2

## 2022-04-19 DIAGNOSIS — R06.09 DOE (DYSPNEA ON EXERTION): ICD-10-CM

## 2022-04-19 DIAGNOSIS — E66.01 SEVERE OBESITY (BMI 35.0-39.9) WITH COMORBIDITY (HCC): ICD-10-CM

## 2022-04-19 DIAGNOSIS — G90.A POTS (POSTURAL ORTHOSTATIC TACHYCARDIA SYNDROME): Primary | ICD-10-CM

## 2022-04-19 DIAGNOSIS — G45.9 TIA (TRANSIENT ISCHEMIC ATTACK): ICD-10-CM

## 2022-04-19 PROCEDURE — 3017F COLORECTAL CA SCREEN DOC REV: CPT | Performed by: INTERNAL MEDICINE

## 2022-04-19 PROCEDURE — 1123F ACP DISCUSS/DSCN MKR DOCD: CPT | Performed by: INTERNAL MEDICINE

## 2022-04-19 PROCEDURE — 1090F PRES/ABSN URINE INCON ASSESS: CPT | Performed by: INTERNAL MEDICINE

## 2022-04-19 PROCEDURE — G8427 DOCREV CUR MEDS BY ELIG CLIN: HCPCS | Performed by: INTERNAL MEDICINE

## 2022-04-19 PROCEDURE — 4040F PNEUMOC VAC/ADMIN/RCVD: CPT | Performed by: INTERNAL MEDICINE

## 2022-04-19 PROCEDURE — G8400 PT W/DXA NO RESULTS DOC: HCPCS | Performed by: INTERNAL MEDICINE

## 2022-04-19 PROCEDURE — G8417 CALC BMI ABV UP PARAM F/U: HCPCS | Performed by: INTERNAL MEDICINE

## 2022-04-19 PROCEDURE — 4004F PT TOBACCO SCREEN RCVD TLK: CPT | Performed by: INTERNAL MEDICINE

## 2022-04-19 PROCEDURE — 99214 OFFICE O/P EST MOD 30 MIN: CPT | Performed by: INTERNAL MEDICINE

## 2022-04-19 ASSESSMENT — ENCOUNTER SYMPTOMS
GASTROINTESTINAL NEGATIVE: 1
BLOOD IN STOOL: 0
COUGH: 0
STRIDOR: 0
CHEST TIGHTNESS: 0
RESPIRATORY NEGATIVE: 1
SHORTNESS OF BREATH: 0
EYES NEGATIVE: 1
NAUSEA: 0
WHEEZING: 0

## 2022-04-19 NOTE — PROGRESS NOTES
Subsequent Progress Note  Patient: Oly Garibay  YOB: 1955  MRN: [id number]    Chief Complaint:  Chief Complaint   Patient presents with    Results     Stress, Echo, and CUS. CV Data:  4/22 CUS mild   4/22 spect small apical infarct. 4/22 Echo EF 65    Subjective/HPI: Ahmed pt with POTS. No symtpoms since > 4 yrs. Doing well no cp + sob with exertional activity walks with rico after back surgery    4/19/22 still same HORN no cp no falls no bleed. Lives w   Smoker  No ETOH  Retired - .       EKG: SR 93    Past Medical History:   Diagnosis Date    Arthritis     Cerebral artery occlusion with cerebral infarction [facility]) 2016 June    TIA / sx left sided weakness & fell & unable to get up off floor for several hours    COPD (chronic obstructive pulmonary disease) (HCC)     smoking habit > 40 yrs    Depression     Headache     Hyperlipidemia     meds > 10 yrs    Hypertension     meds since TIA / 6/2016    Neuropathy     POTS (postural orthostatic tachycardia syndrome)     2000's    Restless legs syndrome     Type 2 diabetes mellitus without complication (HCC)     hx > 7 yrs       Past Surgical History:   Procedure Laterality Date    ABDOMINAL EXPLORATION SURGERY  1970s    due to abdominal pain / no definite dx    BACK SURGERY  12/24/2018    [facility]    due to tendonitis    ENDOMETRIAL ABLATION  1990s    AUB    INSERT/ REPLACE INFUSN PUMP,PROGRAMMABLE N/A 5/29/2019    REMOVAL OF INTRATHECAL PUMP RESERVOIR performed by [facility]erson [facility]      pain pump placement 2010 & replacement 2016    TONSILLECTOMY      age 24       Family History   Problem Relation Age of Onset    Stroke Mother     High Blood Pressure Mother     High Cholesterol Mother     Diabetes Father         Pre    Heart Attack Father     Cancer Maternal Grandmother         colon    Cancer Maternal Housing in the Last Year: Not on file    Number of Places Lived in the Last Year: Not on file    Unstable Housing in the Last Year: Not on file       Allergies   Allergen Reactions    Aspirin Swelling    Penicillins Rash       Current Outpatient Medications   Medication Sig Dispense Refill    insulin glargine (LANTUS SOLOSTAR) 100 UNIT/ML injection pen INJECT SUBCUTANEOUSLY 30  UNITS TWICE DAILY 60 mL 1    venlafaxine (EFFEXOR XR) 150 MG extended release capsule TAKE 1 CAPSULE BY MOUTH  DAILY 90 capsule 3    metFORMIN (GLUCOPHAGE-XR) 500 MG extended release tablet TAKE 1 TABLET BY MOUTH  TWICE DAILY 180 tablet 1    clopidogrel (PLAVIX) 75 MG tablet TAKE 1 TABLET BY MOUTH AT  NIGHT 90 tablet 3    Insulin Pen Needle (MEIJER PEN NEEDLES) 31G X 6 MM MISC 1 each by Does not apply route daily 100 each 3    lisinopril (PRINIVIL;ZESTRIL) 10 MG tablet TAKE 1 TABLET BY MOUTH AT  NIGHT 90 tablet 3    simvastatin (ZOCOR) 40 MG tablet TAKE 1 TABLET BY MOUTH AT  NIGHT 90 tablet 2    blood glucose monitor strips Use  strip 11    Lancets MISC 1 each by Does not apply route 3 times daily 100 each 11    meloxicam (MOBIC) 15 MG tablet Take 1 tablet by mouth daily 90 tablet 3    omeprazole (PRILOSEC) 40 MG delayed release capsule Take 1 capsule by mouth daily 90 capsule 3    Lancet Device MISC 1 Device by Does not apply route 4 times daily (before meals and nightly) Test 4x/day. 1 each 0    insulin lispro (HUMALOG KWIKPEN) 200 UNIT/ML SOPN pen INJECT SUBCUTANEOUSLY 3  UNITS FOR EVERY 15 GRAMS OF CARBOHYDRATES; 65-70 UNITS  DAILY 36 mL 3    blood glucose monitor kit and supplies Use TID 1 kit 0    tiZANidine (ZANAFLEX) 4 MG tablet Take 4 mg by mouth nightly  0    acetaminophen-codeine (TYLENOL/CODEINE #4) 300-60 MG per tablet Take 1 tablet by mouth 3 times daily as needed. .       No current facility-administered medications for this visit.        Review of Systems:   Review of Systems   Constitutional: Negative. Negative for diaphoresis and fatigue. HENT: Negative. Eyes: Negative. Respiratory: Negative. Negative for cough, chest tightness, shortness of breath, wheezing and stridor. Cardiovascular: Negative. Negative for chest pain, palpitations and leg swelling. Gastrointestinal: Negative. Negative for blood in stool and nausea. Genitourinary: Negative. Musculoskeletal: Negative. Skin: Negative. Neurological: Negative. Negative for dizziness, syncope, weakness and light-headedness. Hematological: Negative. Psychiatric/Behavioral: Negative. Physical Examination:    /64 (Site: Right Upper Arm, Position: Sitting, Cuff Size: Small Adult)   Pulse 82   Resp 16   Ht 5' 3\" (1.6 m)   Wt 197 lb 9.6 oz (89.6 kg)   LMP 05/24/1999   SpO2 91%   BMI 35.00 kg/m²    Physical Exam   Constitutional: She appears healthy. No distress. HENT:   Normal cephalic and Atraumatic   Eyes: Pupils are equal, round, and reactive to light. Neck: Thyroid normal. No JVD present. No neck adenopathy. No thyromegaly present. Cardiovascular: Normal rate, regular rhythm, normal heart sounds, intact distal pulses and normal pulses. Pulmonary/Chest: Effort normal and breath sounds normal. She has no wheezes. She has no rales. She exhibits no tenderness. Abdominal: Soft. Bowel sounds are normal. There is no abdominal tenderness. Musculoskeletal:         General: No tenderness or edema. Normal range of motion. Cervical back: Normal range of motion and neck supple. Neurological: She is alert and oriented to person, place, and time. Skin: Skin is warm. No cyanosis. Nails show no clubbing.        LABS:  CBC:   Lab Results   Component Value Date    WBC 13.8 05/24/2019    RBC 5.08 05/24/2019    HGB 15.2 05/24/2019    HCT 44.9 05/24/2019    MCV 88.4 05/24/2019    MCH 29.9 05/24/2019    MCHC 33.8 05/24/2019    RDW 17.3 05/24/2019     05/24/2019    MPV 9.8 12/03/2018     Lipids:  Lab Results   Component Value Date    CHOL 155 04/05/2021    CHOL 150 11/06/2018    CHOL 147 01/26/2017     Lab Results   Component Value Date    TRIG 139 04/05/2021    TRIG 151 11/06/2018    TRIG 119 01/26/2017     Lab Results   Component Value Date    HDL 40 04/05/2021    HDL 40 11/06/2018    HDL 35 01/26/2017     Lab Results   Component Value Date    LDLCALC 87 04/05/2021    [facility] 80 11/06/2018    LDLCALC 88 01/26/2017     Lab Results   Component Value Date    VLDL 24 01/26/2017     Lab Results   Component Value Date    CHOLHDLRATIO 4.20 01/26/2017     CMP:    Lab Results   Component Value Date     04/05/2021    K 4.7 04/05/2021     04/05/2021    CO2 24 04/05/2021    BUN 15 04/05/2021    CREATININE 0.67 02/14/2022    GFRAA >60.0 02/14/2022    LABGLOM >60.0 02/14/2022    GLUCOSE 128 04/05/2021    PROT 7.1 04/05/2021    LABALBU 4.0 04/05/2021    CALCIUM 9.1 04/05/2021    BILITOT 0.3 04/05/2021    ALKPHOS 109 04/05/2021    AST 27 04/05/2021    ALT 7 04/05/2021     BMP:    Lab Results   Component Value Date     04/05/2021    K 4.7 04/05/2021     04/05/2021    CO2 24 04/05/2021    BUN 15 04/05/2021    LABALBU 4.0 04/05/2021    CREATININE 0.67 02/14/2022    CALCIUM 9.1 04/05/2021    GFRAA >60.0 02/14/2022    LABGLOM >60.0 02/14/2022    GLUCOSE 128 04/05/2021     Magnesium:  No results found for: MG  TSH:No results found for: TSHFT4, TSH    Patient Active Problem List   Diagnosis    Chronic low back pain    Combined forms of age-related cataract of both eyes    COPD (chronic obstructive pulmonary disease) ([facility]    Dermatochalasis of both upper eyelids    Diabetes mellitus type 2 without retinopathy ([facility]    HTN (hypertension)    Neuropathy, lower extremity    Depression    Drug-induced constipation    POTS (postural orthostatic tachycardia syndrome)    Smoker    Post laminectomy syndrome    TIA (transient ischemic attack)    Morbidly obese (HCC)    Calcification of abdominal aorta ([facility]    Type 2 diabetes mellitus without complication ([facility]    Mass of left side of neck       There are no discontinued medications. Modified Medications    No medications on file       No orders of the defined types were placed in this encounter. Assessment/Plan:    1. POTS (postural orthostatic tachycardia syndrome)   stable     2. Essential hypertension   stable continue meds. Low salt diet     3. TIA (transient ischemic attack)     - US CAROTID ARTERY BILATERAL - mild     4. HORN (dyspnea on exertion)  spect small apical scar. - no CP. But has SOB from COPD likely. If worse come sooner. Stop smoking    5. Severe obesity (BMI 35.0-39. 9) with comorbidity (HCC)   loose weight     6. Bilateral carotid bruits     - US CAROTID ARTERY BILATERAL- mild       Counseling:  Heart Healthy Lifestyle, Improve BMI, Stop Smoking, Low Salt Diet, Take Precautions to Prevent Falls and Walk Daily    Return in about 6 months (around 10/19/2022).       Electronically signed by Ulises White MD on 4/19/2022 at 11:47 AM

## 2022-06-06 ENCOUNTER — COMMUNITY OUTREACH (OUTPATIENT)
Dept: FAMILY MEDICINE CLINIC | Age: 67
End: 2022-06-06

## 2022-06-10 ENCOUNTER — TELEPHONE (OUTPATIENT)
Dept: GASTROENTEROLOGY | Age: 67
End: 2022-06-10

## 2022-07-07 ENCOUNTER — OFFICE VISIT (OUTPATIENT)
Dept: FAMILY MEDICINE CLINIC | Age: 67
End: 2022-07-07
Payer: MEDICARE

## 2022-07-07 ENCOUNTER — TELEPHONE (OUTPATIENT)
Dept: FAMILY MEDICINE CLINIC | Age: 67
End: 2022-07-07

## 2022-07-07 VITALS
OXYGEN SATURATION: 95 % | WEIGHT: 188.4 LBS | TEMPERATURE: 97 F | DIASTOLIC BLOOD PRESSURE: 68 MMHG | HEIGHT: 63 IN | SYSTOLIC BLOOD PRESSURE: 118 MMHG | BODY MASS INDEX: 33.38 KG/M2 | HEART RATE: 85 BPM

## 2022-07-07 DIAGNOSIS — L60.8 NAIL DEFORMITY: ICD-10-CM

## 2022-07-07 DIAGNOSIS — Z00.00 MEDICARE ANNUAL WELLNESS VISIT, SUBSEQUENT: ICD-10-CM

## 2022-07-07 DIAGNOSIS — J43.9 PULMONARY EMPHYSEMA, UNSPECIFIED EMPHYSEMA TYPE (HCC): ICD-10-CM

## 2022-07-07 DIAGNOSIS — M25.511 CHRONIC RIGHT SHOULDER PAIN: ICD-10-CM

## 2022-07-07 DIAGNOSIS — I70.0 CALCIFICATION OF ABDOMINAL AORTA (HCC): ICD-10-CM

## 2022-07-07 DIAGNOSIS — E11.9 DIABETES MELLITUS TYPE 2 WITHOUT RETINOPATHY (HCC): Primary | ICD-10-CM

## 2022-07-07 DIAGNOSIS — Z87.891 PERSONAL HISTORY OF TOBACCO USE: ICD-10-CM

## 2022-07-07 DIAGNOSIS — G89.29 CHRONIC RIGHT SHOULDER PAIN: ICD-10-CM

## 2022-07-07 LAB — HBA1C MFR BLD: 5.8 %

## 2022-07-07 PROCEDURE — G0439 PPPS, SUBSEQ VISIT: HCPCS | Performed by: FAMILY MEDICINE

## 2022-07-07 PROCEDURE — 3023F SPIROM DOC REV: CPT | Performed by: FAMILY MEDICINE

## 2022-07-07 PROCEDURE — 99214 OFFICE O/P EST MOD 30 MIN: CPT | Performed by: FAMILY MEDICINE

## 2022-07-07 PROCEDURE — G8417 CALC BMI ABV UP PARAM F/U: HCPCS | Performed by: FAMILY MEDICINE

## 2022-07-07 PROCEDURE — 2022F DILAT RTA XM EVC RTNOPTHY: CPT | Performed by: FAMILY MEDICINE

## 2022-07-07 PROCEDURE — G8400 PT W/DXA NO RESULTS DOC: HCPCS | Performed by: FAMILY MEDICINE

## 2022-07-07 PROCEDURE — 83036 HEMOGLOBIN GLYCOSYLATED A1C: CPT | Performed by: FAMILY MEDICINE

## 2022-07-07 PROCEDURE — 3044F HG A1C LEVEL LT 7.0%: CPT | Performed by: FAMILY MEDICINE

## 2022-07-07 PROCEDURE — 4004F PT TOBACCO SCREEN RCVD TLK: CPT | Performed by: FAMILY MEDICINE

## 2022-07-07 PROCEDURE — 1123F ACP DISCUSS/DSCN MKR DOCD: CPT | Performed by: FAMILY MEDICINE

## 2022-07-07 PROCEDURE — G8427 DOCREV CUR MEDS BY ELIG CLIN: HCPCS | Performed by: FAMILY MEDICINE

## 2022-07-07 PROCEDURE — 1090F PRES/ABSN URINE INCON ASSESS: CPT | Performed by: FAMILY MEDICINE

## 2022-07-07 PROCEDURE — G0296 VISIT TO DETERM LDCT ELIG: HCPCS | Performed by: FAMILY MEDICINE

## 2022-07-07 PROCEDURE — 3017F COLORECTAL CA SCREEN DOC REV: CPT | Performed by: FAMILY MEDICINE

## 2022-07-07 RX ORDER — METHYLPREDNISOLONE 4 MG/1
TABLET ORAL
Qty: 1 KIT | Refills: 0 | Status: SHIPPED | OUTPATIENT
Start: 2022-07-07 | End: 2022-07-13

## 2022-07-07 ASSESSMENT — ENCOUNTER SYMPTOMS
DIARRHEA: 0
ABDOMINAL PAIN: 0
RHINORRHEA: 0
SHORTNESS OF BREATH: 0
SORE THROAT: 0
CONSTIPATION: 0
WHEEZING: 0
COUGH: 0

## 2022-07-07 ASSESSMENT — LIFESTYLE VARIABLES
HOW OFTEN DO YOU HAVE A DRINK CONTAINING ALCOHOL: NEVER
HOW MANY STANDARD DRINKS CONTAINING ALCOHOL DO YOU HAVE ON A TYPICAL DAY: PATIENT DECLINED

## 2022-07-07 ASSESSMENT — PATIENT HEALTH QUESTIONNAIRE - PHQ9
SUM OF ALL RESPONSES TO PHQ QUESTIONS 1-9: 8
7. TROUBLE CONCENTRATING ON THINGS, SUCH AS READING THE NEWSPAPER OR WATCHING TELEVISION: 0
9. THOUGHTS THAT YOU WOULD BE BETTER OFF DEAD, OR OF HURTING YOURSELF: 0
3. TROUBLE FALLING OR STAYING ASLEEP: 3
2. FEELING DOWN, DEPRESSED OR HOPELESS: 2
10. IF YOU CHECKED OFF ANY PROBLEMS, HOW DIFFICULT HAVE THESE PROBLEMS MADE IT FOR YOU TO DO YOUR WORK, TAKE CARE OF THINGS AT HOME, OR GET ALONG WITH OTHER PEOPLE: 1
SUM OF ALL RESPONSES TO PHQ9 QUESTIONS 1 & 2: 4
1. LITTLE INTEREST OR PLEASURE IN DOING THINGS: 2
4. FEELING TIRED OR HAVING LITTLE ENERGY: 1
SUM OF ALL RESPONSES TO PHQ QUESTIONS 1-9: 8
5. POOR APPETITE OR OVEREATING: 0
SUM OF ALL RESPONSES TO PHQ9 QUESTIONS 1 & 2: 4
1. LITTLE INTEREST OR PLEASURE IN DOING THINGS: 2
6. FEELING BAD ABOUT YOURSELF - OR THAT YOU ARE A FAILURE OR HAVE LET YOURSELF OR YOUR FAMILY DOWN: 0
SUM OF ALL RESPONSES TO PHQ QUESTIONS 1-9: 4
SUM OF ALL RESPONSES TO PHQ QUESTIONS 1-9: 8
SUM OF ALL RESPONSES TO PHQ QUESTIONS 1-9: 4
2. FEELING DOWN, DEPRESSED OR HOPELESS: 2
SUM OF ALL RESPONSES TO PHQ QUESTIONS 1-9: 4
8. MOVING OR SPEAKING SO SLOWLY THAT OTHER PEOPLE COULD HAVE NOTICED. OR THE OPPOSITE, BEING SO FIGETY OR RESTLESS THAT YOU HAVE BEEN MOVING AROUND A LOT MORE THAN USUAL: 0
SUM OF ALL RESPONSES TO PHQ QUESTIONS 1-9: 8
SUM OF ALL RESPONSES TO PHQ QUESTIONS 1-9: 4

## 2022-07-07 NOTE — PATIENT INSTRUCTIONS
What is lung cancer screening? Lung cancer screening is a way in which doctors check the lungs for early signs of cancer in people who have no symptoms of lung cancer. A low-dose CT scan uses much less radiation than a normal CT scan and shows a more detailed image of the lungs than a standard X-ray. The goal of lung cancer screening is to find cancer early, before it has a chance to grow, spread, or cause problems. One large study found that smokers who were screened with low-dose CT scans were less likely to die of lung cancer than those who were screened with standard X-ray. Below is a summary of the things you need to know regarding screening for lung cancer with low-dose computed tomography (LDCT). This is a screening program that involves routine annual screening with LDCT studies of the lung. The LDCTs are done using low-dose radiation that is not thought to increase your cancer risk. If you have other serious medical conditions (other cancers, congestive heart failure) that limit your life expectancy to less than 10 years, you should not undergo lung cancer screening with LDCT. The chance is 20%-60% that the LDCT result will show abnormalities. This would require additional testing which could include repeat imaging or even invasive procedures. Most (about 95%) of \"abnormal\" LDCT results are false in the sense that no lung cancer is ultimately found. Additionally, some (about 10%) of the cancers found would not affect your life expectancy, even if undetected and untreated. If you are still smoking, the single most important thing that you can do to reduce your risk of dying of lung cancer is to quit. For this screening to be covered by Medicare and most other insurers, strict criteria must be met. If you do not meet these criteria, but still wish to undergo LDCT testing, you will be required to sign a waiver indicating your willingness to pay for the scan.   Personalized Preventive Plan

## 2022-07-07 NOTE — PROGRESS NOTES
[address] [facility] 1840 [facility] PRIMARY CARE  [address] [address]  Dept: [phone]  Dept Fax: [phone]  Loc: [phone]     Chief Complaint  Chief Complaint   Patient presents with    Medicare AW    Diabetes     6 month follow up    Neck Pain     stiffness, and right arm pain       HPI:  74 y. o.female who presents for the following:      DM2: a1c 5.8 from 6.5; compliant with meds; compliant with diet; No excessive thirst, urination, or blurry vision. Current diabetes regimen:   - Metformin  - Lantus 28 BID (decrease to 24 BID)  - Lispro 6u/15g carb(usual 12-20u TID AC; hasn't been taking due to risk of low sugar)    Neck pain: R neck feeling stiff for a few months along with the R shoulder; aching; doesn't keep her up at night         Review of Systems   Constitutional: Negative for chills and fever. HENT: Negative for congestion, rhinorrhea and sore throat. Respiratory: Negative for cough, shortness of breath and wheezing. Gastrointestinal: Negative for abdominal pain, constipation and diarrhea. Endocrine: Negative for polydipsia and polyuria. Genitourinary: Negative for dysuria, frequency and urgency. Musculoskeletal: Positive for arthralgias and neck pain. Neurological: Negative for syncope, light-headedness, numbness and headaches. Psychiatric/Behavioral: Negative for sleep disturbance. The patient is not nervous/anxious.         Past Medical History:   Diagnosis Date    Arthritis     Cerebral artery occlusion with cerebral infarction [facility]) 2016 June    TIA / sx left sided weakness & fell & unable to get up off floor for several hours    COPD (chronic obstructive pulmonary disease) ([facility] [address]     smoking habit > 40 yrs    Depression     Headache     Hyperlipidemia     meds > 10 yrs    Hypertension     meds since TIA / 6/2016    Neuropathy     POTS (postural orthostatic tachycardia syndrome)     2000's  Restless legs syndrome     Type 2 diabetes mellitus without complication (HCC)     hx > 7 yrs     Past Surgical History:   Procedure Laterality Date    ABDOMINAL EXPLORATION SURGERY  1970s    due to abdominal pain / no definite dx    BACK SURGERY  12/24/2018    lumbar disc OR [address] Right 1989    due to tendonitis    ENDOMETRIAL ABLATION  1990s    AUB    INSERT/ REPLACE INFUSN PUMP,PROGRAMMABLE N/A 05/29/2019    REMOVAL OF INTRATHECAL PUMP RESERVOIR performed by Trent Nicole MD at [address]      pain pump placement 2010 & replacement 2016    SKIN BIOPSY  03/2022    on a cyst on neck(benign)    TONSILLECTOMY      age 24     Social History     Socioeconomic History    Marital status:      Spouse name: Not on file    Number of children: Not on file    Years of education: Not on file    Highest education level: Not on file   Occupational History    Not on file   Tobacco Use    Smoking status: Current Every Day Smoker     Packs/day: 1.00     Years: 44.00     Pack years: 44.00     Types: Cigarettes     Start date: 1975    Smokeless tobacco: Never Used   Substance and Sexual Activity    Alcohol use: No    Drug use: No    Sexual activity: Not on file   Other Topics Concern    Not on file   Social History Narrative    Not on file     Social Determinants of Health     Financial Resource Strain: Low Risk     Difficulty of Paying Living Expenses: Not hard at all   Food Insecurity: No Food Insecurity    Worried About [address] in the Last Year: Never true    [facility] in the Last Year: Never true   Transportation Needs:     Lack of Transportation (Medical): Not on file    Lack of Transportation (Non-Medical):  Not on file   Physical Activity: Inactive    Days of Exercise per Week: 0 days    Minutes of Exercise per Session: 0 min   Stress:     Feeling of Stress : Not on file   Social Connections:     Frequency of Communication with Friends and Family: Not on file    Frequency of Social Gatherings with Friends and Family: Not on file    Attends Orthodoxy Services: Not on file    Active Member of Clubs or Organizations: Not on file    Attends Club or Organization Meetings: Not on file    Marital Status: Not on file   Intimate Partner Violence:     Fear of Current or Ex-Partner: Not on file    Emotionally Abused: Not on file    Physically Abused: Not on file    Sexually Abused: Not on file   Housing Stability:     Unable to Pay for Housing in the Last Year: Not on file    Number of [place] in the Last Year: Not on file    Unstable Housing in the Last Year: Not on file     Family History   Problem Relation Age of Onset    Stroke Mother     High Blood Pressure Mother     High Cholesterol Mother     Diabetes Father         Pre    Heart Attack Father     Cancer Maternal Grandmother         colon    Cancer Maternal Grandfather     Alzheimer's Disease Paternal Grandmother     Alzheimer's Disease Paternal Grandfather     Diabetes Paternal Grandfather     COPD Sister       Allergies   Allergen Reactions    Aspirin Swelling    Penicillins Rash     Current Outpatient Medications   Medication Sig Dispense Refill    methylPREDNISolone (MEDROL DOSEPACK) 4 MG tablet Take by mouth.  1 kit 0    insulin glargine (LANTUS SOLOSTAR) 100 UNIT/ML injection pen INJECT SUBCUTANEOUSLY 30  UNITS TWICE DAILY 60 mL 1    venlafaxine (EFFEXOR XR) 150 MG extended release capsule TAKE 1 CAPSULE BY MOUTH  DAILY 90 capsule 3    metFORMIN (GLUCOPHAGE-XR) 500 MG extended release tablet TAKE 1 TABLET BY MOUTH  TWICE DAILY 180 tablet 1    clopidogrel (PLAVIX) 75 MG tablet TAKE 1 TABLET BY MOUTH AT  NIGHT 90 tablet 3    Insulin Pen Needle (MEIJER PEN NEEDLES) 31G X 6 MM MISC 1 each by Does not apply route daily 100 each 3    lisinopril (PRINIVIL;ZESTRIL) 10 MG tablet TAKE 1 TABLET BY MOUTH AT  NIGHT 90 tablet 3    simvastatin (ZOCOR) 40 MG tablet TAKE 1 TABLET BY MOUTH AT  NIGHT 90 tablet 2    blood glucose monitor strips Use  strip 11    Lancets MISC 1 each by Does not apply route 3 times daily 100 each 11    meloxicam (MOBIC) 15 MG tablet Take 1 tablet by mouth daily 90 tablet 3    omeprazole (PRILOSEC) 40 MG delayed release capsule Take 1 capsule by mouth daily 90 capsule 3    Lancet Device MISC 1 Device by Does not apply route 4 times daily (before meals and nightly) Test 4x/day. 1 each 0    insulin lispro (HUMALOG KWIKPEN) 200 UNIT/ML SOPN pen INJECT SUBCUTANEOUSLY 3  UNITS FOR EVERY 15 GRAMS OF CARBOHYDRATES; 65-70 UNITS  DAILY 36 mL 3    blood glucose monitor kit and supplies Use TID 1 kit 0    tiZANidine (ZANAFLEX) 4 MG tablet Take 4 mg by mouth nightly  0    acetaminophen-codeine (TYLENOL/CODEINE #4) 300-60 MG per tablet Take 1 tablet by mouth 3 times daily as needed. .       No current facility-administered medications for this visit. Vitals:    07/07/22 0928   BP: 118/68   Site: Left Upper Arm   Position: Sitting   Cuff Size: Medium Adult   Pulse: 85   Temp: 97 °F (36.1 °C)   TempSrc: Infrared   SpO2: 95%   Weight: 188 lb 6.4 oz (85.5 kg)   Height: 5' 3\" (1.6 m)       Physical exam:  Physical Exam  Vitals reviewed. Constitutional:       General: She is not in acute distress. Appearance: She is well-developed. HENT:      Head: Normocephalic and atraumatic. Mouth/Throat:      Pharynx: No oropharyngeal exudate. Neck:      Thyroid: No thyromegaly. Cardiovascular:      Rate and Rhythm: Normal rate and regular rhythm. Heart sounds: Normal heart sounds. No murmur heard. Pulmonary:      Effort: Pulmonary effort is normal. No respiratory distress. Breath sounds: Normal breath sounds. No wheezing. Abdominal:      General: There is no distension. Palpations: Abdomen is soft. Tenderness: There is no abdominal tenderness. There is no guarding or rebound.    Musculoskeletal: Cervical back: Normal range of motion. Lymphadenopathy:      Cervical: No cervical adenopathy. Skin:     General: Skin is warm and dry. Neurological:      Mental Status: She is alert and oriented to person, place, and time. Psychiatric:         Behavior: Behavior normal.         Assessment/Plan:  74 y.o. female here mainly for DM2:  - DM2: at goal but a little too low; decrease lantus  - Neck shoulder pain: trial of steroid and will check xray  - routine labs and screenings      Diagnosis Orders   1. Diabetes mellitus type 2 without retinopathy ([address]  POCT glycosylated hemoglobin (Hb A1C)     DIABETES FOOT EXAM   2. Personal history of tobacco use  IL VISIT TO DISCUSS LUNG CA SCREEN W LDCT    CT Lung Screen (Annual)   3. Medicare annual wellness visit, subsequent  Lipid, Fasting    Comprehensive Metabolic Panel, Fasting   4. Pulmonary emphysema, unspecified emphysema type ([address]     5. Calcification of abdominal aorta ([address]     6. Nail deformity  Amb External Referral To Podiatry   7. Chronic right shoulder pain  XR SHOULDER RIGHT (MIN 2 VIEWS)    methylPREDNISolone (MEDROL DOSEPACK) 4 MG tablet        Return in 6 months (on 1/7/2023) for DM2. Daniela Barry MD   Medicare Annual Wellness Visit    Jared Frey is here for Medicare AWV, Diabetes (6 month follow up), and Neck Pain (stiffness, and right arm pain)    Assessment & Plan   Diabetes mellitus type 2 without retinopathy (HCC)  -     POCT glycosylated hemoglobin (Hb A1C)  -      DIABETES FOOT EXAM  Personal history of tobacco use  -     [place] VISIT TO DISCUSS LUNG CA SCREEN W LDCT  -     CT Lung Screen (Annual); Future  Medicare annual wellness visit, subsequent  -     Lipid, Fasting; Future  -     Comprehensive Metabolic Panel, Fasting;  Future  Pulmonary emphysema, unspecified emphysema type ([address]  Assessment & Plan:   Well-controlled, continue current medications    Calcification of abdominal aorta ([address]  Assessment & Plan: Well-controlled, continue current medications    Nail deformity  -     Amb External Referral To Podiatry  Chronic right shoulder pain  -     XR SHOULDER RIGHT (MIN 2 VIEWS); Future  -     methylPREDNISolone (MEDROL DOSEPACK) 4 MG tablet; Take by mouth., Disp-1 kit, R-0Normal      Recommendations for Preventive Services Due: see orders and patient instructions/AVS.  Recommended screening schedule for the next 5-10 years is provided to the patient in written form: see Patient Instructions/AVS.     Return in 6 months (on 1/7/2023) for DM2. Subjective       Patient's complete Health Risk Assessment and screening values have been reviewed and are found in Flowsheets. The following problems were reviewed today and where indicated follow up appointments were made and/or referrals ordered.     Positive Risk Factor Screenings with Interventions:    Fall Risk:  Do you feel unsteady or are you worried about falling? : (!) yes  2 or more falls in past year?: no  Fall with injury in past year?: no     Fall Risk Interventions:    · Home safety tips provided     Depression:  PHQ-2 Score: 4  PHQ-9 Total Score: 8    Severity:1-4 = minimal depression, 5-9 = mild depression, 10-14 = moderate depression, 15-19 = moderately severe depression, 20-27 = severe depression    Depression Interventions:  · Patient declines any further evaluation/treatment for this issue    Tobacco Use:     Tobacco Use: High Risk    Smoking Tobacco Use: Current Every Day Smoker    Smokeless Tobacco Use: Never Used     E-cigarette/Vaping     Questions Responses    E-cigarette/Vaping Use     Start Date     Passive Exposure     Quit Date     Counseling Given     Comments         Substance Use - Tobacco Interventions:  patient is not ready to work toward tobacco cessation at this time           General Health and ACP:  General  In general, how would you say your health is?: (!) Poor  In the past 7 days, have you experienced any of the following: New or Increased Pain, New or Increased Fatigue, Loneliness, Social Isolation, Stress or Anger?: (!) Yes  Select all that apply: (!) New or Increased Pain  Do you get the social and emotional support that you need?: Yes  Do you have a Living Will?: Yes    Advance Directives     Power of  Living Will ACP-Advance Directive ACP-Power of     Not on File Not on File Not on File Not on File      General Health Risk Interventions:  · hx of anxiety    Health Habits/Nutrition:     Physical Activity: Inactive    Days of Exercise per Week: 0 days    Minutes of Exercise per Session: 0 min     Have you lost any weight without trying in the past 3 months?: No  Body mass index: (!) 33.37  Have you seen the dentist within the past year?: N/A - wear dentures    Health Habits/Nutrition Interventions:  · Nutritional issues:  patient is not ready to address his/her nutritional/weight issues at this time    Hearing/Vision:  Do you or your family notice any trouble with your hearing that hasn't been managed with hearing aids?: No  Do you have difficulty driving, watching TV, or doing any of your daily activities because of your eyesight?: No  Have you had an eye exam within the past year?: (!) No  No exam data present    Hearing/Vision Interventions:  · Vision concerns:  patient encouraged to make appointment with his/her eye specialist    Safety:  Do you have working smoke detectors?: Yes  Do you have any tripping hazards - loose or unsecured carpets or rugs?: No  Do you have any tripping hazards - clutter in doorways, halls, or stairs?: No  Do you have either shower bars, grab bars, non-slip mats or non-slip surfaces in your shower or bathtub?: Yes  Do all of your stairways have a railing or banister?: (!) No  Do you always fasten your seatbelt when you are in a car?: Yes    Safety Interventions:  · Home safety tips provided    ADLs:  In the past 7 days, did you need help from others to perform any of the following everyday Mirna Tim MD   omeprazole (PRILOSEC) 40 MG delayed release capsule Take 1 capsule by mouth daily Yes Mirna Tim MD   Lancet Device MISC 1 Device by Does not apply route 4 times daily (before meals and nightly) Test 4x/day. Yes Mirna Tim MD   insulin lispro (HUMALOG KWIKPEN) 200 UNIT/ML SOPN pen INJECT SUBCUTANEOUSLY 3  UNITS FOR EVERY 15 GRAMS OF CARBOHYDRATES; 65-70 UNITS  DAILY Yes Mirna Tim MD   blood glucose monitor kit and supplies Use TID Yes Mirna Tim MD   tiZANidine (ZANAFLEX) 4 MG tablet Take 4 mg by mouth nightly Yes Historical Provider, MD   acetaminophen-codeine (TYLENOL/CODEINE #4) 300-60 MG per tablet Take 1 tablet by mouth 3 times daily as needed. . Yes Historical Provider, MD Gonzáles (Including outside providers/suppliers regularly involved in providing care):   Patient Care Team:  Mirna Tim MD as PCP - General (Family Medicine)  Mirna Tim MD as PCP - [facility] Empaneled Provider  Warner Diaz MD as Cardiologist (Cardiology)     Reviewed and updated this visit:  Tobacco  Allergies  Meds  Med Hx  Surg Hx  Soc Hx  Fam Hx

## 2022-07-11 ENCOUNTER — HOSPITAL ENCOUNTER (OUTPATIENT)
Dept: GENERAL RADIOLOGY | Age: 67
Discharge: HOME | End: 2022-07-13
Payer: MEDICARE

## 2022-07-11 ENCOUNTER — HOSPITAL ENCOUNTER (OUTPATIENT)
Age: 67
Discharge: HOME | End: 2022-07-13
Payer: MEDICARE

## 2022-07-11 DIAGNOSIS — G89.29 CHRONIC RIGHT SHOULDER PAIN: ICD-10-CM

## 2022-07-11 DIAGNOSIS — M25.511 CHRONIC RIGHT SHOULDER PAIN: ICD-10-CM

## 2022-07-11 PROCEDURE — 73030 X-RAY EXAM OF SHOULDER: CPT

## 2022-07-12 DIAGNOSIS — G89.29 CHRONIC RIGHT SHOULDER PAIN: Primary | ICD-10-CM

## 2022-07-12 DIAGNOSIS — M25.511 CHRONIC RIGHT SHOULDER PAIN: Primary | ICD-10-CM

## 2022-07-12 NOTE — TELEPHONE ENCOUNTER
Pt aware she'll need to contact insurance and then call us back with what providers they cover for her podiatry referral.

## 2022-07-15 ENCOUNTER — NURSE ONLY (OUTPATIENT)
Dept: FAMILY MEDICINE CLINIC | Age: 67
End: 2022-07-15
Payer: MEDICARE

## 2022-07-15 ENCOUNTER — HOSPITAL ENCOUNTER (OUTPATIENT)
Age: 67
Setting detail: SPECIMEN
Discharge: HOME | End: 2022-07-15
Payer: MEDICARE

## 2022-07-15 DIAGNOSIS — Z00.00 ENCOUNTER FOR MEDICARE ANNUAL WELLNESS EXAM: Primary | ICD-10-CM

## 2022-07-15 DIAGNOSIS — Z00.00 MEDICARE ANNUAL WELLNESS VISIT, SUBSEQUENT: ICD-10-CM

## 2022-07-15 LAB
ALBUMIN SERPL-MCNC: 4.1 G/DL (ref 3.5–4.6)
ALP BLD-CCNC: 103 U/L (ref 40–130)
ALT SERPL-CCNC: <5 U/L (ref 0–33)
ANION GAP SERPL CALCULATED.3IONS-SCNC: 11 MEQ/L (ref 9–15)
AST SERPL-CCNC: 20 U/L (ref 0–35)
BILIRUB SERPL-MCNC: 0.4 MG/DL (ref 0.2–0.7)
BUN BLDV-MCNC: 12 MG/DL (ref 8–23)
CALCIUM SERPL-MCNC: 9.5 MG/DL (ref 8.5–9.9)
CHLORIDE BLD-SCNC: 99 MEQ/L (ref 95–107)
CHOLESTEROL, FASTING: 182 MG/DL (ref 0–199)
CO2: 32 MEQ/L (ref 20–31)
CREAT SERPL-MCNC: 0.87 MG/DL (ref 0.5–0.9)
GFR AFRICAN AMERICAN: >60
GFR NON-AFRICAN AMERICAN: >60
GLOBULIN: 3.2 G/DL (ref 2.3–3.5)
GLUCOSE FASTING: 101 MG/DL (ref 70–99)
HDLC SERPL-MCNC: 58 MG/DL (ref 40–59)
LDL CHOLESTEROL CALCULATED: 99 MG/DL (ref 0–129)
POTASSIUM SERPL-SCNC: 5.5 MEQ/L (ref 3.4–4.9)
SODIUM BLD-SCNC: 142 MEQ/L (ref 135–144)
TOTAL PROTEIN: 7.3 G/DL (ref 6.3–8)
TRIGLYCERIDE, FASTING: 123 MG/DL (ref 0–150)

## 2022-07-15 PROCEDURE — 80061 LIPID PANEL: CPT

## 2022-07-15 PROCEDURE — 36415 COLL VENOUS BLD VENIPUNCTURE: CPT | Performed by: FAMILY MEDICINE

## 2022-07-15 PROCEDURE — 80053 COMPREHEN METABOLIC PANEL: CPT

## 2022-07-15 NOTE — PROGRESS NOTES
Patient here for fasting lab work. Patient tolerated procedure well. Patient was advised we would reach out regarding lab results once they returned.

## 2022-07-18 ENCOUNTER — HOSPITAL ENCOUNTER (OUTPATIENT)
Dept: CT IMAGING | Age: 67
Discharge: HOME | End: 2022-07-20
Payer: MEDICARE

## 2022-07-18 DIAGNOSIS — Z87.891 PERSONAL HISTORY OF TOBACCO USE: ICD-10-CM

## 2022-07-18 DIAGNOSIS — Z87.891 PERSONAL HISTORY OF TOBACCO USE: Primary | ICD-10-CM

## 2022-07-18 PROCEDURE — 71271 CT THORAX LUNG CANCER SCR C-: CPT

## 2022-07-26 ENCOUNTER — TELEPHONE (OUTPATIENT)
Dept: FAMILY MEDICINE CLINIC | Age: 67
End: 2022-07-26

## 2022-07-26 DIAGNOSIS — L60.8 NAIL DEFORMITY: Primary | ICD-10-CM

## 2022-07-26 NOTE — TELEPHONE ENCOUNTER
Patient asking for an external referral to Podiatry. [facility], [facility] for [facility].  Pending for approval.

## 2022-07-26 NOTE — TELEPHONE ENCOUNTER
----- Message from Marycarmen Rick sent at 7/26/2022  9:35 AM EDT -----  Subject: Referral Request    Reason for referral request? Referral for Podiatry - Nail Deformity  Provider patient wants to be referred to(if known):     Provider Phone Number(if known):[phone]    Additional Information for Provider? Original referral to Denny Vasquez   on 7/7 at [facility] is not in network with patient insurance.  So she would   rather go to [facility]  ---------------------------------------------------------------------------  --------------  [address] [facility]    [phone]; OK to leave message on voicemail  ---------------------------------------------------------------------------  --------------

## 2022-08-04 ENCOUNTER — TELEPHONE (OUTPATIENT)
Dept: GASTROENTEROLOGY | Age: 67
End: 2022-08-04

## 2022-08-15 ENCOUNTER — OFFICE VISIT (OUTPATIENT)
Dept: ORTHOPEDIC SURGERY | Age: 67
End: 2022-08-15
Payer: MEDICARE

## 2022-08-15 ENCOUNTER — HOSPITAL ENCOUNTER (OUTPATIENT)
Dept: GENERAL RADIOLOGY | Age: 67
Discharge: HOME | End: 2022-08-17
Payer: MEDICARE

## 2022-08-15 VITALS
TEMPERATURE: 97 F | WEIGHT: 188 LBS | BODY MASS INDEX: 33.31 KG/M2 | HEIGHT: 63 IN | HEART RATE: 92 BPM | OXYGEN SATURATION: 85 %

## 2022-08-15 DIAGNOSIS — M54.12 RADICULAR PAIN OF SHOULDER: ICD-10-CM

## 2022-08-15 DIAGNOSIS — M54.12 CERVICAL RADICULOPATHY AT C5: Primary | ICD-10-CM

## 2022-08-15 PROCEDURE — 99204 OFFICE O/P NEW MOD 45 MIN: CPT | Performed by: ORTHOPAEDIC SURGERY

## 2022-08-15 PROCEDURE — 1123F ACP DISCUSS/DSCN MKR DOCD: CPT | Performed by: ORTHOPAEDIC SURGERY

## 2022-08-15 PROCEDURE — 72050 X-RAY EXAM NECK SPINE 4/5VWS: CPT

## 2022-08-15 RX ORDER — DEXAMETHASONE 0.5 MG/1
0.5 TABLET ORAL EVERY 6 HOURS
Qty: 40 TABLET | Refills: 0 | Status: SHIPPED | OUTPATIENT
Start: 2022-08-15 | End: 2022-08-25

## 2022-08-15 NOTE — PROGRESS NOTES
Subjective:      Patient ID: David Lopez is a 74 y.o. female who presents today for:  Chief Complaint   Patient presents with    New Patient     Right shoulder Pain xray 7/7/22       HPI  Patient states that the pain she is experiencing is not in her right shoulder but instead radiating down into the upper arm region. Describes it as a dull constant ache with associated burning sensation on occasion. Denies any overt numbness or tingling of the right upper extremity.     Past Medical History:   Diagnosis Date    Arthritis     Cerebral artery occlusion with cerebral infarction [facility]) 2016 June    TIA / sx left sided weakness & fell & unable to get up off floor for several hours    COPD (chronic obstructive pulmonary disease) ([facility] [address]     smoking habit > 40 yrs    Depression     Headache     Hyperlipidemia     meds > 10 yrs    Hypertension     meds since TIA / 6/2016    Neuropathy     POTS (postural orthostatic tachycardia syndrome)     2000's    Restless legs syndrome     Type 2 diabetes mellitus without complication (HCC)     hx > 7 yrs      Past Surgical History:   Procedure Laterality Date    ABDOMINAL EXPLORATION SURGERY  1970s    due to abdominal pain / no definite dx    BACK SURGERY  12/24/2018    lumbar disc OR at [address]    due to tendonitis    ENDOMETRIAL ABLATION  1990s    AUB    INSERT/ REPLACE INFUSN PUMP,PROGRAMMABLE N/A 05/29/2019    REMOVAL OF INTRATHECAL PUMP RESERVOIR performed by Denise Woodall MD at [address] [facility]      pain pump placement 2010 & replacement 2016    SKIN BIOPSY  03/2022    on a cyst on neck(benign)    TONSILLECTOMY      age 24     Social History     Socioeconomic History    Marital status:      Spouse name: Not on file    Number of children: Not on file    Years of education: Not on file    Highest education level: Not on file   Occupational History    Not on file   Tobacco Use    Smoking status: Every Day Packs/day: 1.00     Years: 44.00     Pack years: 44.00     Types: Cigarettes     Start date: 1975    Smokeless tobacco: Never   Substance and Sexual Activity    Alcohol use: No    Drug use: No    Sexual activity: Not on file   Other Topics Concern    Not on file   Social History Narrative    Not on file     Social Determinants of Health     Financial Resource Strain: Low Risk     Difficulty of Paying Living Expenses: Not hard at all   Food Insecurity: No Food Insecurity    Worried About Running Out of Food in the Last Year: Never true    Ran Out of Food in the Last Year: Never true   Transportation Needs: Not on file   Physical Activity: Inactive    Days of Exercise per Week: 0 days    Minutes of Exercise per Session: 0 min   Stress: Not on file   Social Connections: Not on file   Intimate Partner Violence: Not on file   Housing Stability: Not on file     Family History   Problem Relation Age of Onset    Stroke Mother     High Blood Pressure Mother     High Cholesterol Mother     Diabetes Father         Pre    Heart Attack Father     Cancer Maternal Grandmother         colon    Cancer Maternal Grandfather     Alzheimer's Disease Paternal Grandmother     Alzheimer's Disease Paternal Grandfather     Diabetes Paternal Grandfather     COPD Sister      Allergies   Allergen Reactions    Aspirin Swelling    Penicillins Rash     Current Outpatient Medications on File Prior to Visit   Medication Sig Dispense Refill    insulin glargine (LANTUS SOLOSTAR) 100 UNIT/ML injection pen INJECT SUBCUTANEOUSLY 30  UNITS TWICE DAILY 60 mL 1    venlafaxine (EFFEXOR XR) 150 MG extended release capsule TAKE 1 CAPSULE BY MOUTH  DAILY 90 capsule 3    metFORMIN (GLUCOPHAGE-XR) 500 MG extended release tablet TAKE 1 TABLET BY MOUTH  TWICE DAILY 180 tablet 1    clopidogrel (PLAVIX) 75 MG tablet TAKE 1 TABLET BY MOUTH AT  NIGHT 90 tablet 3    Insulin Pen Needle (MEIJER PEN NEEDLES) 31G X 6 MM MISC 1 each by Does not apply route daily 100 each 3 lisinopril (PRINIVIL;ZESTRIL) 10 MG tablet TAKE 1 TABLET BY MOUTH AT  NIGHT 90 tablet 3    simvastatin (ZOCOR) 40 MG tablet TAKE 1 TABLET BY MOUTH AT  NIGHT 90 tablet 2    blood glucose monitor strips Use  strip 11    Lancets MISC 1 each by Does not apply route 3 times daily 100 each 11    meloxicam (MOBIC) 15 MG tablet Take 1 tablet by mouth daily 90 tablet 3    omeprazole (PRILOSEC) 40 MG delayed release capsule Take 1 capsule by mouth daily 90 capsule 3    Lancet Device MISC 1 Device by Does not apply route 4 times daily (before meals and nightly) Test 4x/day. 1 each 0    insulin lispro (HUMALOG KWIKPEN) 200 UNIT/ML SOPN pen INJECT SUBCUTANEOUSLY 3  UNITS FOR EVERY 15 GRAMS OF CARBOHYDRATES; 65-70 UNITS  DAILY 36 mL 3    blood glucose monitor kit and supplies Use TID 1 kit 0    tiZANidine (ZANAFLEX) 4 MG tablet Take 4 mg by mouth nightly  0    acetaminophen-codeine (TYLENOL/CODEINE #4) 300-60 MG per tablet Take 1 tablet by mouth 3 times daily as needed. .       No current facility-administered medications on file prior to visit. Review of Systems      Objective:   Pulse 92   Temp 97 °F (36.1 °C) (Temporal)   Ht 5' 3\" (1.6 m)   Wt 188 lb (85.3 kg)   LMP 05/24/1999   SpO2 (!) 85%   BMI 33.30 kg/m²     Ortho Exam  Right upper extremity: Passive external rotation of the right shoulder to 30 degrees  Passive internal rotation of the shoulder to thoracic spine  Forward flexion to 160 degrees  Tender to palpation about the paraspinal musculature  Painful range of motion with neck extension and lateral deviation consistent with a positive spurling sign. Infraspinatus muscle appears to be intact, supraspinatus appears to be intact, subscapularis appears to be intact. Neuro: Able to perform all Cardinal hand movements distally.   Able to extend at the wrist, extend at the thumb, flex at the IP joint of the thumb and index finger, able to cross and on cross fingers without difficulty and as such able to abduct and adduct the fingers well. Sensation intact light touch in the radial, median, and ulnar nerve distribution. No distinct decreased sensation in a dermatomal distribution. Noted pain pattern in a C5 distribution. Myles sign absent. Biceps reflex diminished and 1+ and asymmetric compared to the contralateral side. Vascular: 2+ radial pulse, hand warm and well-perfused    Radiographs and Laboratory Studies:     Diagnostic Imaging Studies:    AP and scapular Y views of the right shoulder were reviewed from date of study 7/11/2022    Imaging demonstrates normal-appearing right shoulder with no obvious radiographic abnormality. Normal humeral acromial distance with B type acromion. CT scan of the neck was reviewed from [facility] study date of study 2/14/2022    Findings demonstrate advanced arthritic change appreciated about the lower cervical vertebrae most noted between C5 and C6 with near 100% disc space collapse. Laboratory Studies:   Lab Results   Component Value Date    WBC 13.8 (H) 05/24/2019    HGB 15.2 05/24/2019    HCT 44.9 05/24/2019    MCV 88.4 05/24/2019     05/24/2019     No results found for: SEDRATE  No results found for: CRP    Assessment:       Diagnosis Orders   1. Cervical radiculopathy at C5  Ambulatory referral to Physical Therapy             Plan:     Patient appears to have fairly classic cervical radiculopathy without myelopathic symptoms at this juncture. Recommend physical therapy to focus on strengthening and stretching of the cervical spine as well as oral steroid medication to decrease inflammation of the nerve root. We will see patient back in 6 weeks for repeat clinical examination and discuss further treatment at that juncture. Patient may benefit from MRI to further elucidate impingement of the cervical nerve roots if not improved with physical therapy and medication.     Orders Placed This Encounter   Procedures    Ambulatory referral to Physical Therapy Referral Priority:   Routine     Referral Type:   Eval and Treat     Referral Reason:   Specialty Services Required     Requested Specialty:   Physical Therapist     Number of Visits Requested:   1     Orders Placed This Encounter   Medications    dexamethasone (DECADRON) 0.5 MG tablet     Sig: Take 1 tablet by mouth in the morning and 1 tablet at noon and 1 tablet in the evening and 1 tablet before bedtime. Do all this for 10 days. Dispense:  40 tablet     Refill:  0       No follow-ups on file.       Michelle Pittman MD

## 2022-08-24 ENCOUNTER — HOSPITAL ENCOUNTER (OUTPATIENT)
Dept: PHYSICAL THERAPY | Age: 67
Setting detail: THERAPIES SERIES
Discharge: HOME | End: 2022-08-24
Payer: MEDICARE

## 2022-08-24 PROCEDURE — 97162 PT EVAL MOD COMPLEX 30 MIN: CPT

## 2022-08-24 PROCEDURE — 97140 MANUAL THERAPY 1/> REGIONS: CPT

## 2022-08-24 PROCEDURE — 97110 THERAPEUTIC EXERCISES: CPT

## 2022-08-24 ASSESSMENT — PAIN DESCRIPTION - ORIENTATION
ORIENTATION_2: RIGHT;ANTERIOR;OUTER
ORIENTATION: RIGHT

## 2022-08-24 ASSESSMENT — PAIN DESCRIPTION - DESCRIPTORS: DESCRIPTORS: ACHING;TIGHTNESS;PRESSURE

## 2022-08-24 ASSESSMENT — PAIN DESCRIPTION - INTENSITY: RATING_2: 2

## 2022-08-24 ASSESSMENT — PAIN DESCRIPTION - LOCATION
LOCATION_2: ARM
LOCATION: NECK

## 2022-08-24 ASSESSMENT — PAIN DESCRIPTION - FREQUENCY: FREQUENCY: CONTINUOUS

## 2022-08-24 ASSESSMENT — PAIN DESCRIPTION - PAIN TYPE: TYPE: CHRONIC PAIN

## 2022-08-24 ASSESSMENT — PAIN SCALES - GENERAL: PAINLEVEL_OUTOF10: 2

## 2022-08-24 NOTE — PROGRESS NOTES
Physical Therapy: Initial Evaluation    Patient: Ronaldo Ahmadi (54 y.o. female)   Examination Date:   Plan of Care Certification Period: 2022 to 22      :  1955 ;    Confirmed: Yes MRN: [id number]  CSN: [id number]   Insurance: Payor: [facility] / Plan: Calleen Sleet / Product Type: *No Product type* /   Insurance ID: [id number] - (Medicare Managed) Secondary Insurance (if applicable):    Referring Physician: Jimmy Tapia MD     PCP: Gunjan Dugan MD Visits to Date/Visits Approved: 1 / 10    No Show/Cancelled Appts:      Medical Diagnosis: Radiculopathy, cervical region [M54.12] cervical radiculopathy at C5  Treatment Diagnosis: Cervical radiculopathy at [address] [place] [address] History: Chart Reviewed: Yes   Past Medical History:   Diagnosis Date    Arthritis     Cerebral artery occlusion with cerebral infarction [facility]) 2016    TIA / sx left sided weakness & fell & unable to get up off floor for several hours    COPD (chronic obstructive pulmonary disease) ([facility] [address]     smoking habit > 40 yrs    Depression     Headache     Hyperlipidemia     meds > 10 yrs    Hypertension     meds since 2016    Neuropathy     POTS (postural orthostatic tachycardia syndrome)         Restless legs syndrome     Type 2 diabetes mellitus without complication ([facility] [address]     hx > 7 yrs     Surgical History:   Past Surgical History:   Procedure Laterality Date    ABDOMINAL EXPLORATION SURGERY  1970s    due to abdominal pain / no definite dx    BACK SURGERY  2018    lumbar disc OR at [address]    due to tendonitis    [address] [place] [address]    INSERT/ REPLACE INFUSN PUMP,PROGRAMMABLE N/A 2019    REMOVAL OF INTRATHECAL PUMP RESERVOIR performed by Silva Gatica MD at [address] [facility]      pain pump placement  & replacement     SKIN BIOPSY  2022    on a cyst on neck(benign)    TONSILLECTOMY      age 24       Medications:   Current Outpatient Medications:     dexamethasone (DECADRON) 0.5 MG tablet, Take 1 tablet by mouth in the morning and 1 tablet at noon and 1 tablet in the evening and 1 tablet before bedtime. Do all this for 10 days. , Disp: 40 tablet, Rfl: 0    insulin glargine (LANTUS SOLOSTAR) 100 UNIT/ML injection pen, INJECT SUBCUTANEOUSLY 30  UNITS TWICE DAILY, Disp: 60 mL, Rfl: 1    venlafaxine (EFFEXOR XR) 150 MG extended release capsule, TAKE 1 CAPSULE BY MOUTH  DAILY, Disp: 90 capsule, Rfl: 3    metFORMIN (GLUCOPHAGE-XR) 500 MG extended release tablet, TAKE 1 TABLET BY MOUTH  TWICE DAILY, Disp: 180 tablet, Rfl: 1    clopidogrel (PLAVIX) 75 MG tablet, TAKE 1 TABLET BY MOUTH AT  NIGHT, Disp: 90 tablet, Rfl: 3    Insulin Pen Needle (MEIJER PEN NEEDLES) 31G X 6 MM MISC, 1 each by Does not apply route daily, Disp: 100 each, Rfl: 3    lisinopril (PRINIVIL;ZESTRIL) 10 MG tablet, TAKE 1 TABLET BY MOUTH AT  NIGHT, Disp: 90 tablet, Rfl: 3    simvastatin (ZOCOR) 40 MG tablet, TAKE 1 TABLET BY MOUTH AT  NIGHT, Disp: 90 tablet, Rfl: 2    blood glucose monitor strips, Use TID, Disp: 100 strip, Rfl: 11    Lancets MISC, 1 each by Does not apply route 3 times daily, Disp: 100 each, Rfl: 11    meloxicam (MOBIC) 15 MG tablet, Take 1 tablet by mouth daily, Disp: 90 tablet, Rfl: 3    omeprazole (PRILOSEC) 40 MG delayed release capsule, Take 1 capsule by mouth daily, Disp: 90 capsule, Rfl: 3    Lancet Device MISC, 1 Device by Does not apply route 4 times daily (before meals and nightly) Test 4x/day., Disp: 1 each, Rfl: 0    insulin lispro (HUMALOG KWIKPEN) 200 UNIT/ML SOPN pen, INJECT SUBCUTANEOUSLY 3  UNITS FOR EVERY 15 GRAMS OF CARBOHYDRATES; 65-70 UNITS  DAILY, Disp: 36 mL, Rfl: 3    blood glucose monitor kit and supplies, Use TID, Disp: 1 kit, Rfl: 0    tiZANidine (ZANAFLEX) 4 MG tablet, Take 4 mg by mouth nightly, Disp: , Rfl: 0    acetaminophen-codeine (TYLENOL/CODEINE #4) 300-60 MG per tablet, Take 1 tablet by mouth 3 times daily as needed. ., Disp: , Rfl:   Allergies: Aspirin and Penicillins      SUBJECTIVE EXAMINATION      ,           Subjective History: Onset Date: 03/01/22  Subjective: Pt reports 5 month hisotory of RUE tingly and  numbness ant and lateral  and meck pain at 2/10. Additional Pertinent Hx (if applicable): past history of back surgery- still some symptoms- no HEP or PT post back surgery. Approx 5 month history of RUE pain which MD thinks is coming more from neck than R shoulder per pt report.    Prior diagnostic testing[de-identified] CT Scan  Previous treatments prior to current episode?:  (steroids, mobic x many years for low back)      Learning/Language:       Pain Screening   Pain Screening  Patient Currently in Pain: Yes  Pain Assessment: 0-10  Pain Level: 2  Best Pain Level: 2  Worst Pain Level: 5  Date pain first started: 03/01/22  Patient's Stated Pain Goal: 1  Pain Type: Chronic pain  Pain Location: Neck  Pain Orientation: Right  Pain Descriptors: Aching, Tightness, Pressure  Pain Frequency: Continuous  Pain Onset:  (never slept good anyway , up every 1-2 hours)  Multiple Pain Sites: Yes  Pain 2  Pain Rating 2: 2  Patient's Stated Pain Goal 2: 1 (max up to 10/10)  Pain Location 2: Arm  Pain Orientation 2: Right, Anterior, Outer  Pain Descriptors 2: Pins and Needles, Numbness, Sharp, Aching    Functional Status    Dominant Hand: : Right    Occupation/Interests:  Occupation: On disability  Leisure & Hobbies: 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 Palpation:   Cervical Spine Palpation: tight and guarded thorughout cervical right worse than left, dificulty relaxing for slight distraction - good response to MFR of cervical paraspinals.  , greates pain reproduced C4-6 right bneck with oscillations/ genlt mobs, responds well to slight distaractiona t 20 deg flexion      Left AROM  Right AROM      General AROM UE:  (standing)  AROM LUE (degrees)  L Shoulder Flexion 0-180: 0-136 tight  L Shoulder Extension 0-45: 0-47  L Shoulder ABduction 0-180: 0- 58 with inc upper arm/biceps pain  L Shoulder Int Rotation  0-70: functional to T6  L Shoulder Ext Rotation  0-90: 0-51 with tightness General AROM UE:  (standing)  AROM RUE (degrees)  R Shoulder Flexion 0-180: 0-140 tight  R Shoulder Extension 0-45: 0-45  R Shoulder ABduction 0-180: 0-74 with inc upper arm/biceps pain  R Shoulder Int Rotation  0-70: functional to T7  R Shoulder Ext Rotation 0-90: 0-45 deg withinc pain in R upper arm     Left PROM  Right PROM      General PROM UE:  (supine)  PROM LUE (degrees)  L Shoulder Flex  0-170: 0-162  L Shoulder ABduction 0-140: 0-105 with inc pain General PROM UE:  (supine)  PROM RUE (degrees)  R Shoulder Flex  0-180: 0-159  R Shoulder ABduction 0-180: 0-91 with inc pain     Left Strength  Right Strength      General Strength Testing UE:  (standing avaialble ROM)  Strength LUE  L Shoulder Flexion: 4/5  L Shoulder Extension: 4/5, 4+/5  L Shoulder ABduction: 4-/5  L Shoulder Internal Rotation: 4/5, 4+/5  L Shoulder External Rotation: 4/5  L Elbow Flexion: 4+/5  L Elbow Extension: 4/5, 4+/5  L Forearm Pron: 4+/5  L Forearm Sup: 4+/5  L Wrist Flexion: 4+/5  L Wrist Extension: 4+/5 General Strength Testing UE:  (standing avaialble ROM)  Strength RUE  R Shoulder Flexion: 4-/5, 4/5  R Shoulder Extension: 4/5  R Shoulder ABduction: 3+/5, 4-/5  R Shoulder Internal Rotation: 4/5  R Shoulder External Rotation: 4-/5, 3+/5  R Elbow Flexion: 4-/5, 4/5  R Elbow Extension: 4/5  R Forearm Pron: 4-/5  R Forearm Sup: 4-/5  R Wrist Flexion: 4-/5, 4/5  R Wrist Extension: 4/5     Cervical Assessment   AROM Cervical Spine   Cervical spine general AROM: flex 34 , ext with slight cervical retraction too 11deg with inc pain in the neck, LSB 12deg with in pain on the right, RSB 22 deg wtih inc pain on the left, RROT 51 deg, LROT 48 deg         Special Tests:      NDI 18/45= 46% disability    KT tape trial to R inner forearm for possible use for pain and posture in tx     ASSESSMENT     Impression: Assessment: 67yof with history of neck and RUE pain past 5-6 months noted to have pain and weakness in neck and shoulders, decreased posture, and decreased function as per NDI- neck disability index. Recommend skilled OP PT 1-2x week for 4-5 weeks. Pt agrees to plan and HEP. Pt responded well to gentle distraciton cervical at 20 deg wtih dec pain in neck and inc motion. Body Structures, Functions, Activity Limitations Requiring Skilled Therapeutic Intervention: Decreased functional mobility , Increased pain, Decreased posture, Decreased strength, Decreased ROM, Decreased ADL status    Statement of Medical Necessity: Physical Therapy is both indicated and medically necessary as outlined in the POC to increase the likelihood of meeting the functionally related goals stated below. Patient's Activity Tolerance: Patient limited by pain      Patient's rehabilitation potential/prognosis is considered to be: Factors which may impact rehabilitation potential include:          GOALS   Patient Goal(s): \"I would like my neck and right arm to feel better. \"  Short Term Goals Completed by 1-2 weeks Goal Status   Pt reporting any decrease in neck pain.      Pt reporting any decrease in RUE pain     Pt reporting HEP at least once a day 5/7 days to help ospoture and pain management of neck and RUE     Increase cervcial sidebend >= 5 deg and inc AROm shoudler abduction in standing B >= 5 deg                                             Long Term Goals Completed by 4-5 weeks Goal Status   Increase cervical sidebend B >= 25 deg and shoudler flex>= 120 deg and abduction >= 110 deg B for better motin for funciton and less pain at C4-6     Increase B shoulder strength >= 1/2 grade for better function and less neck/RUE pain, Posture with dec fwd head and dec B shoulder protraction >= 1/2 inch.      RUE symptoms decreased >= 50% per pt report wtioh less pain , less numbness and less tingling     NDI neck disability index decrease form 18/454= 46% disabilty at eval to <= 25%disability to demonstrate better function with less pain in neck and RUE     Pt competent advanced HEP for cervcial, shoulder and posture                                        TREATMENT PLAN            Pt. actively involved in establishing Plan of Care and Goals: Yes  Patient/ Caregiver education and instruction:      KT tape        Treatment may include any combination of the following: Strengthening, ROM, Functional mobility training, Manual Therapy - Soft Tissue Mobilization, Safety education & training, Home exercise program, Modalities, Positioning, Patient/Caregiver education & training, Pain management     Frequency / Duration:  Patient to be seen   for   weeks      Eval Complexity:  moderate       PT Treatment Completed:  Exercises:      Treatment Reasoning    Exercise 1: scapular retractions 2-3 hold x 10 in tall sit, pillows behind back to assist LBP and dec core stregnth  Exercise 2: seatead capital D stretch x 10 with pillows behind back for LBP anddec lower core strength  Exercise 3: tall sit unsupported  breathing in nose out mouth to lower abdominal bracing gently 2x 10                          Therapeutic Activities: (CPT 22152) Treatment Reasoning    Ther Act Exercise 1: education posture, exam results, goals, tx plan, HEP Modalities  Cryotherapy: (CPT 24408) Treatment Reasoning    Patient Position: Seated  Cryotherapy location: Cervical  Post treatment skin assessment: Redness - no adverse reaction  Post treatment skin assessment comments: 10 minutes along with education , pain 3-4/10 pre ice, down to 2/10 post ice in neck Limitations addressed: Pain modulation, Edema                    Therapy Time  Individual Time In:       800  Individual Time Out:  [address] [place] [address]  Minutes:  72        Therapist Signature: Shay Garland, PT    Date: 5/78/3574     I certify that the above Therapy Services are being furnished while the patient is under my care. I agree with the treatment plan and certify that this therapy is necessary. [de-identified] Signature:  ___________________________   Date:_______                                                                   Shayla Lucio MD        Physician Comments: _______________________________________________    Please sign and return to [address] [place] [address]. Please fax to the location listed below.  Ulysses Armenta for this referral!    [facility]  [address] [place] [address] [address] [id number]  Dept: [phone]  Dept Fax: [phone] : [phone]       POC NOTE

## 2022-08-26 ENCOUNTER — HOSPITAL ENCOUNTER (OUTPATIENT)
Dept: PHYSICAL THERAPY | Age: 67
Setting detail: THERAPIES SERIES
Discharge: HOME | End: 2022-08-26
Payer: MEDICARE

## 2022-08-26 PROCEDURE — 97140 MANUAL THERAPY 1/> REGIONS: CPT

## 2022-08-26 PROCEDURE — 97110 THERAPEUTIC EXERCISES: CPT

## 2022-08-26 NOTE — PROGRESS NOTES
Physical Therapy: Daily Note   Patient: Tanya Davila (84 y.o. female)   Examination Date:   Plan of Care/Certification Expiration Date: 22    No data recorded   :  1955 # of Visits since [facility] [place]:   2   MRN: [id number]  CSN: [id number] Start of Care Date:   2022   Insurance: Payor: [facility] MEDICARE / Plan: Kinjal Parks / Product Type: *No Product type* /   Insurance ID: [id number] - (Medicare Managed) Secondary Insurance (if applicable):    Referring Physician: Gabriella Del Angel MD     PCP: Veronica Whyte MD Visits to Date/Visits Approved: 2 / 10    No Show/Cancelled Appts: 0  0     Medical Diagnosis: Radiculopathy, cervical region [M54.12] cervical radiculopathy at C5  Treatment Diagnosis: Cervical radiculopathy at C5        SUBJECTIVE EXAMINATION   Pain Level:      Patient Comments: Subjective: Pt reports having 4/10 left sided neck pain \"sharp,  achy and dull\" and having numbness/tingling of her right forearm and thumb and pointer finger of right hand.     HEP Compliance: Good  Previous treatments prior to current episode?:  (steroids, mobic x many years for low back)     OBJECTIVE EXAMINATION   Restrictions:  No data recorded No data recorded No data recorded      TREATMENT     Exercises:      Treatment Reasoning    Exercise 1: scapular retractions 2-3 hold x 10 in tall sit, pillows behind back to assist LBP and dec core strength  Exercise 2: seatead capital D stretch x 10 with pillows behind back for LBP and dec lower core strength  Exercise 3: tall sit unsupported  breathing in nose out mouth to lower abdominal bracing gently 2x 10  Exercise 4: *Post shoulder stretch x 5 H30  Exercise 5: *UT stretch x 5 H10  Exercise 6: *Levator stretch x 5 H10                          Manual Therapy: (CPT 54219) Treatment Reasoning     Soft Tissue Mobilizaton: STM of pts left upper trap and medial scap to decrease muscle soreness and tightness  Other: KT tape applied to left UT prox to distal with 25% pull to decrease muscle soreness; 2nd strip over left superior shoulder max tension in center and no tension on ends to decrease muscle soreness. Pt Education: Plan Comment: KT tape       ASSESSMENT     Assessment: Assessment: Pt arrives to therapy with  4/10 left sided neck pain, Pt reports that her neck and back were aggravated after her last session and she took pain medicine that night. Pt performed seated ther ex and was instucted in additional stretchtes for her neck and posterior shoulsers. PT then performed STM to pts left UT and medial scap to decrease muscle soreness and then applied KT tape for pain control. Pt was given HEP with UT, levator and post shoulder stretch for her HEP. CP was applied to neck/shoulders for 10'. Post tx pt reports pain level is still a 4/10 but now dull pain. Continue to progress as able to tolerate. Body Structures, Functions, Activity Limitations Requiring Skilled Therapeutic Intervention: Decreased functional mobility , Increased pain, Decreased posture, Decreased strength, Decreased ROM, Decreased ADL status    Post-Treatment Pain Level: Activity Tolerance: Patient limited by pain    Therapy Prognosis: Good       GOALS   Patient goals : \"I would like my neck and right arm to feel better. \"  Short Term Goals Completed by 1-2 weeks Current Status Goal Status   Pt reporting any decrease in neck pain.        Pt reporting any decrease in RUE pain       Pt reporting HEP at least once a day 5/7 days to help ospoture and pain management of neck and RUE       Increase cervcial sidebend >= 5 deg and inc AROm shoudler abduction in standing B >= 5 deg                                                           Long Term Goals Completed by 4-5 weeks Current Status Goal Status   Increase cervcial sidebend B >25degrees, increase shoulder flexion >120 degrees and abd >110 for better function and less pain at C4-6       Increase B shoulder strength >= 1/2 grade for better function and less neck/RUE pain, Posture with dec fwd head and dec B shoulder protraction >= 1/2 inch.        RUE symptoms decreased >= 50% per pt report wtioh less pain , less numbness and less tingling       NDI neck disability index decrease form 18/454= 46% disabilty at eval to <= 25%disability to demonstrate better function with less pain in neck and RUE       Pt competent advanced HEP for cervcial, shoulder and posture                                                    TREATMENT PLAN   Plan Frequency: 1-2  Plan weeks: 4-5  Current Treatment Recommendations: Strengthening, ROM, Functional mobility training, Manual Therapy - Soft Tissue Mobilization, Safety education & training, Home exercise program, Modalities, Positioning, Patient/Caregiver education & training, Pain management   Requires PT Follow-Up: Yes  Plan Comment: KT tape       Therapy Time  Individual Time In:      3:30pm   Individual Time Out:  4:30pm  Minutes:  60 min         Electronically signed by Renny Eddy PT  on 8/26/2022 at 4:54 PM

## 2022-08-31 ENCOUNTER — HOSPITAL ENCOUNTER (OUTPATIENT)
Dept: PHYSICAL THERAPY | Age: 67
Setting detail: THERAPIES SERIES
Discharge: HOME | End: 2022-08-31
Payer: MEDICARE

## 2022-08-31 PROCEDURE — 97110 THERAPEUTIC EXERCISES: CPT

## 2022-08-31 PROCEDURE — 97140 MANUAL THERAPY 1/> REGIONS: CPT

## 2022-08-31 ASSESSMENT — PAIN DESCRIPTION - LOCATION
LOCATION_2: NECK
LOCATION: BACK

## 2022-08-31 ASSESSMENT — PAIN DESCRIPTION - PAIN TYPE: TYPE: CHRONIC PAIN

## 2022-08-31 ASSESSMENT — PAIN DESCRIPTION - INTENSITY: RATING_2: 4

## 2022-08-31 ASSESSMENT — PAIN DESCRIPTION - ORIENTATION: ORIENTATION_2: RIGHT;LEFT;OUTER

## 2022-08-31 ASSESSMENT — PAIN SCALES - GENERAL: PAINLEVEL_OUTOF10: 6

## 2022-08-31 NOTE — PROGRESS NOTES
Physical Therapy: Daily Note   Patient: Dinesh Segura (89 y.o. female)   Examination Date:   Plan of Care/Certification Expiration Date: 22    No data recorded   :  1955 # of Visits since [facility]:   3   MRN: [id number]  CSN: [id number] Start of Care Date:   2022   Insurance: Payor: [facility] MEDICARE / Plan: Melo Ceron / Product Type: *No Product type* /   Insurance ID: [id number] - (Medicare Managed) Secondary Insurance (if applicable):    Referring Physician: Eric Michelle MD     PCP: Kristin Gonzalez MD Visits to Date/Visits Approved: 3 / 10    No Show/Cancelled Appts: 0 / 0     Medical Diagnosis: Radiculopathy, cervical region [M54.12]    Treatment Diagnosis: Cervical radiculopathy at C5        SUBJECTIVE EXAMINATION   Pain Level: Pain Screening  Patient Currently in Pain: Yes  Pain Assessment: 0-10  Pain Level: 6  Pain Type: Chronic pain  Pain Location: Back  Pain 2  Pain Rating 2: 4  Pain Location 2: Neck  Pain Orientation 2: Right, Left, Outer    Patient Comments: Subjective: Pt. states pain level has increased \"exercises are flaring up my lumbar. \"  Pt. states low back pain 6/10 and neck and shoulders are 4/10. Pt. reports performing her exercises in her recliner at home has lumbar support but legs don't touch floor. N/T in R outer radial path pt. reports occ on L now not currently.     HEP Compliance: Good        OBJECTIVE EXAMINATION   Restrictions:  No data recorded No data recorded No data recorded              TREATMENT     Exercises:      Treatment Reasoning    Exercise 1: scapular retractions 2-3 hold x 10 in tall sit, lumbar towel roll  behind back to assist LBP and dec core strength  Exercise 2: seatead capital D stretch x 10 with lumbar towel roll  behind back for LBP and dec lower core strength  Exercise 3: tall sit unsupported  breathing in nose out mouth to lower abdominal bracing gently x 10  Exercise 5: UT stretch x 3 hold 15 sec  Exercise 6: *Levator stretch x 3 H15  Exercise 7: Seated B ER x 10                          Manual Therapy: (CPT 24604) Treatment Reasoning     Joint Mobilization: grade 1 and 2 cervical mobs with slight distractoin subociput to C7, suboccipital release >15 min dec numbness with distraction  Other: KT tape applied anchored C7 with lateral pull over UT 10% tnesion for pain and edema reduction, KT to B UT crossing musclebelly with post pull 50% tension to dec pain and tightness                      Pt Education: Plan Comment: KT tape       ASSESSMENT     Assessment: Assessment: Pt. c/o inc low back pain when performing her exercises. Instructed in lumbar support and stool for fett fro proper sitting position. Trialed this date with exercises and lumbar towel roll and feet properly positioned on floor. Decreased strain on back reported and states tolerable. >15 min iwth suboccipital release dec N/T with distraction. Pain post 3/10. Applied KT for pain relief. Pt. to ice at home. Body Structures, Functions, Activity Limitations Requiring Skilled Therapeutic Intervention: Decreased functional mobility , Increased pain, Decreased posture, Decreased strength, Decreased ROM, Decreased ADL status    Post-Treatment Pain Level: Activity Tolerance: Patient limited by pain    Therapy Prognosis: Good       GOALS   Patient goals : \"I would like my neck and right arm to feel better. \"  Short Term Goals Completed by 1-2 weeks Current Status Goal Status   Pt reporting any decrease in neck pain.        Pt reporting any decrease in RUE pain       Pt reporting HEP at least once a day 5/7 days to help ospoture and pain management of neck and RUE       Increase cervcial sidebend >= 5 deg and inc AROm shoudler abduction in standing B >= 5 deg                                                           Long Term Goals Completed by 4-5 weeks Current Status Goal Status   Increase cervcial sidebend B >25degrees, increase shoulder flexion >120 degrees and abd >110 for better function and less pain at C4-6       Increase B shoulder strength >= 1/2 grade for better function and less neck/RUE pain, Posture with dec fwd head and dec B shoulder protraction >= 1/2 inch.        RUE symptoms decreased >= 50% per pt report wtioh less pain , less numbness and less tingling       NDI neck disability index decrease form 18/454= 46% disabilty at eval to <= 25%disability to demonstrate better function with less pain in neck and RUE       Pt competent advanced HEP for cervcial, shoulder and posture                                                    TREATMENT PLAN   Plan Frequency: 1-2  Plan weeks: 4-5  Current Treatment Recommendations: Strengthening, ROM, Functional mobility training, Manual Therapy - Soft Tissue Mobilization, Safety education & training, Home exercise program, Modalities, Positioning, Patient/Caregiver education & training, Pain management   Plan Comment: JOSE MANUEL tape       Therapy Time  Individual Time In:       800  Individual Time Out:  845  Minutes:  45        Electronically signed by Hui Bess PTA  on 1/49/8288 at 8:45 AM   POC NOTE  Physical Therapy

## 2022-09-02 ENCOUNTER — HOSPITAL ENCOUNTER (OUTPATIENT)
Dept: PHYSICAL THERAPY | Age: 67
Setting detail: THERAPIES SERIES
Discharge: HOME | End: 2022-09-02
Payer: MEDICARE

## 2022-09-02 PROCEDURE — 97140 MANUAL THERAPY 1/> REGIONS: CPT

## 2022-09-02 PROCEDURE — 97110 THERAPEUTIC EXERCISES: CPT

## 2022-09-02 ASSESSMENT — PAIN SCALES - GENERAL: PAINLEVEL_OUTOF10: 3

## 2022-09-02 ASSESSMENT — PAIN DESCRIPTION - PAIN TYPE: TYPE: CHRONIC PAIN

## 2022-09-02 ASSESSMENT — PAIN DESCRIPTION - DESCRIPTORS: DESCRIPTORS: ACHING

## 2022-09-02 ASSESSMENT — PAIN DESCRIPTION - LOCATION: LOCATION: NECK;SHOULDER

## 2022-09-02 ASSESSMENT — PAIN DESCRIPTION - ORIENTATION: ORIENTATION: RIGHT

## 2022-09-02 NOTE — PROGRESS NOTES
Physical Therapy: Daily Note   Patient: Marcus Smith (43 y.o. female)   Examination Date:   Plan of Care/Certification Expiration Date: 22    No data recorded   :  1955 # of Visits since [facility]:   4   MRN: [id number]  CSN: [id number] Start of Care Date:   2022   Insurance: Payor: [facility] MEDICARE / Plan: Lay April / Product Type: *No Product type* /   Insurance ID: [id number] - (Medicare Managed) Secondary Insurance (if applicable):    Referring Physician: Emory Haro MD     PCP: Shital Rouse MD Visits to Date/Visits Approved: 4 / 10    No Show/Cancelled Appts: 0 / 0     Medical Diagnosis: Radiculopathy, cervical region [M54.12]    Treatment Diagnosis: Cervical radiculopathy at C5        SUBJECTIVE EXAMINATION   Pain Level: Pain Screening  Patient Currently in Pain: Yes  Pain Assessment: 0-10  Pain Level: 3  Pain Type: Chronic pain  Pain Location: Neck, Shoulder  Pain Orientation: Right  Pain Descriptors: Aching    Patient Comments: Subjective: Pt. states she is hanging in there and less intense N/T but achiness in lateral R arm still present. N/T coming and going. Pt. reports less intense back pain with doing exercises with better seated position. Pain level in neck 3/10 achey per pt.     HEP Compliance: Good        OBJECTIVE EXAMINATION   Restrictions:  No data recorded No data recorded No data recorded              TREATMENT     Exercises:      Treatment Reasoning    Exercise 1: scapular retractions 2-3 hold x 10 in tall sit, lumbar towel roll  behind back to assist LBP and dec core strength  Exercise 2: seatead capital D stretch x 10 with lumbar towel roll  behind back for LBP and dec lower core strength  Exercise 3: tall sit unsupported  breathing in nose out mouth to lower abdominal bracing gently x 10  Exercise 4: *Post shoulder stretch x 2 H30  Exercise 5: UT stretch x 3 hold 30 sec  Exercise 6: *Levator stretch x 2 H30  Exercise 7: Seated B ER 2 x5 decrease form 18/454= 46% disabilty at eval to <= 25%disability to demonstrate better function with less pain in neck and RUE       Pt competent advanced HEP for cervcial, shoulder and posture                                                    TREATMENT PLAN   Plan Frequency: 1-2  Plan weeks: 4-5  Current Treatment Recommendations: Strengthening, ROM, Functional mobility training, Manual Therapy - Soft Tissue Mobilization, Safety education & training, Home exercise program, Modalities, Positioning, Patient/Caregiver education & training, Pain management   Plan Comment: JOSE MANUEL tape       Therapy Time  Individual Time In:     800    Individual Time Out:  845  Minutes:  45        Electronically signed by Lorene Jordan PTA  on 5/2/5024 at 9:11 AM   POC NOTE  Physical Therapy

## 2022-09-07 ENCOUNTER — HOSPITAL ENCOUNTER (OUTPATIENT)
Dept: PHYSICAL THERAPY | Age: 67
Setting detail: THERAPIES SERIES
Discharge: HOME | End: 2022-09-07
Payer: MEDICARE

## 2022-09-07 PROCEDURE — 97140 MANUAL THERAPY 1/> REGIONS: CPT

## 2022-09-07 PROCEDURE — 97110 THERAPEUTIC EXERCISES: CPT

## 2022-09-07 ASSESSMENT — PAIN DESCRIPTION - FREQUENCY: FREQUENCY: CONTINUOUS

## 2022-09-07 ASSESSMENT — PAIN DESCRIPTION - LOCATION: LOCATION: NECK

## 2022-09-07 ASSESSMENT — PAIN DESCRIPTION - DESCRIPTORS: DESCRIPTORS: TIGHTNESS

## 2022-09-07 ASSESSMENT — PAIN SCALES - GENERAL: PAINLEVEL_OUTOF10: 4

## 2022-09-07 ASSESSMENT — PAIN DESCRIPTION - ORIENTATION: ORIENTATION: RIGHT;LEFT

## 2022-09-07 NOTE — PROGRESS NOTES
Physical Therapy: Daily Note   Patient: Dallas Nava (54 y.o. female)   Examination Date:   Plan of Care/Certification Expiration Date: 22    No data recorded   :  1955 # of Visits since [facility]:   5   MRN: [id number]  CSN: [id number] Start of Care Date:   2022   Insurance: Payor: [facility] MEDICARE / Plan: Sloane Guardian / Product Type: *No Product type* /   Insurance ID: [id number] - (Medicare Managed) Secondary Insurance (if applicable):    Referring Physician: Dorrie Osler, MD     PCP: Alberta Alva MD Visits to Date/Visits Approved: 5 / 10    No Show/Cancelled Appts: 0  0     Medical Diagnosis: Radiculopathy, cervical region [M54.12]    Treatment Diagnosis: Cervical radiculopathy at C5        SUBJECTIVE EXAMINATION   Pain Level: Pain Screening  Patient Currently in Pain: Yes  Pain Assessment: 0-10  Pain Level: 4  Pain Location: Neck  Pain Orientation: Right, Left  Pain Descriptors: Tightness  Pain Frequency: Continuous    Patient Comments: Subjective: Pt. reports she is having a bad day and reports she may have slept wrong. HA since Friday. Pt. reports had to take a pain pill this morning and pain currently 4/10 from 7/10. Pt. states she did some stretches this morning trying to loosen up.     HEP Compliance: Good        OBJECTIVE EXAMINATION   Restrictions:  No data recorded No data recorded No data recorded              TREATMENT     Exercises:      Treatment Reasoning    Exercise 1: scap retractions in standing facing mirror for postural awareaness  Exercise 2: standing facing mirror BUE ER x 10 hold 3 sec  Exercise 3: supine cervical retraction x 10 gentle first couple tactile and VC issued to HEP  Exercise 4: supine Pec stretch horizontal abd with towel roll for cervical spine x 1-2 min                          Manual Therapy: (CPT 24735) Treatment Reasoning     Joint Mobilization: cervical distraction and slide glide L C3-C5, Cervical mobs grade I-III to dec pain and function with less pain in neck and RUE       Pt competent advanced HEP for cervcial, shoulder and posture                                                    TREATMENT PLAN   Plan Frequency: 1-2  Plan weeks: 4-5  Current Treatment Recommendations: Strengthening, ROM, Functional mobility training, Manual Therapy - Soft Tissue Mobilization, Safety education & training, Home exercise program, Modalities, Positioning, Patient/Caregiver education & training, Pain management   Requires PT Follow-Up: Yes  Plan Comment: KT tape       Therapy Time  Individual Time In:       800  Individual Time Out:  845  Minutes:  45        Electronically signed by Justin Hoffman PTA  on 4/4/5501 at 8:43 AM   POC NOTE  Physical Therapy

## 2022-09-09 ENCOUNTER — HOSPITAL ENCOUNTER (OUTPATIENT)
Dept: PHYSICAL THERAPY | Age: 67
Setting detail: THERAPIES SERIES
Discharge: HOME | End: 2022-09-09
Payer: MEDICARE

## 2022-09-09 PROCEDURE — 97140 MANUAL THERAPY 1/> REGIONS: CPT

## 2022-09-09 PROCEDURE — 97110 THERAPEUTIC EXERCISES: CPT

## 2022-09-09 ASSESSMENT — PAIN DESCRIPTION - PAIN TYPE: TYPE: CHRONIC PAIN

## 2022-09-09 ASSESSMENT — PAIN DESCRIPTION - ORIENTATION: ORIENTATION: RIGHT

## 2022-09-09 ASSESSMENT — PAIN DESCRIPTION - LOCATION: LOCATION: NECK;ARM

## 2022-09-09 ASSESSMENT — PAIN DESCRIPTION - DESCRIPTORS: DESCRIPTORS: ACHING

## 2022-09-09 NOTE — PROGRESS NOTES
Physical Therapy: Daily Note   Patient: David Lopez (18 y.o. female)   Examination Date:   Plan of Care/Certification Expiration Date: 22    No data recorded   :  1955 # of Visits since [facility]:   6   MRN: [id number]  CSN: [id number] Start of Care Date:   2022   Insurance: Payor: [facility] MEDICARE / Plan: Teri Huff / Product Type: *No Product type* /   Insurance ID: [id number] - (Medicare Managed) Secondary Insurance (if applicable):    Referring Physician: Pranav Restrepo MD     PCP: David Phillips MD Visits to Date/Visits Approved: 6 / 10    No Show/Cancelled Appts: 0 / 0     Medical Diagnosis: Radiculopathy, cervical region [M54.12]    Treatment Diagnosis: Cervical radiculopathy at C5        SUBJECTIVE EXAMINATION   Pain Level: Pain Screening  Patient Currently in Pain: Yes  Pain Assessment: 0-10  Pain Type: Chronic pain  Pain Location: Neck, Arm  Pain Orientation: Right  Pain Radiating Towards: R ant arm  Pain Descriptors: Aching    Patient Comments: Subjective: Pt. states she is feeling better pain level 2/10. Some numbness in R arm. Can go all the way down to hand to thumb and index finger. HEP Compliance: Good        OBJECTIVE EXAMINATION   Restrictions:  No data recorded No data recorded No data recorded              TREATMENT     Exercises:      Treatment Reasoning    Exercise 1: scap retractions in standing facing mirror for postural awareaness  Exercise 3: supine cervical retraction x 10 gentle  Exercise 5: UT stretch x 2 hold 30 sec  Exercise 7: B ER YTB x 10  Exercise 9: Tray stretch x 5 no change in symptoms  Exercise 10: Radial nerve glide x 5 hold 5 sec less intense numbnesss cues for technique. Exercise 11: Mid Rows x 10 YTB gentle abd bracing mild low back pain with standing  Exercise 12:  B cervical Rotation x 5 hold 5-10 sec                          Manual Therapy: (CPT 90429) Treatment Reasoning     Joint Mobilization: cervical distraction  Cervical mobs grade I-III to dec pain and improve mobility R>L, OA mobs cervical spine, PROM/Stretching C spine B Rot and B SB for inc ROM  Soft Tissue Mobilizaton: MFR Cervical Paraspinals, R UT Triggor point release                      Pt Education: Plan Comment: KT tape       ASSESSMENT     Assessment: Assessment: Pt. experienced dec numbness in R arm following radial nerve glide. VC needed for form but able to perform with cues. Added to HEP. Progressing slowly with postural strengthening but limited by back pain. Educated and incorporated gentle abd bracing and staggered stance. VC throuhout session to dec hiking shd. Good response with manual therapy also performed PROM to C spine to improve mobiltiy. Pain 1/10 post and tingling lightly in R upper arm vs numbness. Pt. to ice at home. Body Structures, Functions, Activity Limitations Requiring Skilled Therapeutic Intervention: Decreased functional mobility , Increased pain, Decreased posture, Decreased strength, Decreased ROM, Decreased ADL status    Post-Treatment Pain Level: Activity Tolerance: Patient tolerated treatment well; Patient limited by pain    Therapy Prognosis: Good       GOALS   Patient goals : \"I would like my neck and right arm to feel better. \"  Short Term Goals Completed by 1-2 weeks Current Status Goal Status   Pt reporting any decrease in neck pain.        Pt reporting any decrease in RUE pain       Pt reporting HEP at least once a day 5/7 days to help ospoture and pain management of neck and RUE       Increase cervcial sidebend >= 5 deg and inc AROm shoudler abduction in standing B >= 5 deg                                                           Long Term Goals Completed by 4-5 weeks Current Status Goal Status   Increase cervcial sidebend B >25degrees, increase shoulder flexion >120 degrees and abd >110 for better function and less pain at C4-6       Increase B shoulder strength >= 1/2 grade for better function and less neck/RUE pain, Posture with dec fwd head and dec B shoulder protraction >= 1/2 inch.        RUE symptoms decreased >= 50% per pt report wtioh less pain , less numbness and less tingling       NDI neck disability index decrease form 18/454= 46% disabilty at eval to <= 25%disability to demonstrate better function with less pain in neck and RUE       Pt competent advanced HEP for cervcial, shoulder and posture                                                    TREATMENT PLAN   Plan Frequency: 1-2  Plan weeks: 4-5  Current Treatment Recommendations: Strengthening, ROM, Functional mobility training, Manual Therapy - Soft Tissue Mobilization, Safety education & training, Home exercise program, Modalities, Positioning, Patient/Caregiver education & training, Pain management   Requires PT Follow-Up: Yes  Plan Comment: KT tape       Therapy Time  Individual Time In:       36  Individual Time Out:  920  Minutes:  60        Electronically signed by Jasvir Espinoza PTA  on 7/3/3359 at 9:25 AM   POC NOTE  Physical Therapy

## 2022-09-14 ENCOUNTER — HOSPITAL ENCOUNTER (OUTPATIENT)
Dept: PHYSICAL THERAPY | Age: 67
Setting detail: THERAPIES SERIES
Discharge: HOME | End: 2022-09-14
Payer: MEDICARE

## 2022-09-14 PROCEDURE — 97110 THERAPEUTIC EXERCISES: CPT

## 2022-09-14 PROCEDURE — 97140 MANUAL THERAPY 1/> REGIONS: CPT

## 2022-09-14 ASSESSMENT — PAIN DESCRIPTION - ORIENTATION: ORIENTATION: RIGHT

## 2022-09-14 ASSESSMENT — PAIN DESCRIPTION - LOCATION: LOCATION: NECK;ARM;HAND

## 2022-09-14 ASSESSMENT — PAIN DESCRIPTION - DESCRIPTORS: DESCRIPTORS: ACHING;TIGHTNESS;TINGLING

## 2022-09-14 ASSESSMENT — PAIN DESCRIPTION - FREQUENCY: FREQUENCY: CONTINUOUS

## 2022-09-14 NOTE — PROGRESS NOTES
Physical Therapy: Daily Note   Patient: India Olszewski (89 y.o. female)   Examination Date:   Plan of Care/Certification Expiration Date: 22    No data recorded   :  1955 # of Visits since [facility]:   7   MRN: [id number]  CSN: [id number] Start of Care Date:   2022   Insurance: Payor: [facility] MEDICARE / Plan: Edel Dial / Product Type: *No Product type* /   Insurance ID: [id number] - (Medicare Managed) Secondary Insurance (if applicable):    Referring Physician: Risa Hdez MD     PCP: Valentin Morales MD Visits to Date/Visits Approved: 7 / 10    No Show/Cancelled Appts: 0 / 0     Medical Diagnosis: Radiculopathy, cervical region [M54.12]    Treatment Diagnosis: Cervical radiculopathy at C5        SUBJECTIVE EXAMINATION   Pain Level: Pain Screening  Patient Currently in Pain: Yes  Pain Location: Neck, Arm, Hand  Pain Orientation: Right  Pain Radiating Towards: Radiating into thumb and index finger. Pain Descriptors: Aching, Tightness, Tingling  Pain Frequency: Continuous    Patient Comments: Subjective: Pt. states she was able to try band exercises. Pt. states it made her arms alittle sore but otherwise ok. Pt. rates pain at 2/10 with tingling into R thumb and index finger. HEP Compliance: Fair        OBJECTIVE EXAMINATION   Restrictions:  No data recorded No data recorded No data recorded              TREATMENT     Exercises:      Treatment Reasoning    Exercise 1: cap D with shd shrug hold 3 sec x 10  Exercise 3: cervical retraction x 10 gentle  Exercise 5: UT stretch x 2 hold 30 sec  Exercise 9: B shd ext YTB x 10 VC and tactile cues for scap setting  Exercise 10: Radial nerve glide x 5 hold 5 sec less intense numbnesss cues for technique.   Exercise 11: Mid Rows x 6 YTB gentle abd bracing mild low back pain with standing VC and tactile cues for scap setting  Exercise 13: doorway stretch arms low and mid 30 sec x 3 ea  Exercise 14: tall sit inhale and exhale with abd bracing VC to limit accessory motion with hiking at shds                          Manual Therapy: (CPT 31489) Treatment Reasoning     Joint Mobilization: cervical distraction  Cervical mobs grade I-III to dec pain and improve mobility R>L, OA mobs cervical spine, good response to cervical traction at ~20deg angle symptoms centralized. Possible trial mechanical cervical traction next session if parasthesias still present. Pt Education: Plan Comment: KT tape       ASSESSMENT     Assessment: Assessment: Pt. needing VC and tactile cues with some therex including postural strengthening for scap setting and decreasing shd hiking. Limited by fatigue with strengthening thus deferred B ER this date and limited reps mid row. Pt. however more aware of posture overall. Good response to manual cervical traction with parasthesias centralized at 20 deg angle. Pt. could possibly benefit from mechanical cervical traction if parasthesias still present. Pt. denies pain or parasthesias post  Body Structures, Functions, Activity Limitations Requiring Skilled Therapeutic Intervention: Decreased functional mobility , Increased pain, Decreased posture, Decreased strength, Decreased ROM, Decreased ADL status    Post-Treatment Pain Level: Activity Tolerance: Patient tolerated treatment well; Patient limited by pain; Patient limited by endurance; Patient limited by fatigue    Therapy Prognosis: Good       GOALS   Patient goals : \"I would like my neck and right arm to feel better. \"  Short Term Goals Completed by 1-2 weeks Current Status Goal Status   Pt reporting any decrease in neck pain.        Pt reporting any decrease in RUE pain       Pt reporting HEP at least once a day 5/7 days to help ospoture and pain management of neck and RUE       Increase cervcial sidebend >= 5 deg and inc AROm shoudler abduction in standing B >= 5 deg                                                           Long Term Goals Completed

## 2022-09-16 ENCOUNTER — HOSPITAL ENCOUNTER (OUTPATIENT)
Dept: PHYSICAL THERAPY | Age: 67
Setting detail: THERAPIES SERIES
Discharge: HOME | End: 2022-09-16
Payer: MEDICARE

## 2022-09-16 PROCEDURE — 97140 MANUAL THERAPY 1/> REGIONS: CPT

## 2022-09-16 PROCEDURE — 97012 MECHANICAL TRACTION THERAPY: CPT

## 2022-09-16 PROCEDURE — 97110 THERAPEUTIC EXERCISES: CPT

## 2022-09-16 NOTE — PROGRESS NOTES
Physical Therapy: Daily Note   Patient: Riley Watson (26 y.o. female)   Examination Date:   Plan of Care/Certification Expiration Date: 22    No data recorded   :  1955 # of Visits since [facility]:   8   MRN: [id number]  CSN: [id number] Start of Care Date:   2022   Insurance: Payor: [facility] MEDICARE / Plan: Yaron Alex / Product Type: *No Product type* /   Insurance ID: [id number] - (Medicare Managed) Secondary Insurance (if applicable):    Referring Physician: Harper Hodgkin, MD     PCP: Albert White MD Visits to Date/Visits Approved: 8 / 10    No Show/Cancelled Appts: 0  0     Medical Diagnosis: Radiculopathy, cervical region [M54.12]    Treatment Diagnosis: Cervical radiculopathy at C5        SUBJECTIVE EXAMINATION   Pain Level:      Patient Comments: Subjective: I sm pretty sore and have been really sore and achy since yesterday 4/10. I had a hard time doing my HEP yesterday due to the ache. I felt pretty good the day I had therapy last visit throughout rest of the day. HEP Compliance: Good        OBJECTIVE EXAMINATION   Restrictions:  No data recorded No data recorded No data recorded              TREATMENT     Exercises:      Treatment Reasoning    Exercise 1: cap D with shd shrug hold 3 sec x 10  Exercise 3: cervical retraction x 10 gentle  Exercise 5: UT stretch x 2 hold 30 sec  Exercise 9: B shd ext YTB x 10 VC and tactile cues for scap setting  Exercise 10: Radial nerve glide x 5 hold 5 sec less intense numbnesss cues for technique. Exercise 11: Mid Rows x 10 YTB gentle abd bracing mild low back pain with standing VC and tactile cues for scap setting  Exercise 13: doorway stretch arms low and deferred mid this date 30 sec x 3 ea  Exercise 14: tall sit inhale and exhale with abd bracing VC to limit accessory motion with hiking at shds                          Manual Therapy: (CPT 72462) Treatment Reasoning     Joint Mobilization: Grade II cervical jt.  mobs, occipital release, and distraction intermitently to assist with decreasing tension and pain. Pt. reportsgood response. Soft Tissue Mobilizaton: STM with cervical paraspinals and UT to assist with decreasing tension. No compliants. Other: Cervical PROM in supine with side bends and rotation for gentle stretch. Pt. demo's more tightness with R UT with L side bend and R cervical rotation. Passive R UT and pectoralis minor to assist with decreasing tension. R UE oscilations to assist with decreasing R shoulder tension. Pt. reports good response with decrease tension. Modalities  Mechanical Traction: Cervical  (CPT Q5238794) Treatment Reasoning    Patient Position: Supine  Type of traction: Intermittent  Mechanical traction settings: Intensity 15lb / 5lbs, 20 degree tilt, speed 30%  Post treatment skin assessment: Redness - no adverse reaction Limitations addressed: Joint mobility, Tissue extensibility, Pain modulation                    Pt Education: Plan Comment: KT tape       ASSESSMENT     Assessment: Assessment: Pt. appears to report good response with ther ex with needing minimal vc's to correct form and set scapula during some ther ex to benefit with strength and ROM. Pt. reports decrease pain slightly post ther ex and continues to repond well with manual therapy. Pt. demo's stiffness with cervical and shoulder region. Followed with mechanical cervical traction with good response with reports of 1/10 pain post.  Body Structures, Functions, Activity Limitations Requiring Skilled Therapeutic Intervention: Decreased functional mobility , Increased pain, Decreased posture, Decreased strength, Decreased ROM, Decreased ADL status    Post-Treatment Pain Level: Activity Tolerance: Patient tolerated treatment well; Patient limited by pain; Patient limited by endurance;  Patient limited by fatigue    Therapy Prognosis: Good       GOALS   Patient goals : \"I would like my neck and right arm to feel better. \"  Short Term Goals Completed by 1-2 weeks Current Status Goal Status   Pt reporting any decrease in neck pain. Pt reporting any decrease in RUE pain       Pt reporting HEP at least once a day 5/7 days to help posture and pain management of neck and RUE       Increase cervcial sidebend >= 5 deg and inc AROM shoudler abduction in standing B >= 5 deg                                                           Long Term Goals Completed by 4-5 weeks Current Status Goal Status   Increase cervcial sidebend B >25degrees, increase shoulder flexion >120 degrees and abd >110 for better function and less pain at C4-6       Increase B shoulder strength >= 1/2 grade for better function and less neck/RUE pain, Posture with dec fwd head and dec B shoulder protraction >= 1/2 inch.        RUE symptoms decreased >= 50% per pt report wtioh less pain , less numbness and less tingling       NDI neck disability index decrease form 18/454= 46% disabilty at eval to <= 25%disability to demonstrate better function with less pain in neck and RUE       Pt competent advanced HEP for cervcial, shoulder and posture                                                    TREATMENT PLAN   Plan Frequency: 1-2  Plan weeks: 4-5  Current Treatment Recommendations: Strengthening, ROM, Functional mobility training, Manual Therapy - Soft Tissue Mobilization, Safety education & training, Home exercise program, Modalities, Positioning, Patient/Caregiver education & training, Pain management   Plan Comment: JOSE MANUEL tape       Therapy Time  Individual Time In:   800      Individual Time Out:  900  Minutes:  60        Electronically signed by Sherry Hancock PTA  on 9/16/2022 at 9:01 AM   POC NOTE  Physical Therapy

## 2022-09-21 ENCOUNTER — HOSPITAL ENCOUNTER (OUTPATIENT)
Dept: PHYSICAL THERAPY | Age: 67
Setting detail: THERAPIES SERIES
Discharge: HOME | End: 2022-09-21
Payer: MEDICARE

## 2022-09-21 NOTE — PROGRESS NOTES
Physical Therapy: Daily Note   Patient: Elgin Diaz (51 y.o. female)   Examination Date:   Plan of Care/Certification Expiration Date: 22    No data recorded   :  1955 # of Visits since [facility]:   9   MRN: [id number]  CSN: [id number] Start of Care Date:   2022   Insurance: Payor: [facility] MEDICARE / Plan: Romaine Kearns / Product Type: *No Product type* /   Insurance ID: [id number] - (Medicare Managed) Secondary Insurance (if applicable):    Referring Physician: Aparna Cummings MD     PCP: Margo Coello MD Visits to Date/Visits Approved:   /      No Show/Cancelled Appts:   /       Medical Diagnosis: Radiculopathy, cervical region [M54.12]    Treatment Diagnosis: Cervical radiculopathy at C5        PT dept cx appt this date due to ill PTA and no other coverage available    Therapy Time  Individual Time In:         Individual Time Out:    Minutes:  0 yvrose Golden  cx appt        Electronically signed by Alexandre Garces PT  on 2022 at 7:09 AM   POC NOTE

## 2022-09-23 ENCOUNTER — HOSPITAL ENCOUNTER (OUTPATIENT)
Dept: PHYSICAL THERAPY | Age: 67
Setting detail: THERAPIES SERIES
Discharge: HOME | End: 2022-09-23
Payer: MEDICARE

## 2022-09-23 PROCEDURE — 97110 THERAPEUTIC EXERCISES: CPT

## 2022-09-23 PROCEDURE — 97140 MANUAL THERAPY 1/> REGIONS: CPT

## 2022-09-23 PROCEDURE — 97530 THERAPEUTIC ACTIVITIES: CPT

## 2022-09-23 NOTE — PROGRESS NOTES
TONSILLECTOMY      age 24       Medications:   Current Outpatient Medications:     insulin glargine (LANTUS SOLOSTAR) 100 UNIT/ML injection pen, INJECT SUBCUTANEOUSLY 30  UNITS TWICE DAILY, Disp: 60 mL, Rfl: 1    venlafaxine (EFFEXOR XR) 150 MG extended release capsule, TAKE 1 CAPSULE BY MOUTH  DAILY, Disp: 90 capsule, Rfl: 3    metFORMIN (GLUCOPHAGE-XR) 500 MG extended release tablet, TAKE 1 TABLET BY MOUTH  TWICE DAILY, Disp: 180 tablet, Rfl: 1    clopidogrel (PLAVIX) 75 MG tablet, TAKE 1 TABLET BY MOUTH AT  NIGHT, Disp: 90 tablet, Rfl: 3    Insulin Pen Needle (MEIJER PEN NEEDLES) 31G X 6 MM MISC, 1 each by Does not apply route daily, Disp: 100 each, Rfl: 3    lisinopril (PRINIVIL;ZESTRIL) 10 MG tablet, TAKE 1 TABLET BY MOUTH AT  NIGHT, Disp: 90 tablet, Rfl: 3    simvastatin (ZOCOR) 40 MG tablet, TAKE 1 TABLET BY MOUTH AT  NIGHT, Disp: 90 tablet, Rfl: 2    blood glucose monitor strips, Use TID, Disp: 100 strip, Rfl: 11    Lancets MISC, 1 each by Does not apply route 3 times daily, Disp: 100 each, Rfl: 11    meloxicam (MOBIC) 15 MG tablet, Take 1 tablet by mouth daily, Disp: 90 tablet, Rfl: 3    omeprazole (PRILOSEC) 40 MG delayed release capsule, Take 1 capsule by mouth daily, Disp: 90 capsule, Rfl: 3    Lancet Device MISC, 1 Device by Does not apply route 4 times daily (before meals and nightly) Test 4x/day., Disp: 1 each, Rfl: 0    insulin lispro (HUMALOG KWIKPEN) 200 UNIT/ML SOPN pen, INJECT SUBCUTANEOUSLY 3  UNITS FOR EVERY 15 GRAMS OF CARBOHYDRATES; 65-70 UNITS  DAILY, Disp: 36 mL, Rfl: 3    blood glucose monitor kit and supplies, Use TID, Disp: 1 kit, Rfl: 0    tiZANidine (ZANAFLEX) 4 MG tablet, Take 4 mg by mouth nightly, Disp: , Rfl: 0    acetaminophen-codeine (TYLENOL/CODEINE #4) 300-60 MG per tablet, Take 1 tablet by mouth 3 times daily as needed. ., Disp: , Rfl:   Allergies: Aspirin and Penicillins      SUBJECTIVE EXAMINATION      ,           Subjective History:  Onset Date: 03/01/22     Additional Pertinent Hx (if applicable):            Learning/Language:       Pain Screening        Functional Status         Social History:       Occupation/Interests:       Prior Level of Function:             Current Level of Function:                 OBJECTIVE EXAMINATION   Restrictions:             Review of Systems:       VBI Screening / Lumbar Screening:        Regional Screen:         Observations:       Palpation:        Ambulation/Gait (if applicable):       Balance Screen:     Left AROM  Right AROM         AROM LUE (degrees)  L Shoulder ABduction 0-180: 0-110 deg before scaption, no pain inc    AROM RUE (degrees)  R Shoulder ABduction 0-180: 0-112 deg before scaption, no pain inc       Left Strength  Right Strength      General Strength Testing UE:  (R handed)  Strength LUE  L Shoulder Flexion: 4+/5  L Shoulder Extension: 4+/5  L Shoulder ABduction: 4/5  L Shoulder Internal Rotation: 4+/5  L Shoulder External Rotation: 4/5  L Elbow Flexion: 4+/5  L Elbow Extension: 4/5, 4+/5 General Strength Testing UE:  (R handed)  Strength RUE  R Shoulder Flexion: 4/5, 4+/5  R Shoulder Extension: 4+/5  R Shoulder ABduction: 4/5  R Shoulder Internal Rotation: 4+/5  R Shoulder External Rotation: 4-/5, 4/5  R Elbow Flexion: 4/5, 4+/5  R Elbow Extension: 4/5, 4+/5     Cervical Assessment   AROM Cervical Spine   Cervical spine general AROM: flex 42 deg, inc 8 deg;  extext with slight cervical retractoin 18 deg, inc of 7 deg; LSB 18 deg, inc 6 deg- inc pain on right ; RSB 18 deg- loss of 4 deg withinc pain on R; RROT 55 deg tension in trapinc 4 deg ; LROT 53 deg tension in trap inc 5 deg           Special Tests:    NDI= neck disaiblity index 16/45= 35% disability     ASSESSMENT     Impression: Assessment: 67yof who has met 3/4 STG and paritally met 5/5 LTG regarding neck pain and URUE pain. Pt has had 25% reduction in pain in neck and 25% reduciton in RUE symptoms. Pt retturns to MD [facility] 9/26 for follow up.  Recommend pt PT be on hold x 1month pending MD visit- may want further testing MRI and pain management consult- pt has pain management and injecitons for low back already. Pt with better posture awareness increased neck ROM, great increase in shoulder ROM and some reduction in pain in neck and RUE- reports able to drvie better. Educated gabriellear roll with driving for better psoture and lessneck strain. Overall pt doing some better but staying mostly at 2-4 pain in neck and RUE. Pt wishes to hold PT pending MD visit. If continued PT recommend 1-2x week for 4-5 more weeks or 10more visits to achieve LTG. Pt to continue HEP, ice and purchase cervical traction pillow. Able toadd nerve glide Tray stretch to HEP this date. Body Structures, Functions, Activity Limitations Requiring Skilled Therapeutic Intervention: Decreased functional mobility , Increased pain, Decreased posture, Decreased strength, Decreased ROM, Decreased ADL status    Statement of Medical Necessity: Physical Therapy is both indicated and medically necessary as outlined in the POC to increase the likelihood of meeting the functionally related goals stated below. Patient's Activity Tolerance: Patient limited by pain, Patient tolerated treatment well      Patient's rehabilitation potential/prognosis is considered to be: Good    Factors which may impact rehabilitation potential include:          GOALS   Patient Goal(s): \"I would like my neck and right arm to feel better. \" 9/23/22- in progress with some dec in arm and neck pain  Short Term Goals Completed by 1-2 weeks Goal Status   Pt reporting any decrease in neck pain.  Met   Pt reporting any decrease in RUE pain Met   Pt reporting HEP at least once a day 5/7 days to help posture and pain management of neck and RUE Met   in progress 9/23/22 sidebend cervical  inc 4 deg right and 6 deg left; met shoulder abd bilaterally  R 112, L 110 Partially met                                           Long Term Goals Completed by 4-5 weeks Goal Status   Increase cervcial sidebend B >25degrees, increase shoulder flexion >120 degrees and abd >110 for better function and less pain at C4-6 Partially met   Increase B shoulder strength >= 1/2 grade for better function and less neck/RUE pain, Posture with dec fwd head and dec B shoulder protraction >= 1/2 inch.  Partially met   RUE symptoms decreased >= 50% per pt report wtioh less pain , less numbness and less tingling Partially met   NDI neck disability index decrease form 18/45= 46% disabilty at eval to <= 25%disability to demonstrate better function with less pain in neck and RUE Partially met   Pt competent advanced HEP for cervcial, shoulder and posture Partially met                                      TREATMENT PLAN            Pt. actively involved in establishing Plan of Care and Goals: Yes  Patient/ Caregiver education and instruction: Goals, PT Role, Plan of Care, Evaluative findings Hold PT x 1 month,Tray nerve glide stretch, home cervical traction pillow if able to purchase  KT tape        Treatment may include any combination of the following: Strengthening, ROM, Functional mobility training, Manual Therapy - Soft Tissue Mobilization, Safety education & training, Home exercise program, Modalities, Positioning, Patient/Caregiver education & training, Pain management     Frequency / Duration:  Patient to be seen  (9/23/22 hold PT one month- if MD wants further PT 1-2x week for 4-5 more weeks or 10 viits) for 8-10 weeks if MD wants pt to do more PT form rechekc 9/23/22 weeks      Eval Complexity:         PT Treatment Completed:  Exercises:      Treatment Reasoning    Exercise 1: cap D with shd shrug hold 3 sec x 10  Exercise 3: cervical retraction x 10 gentle  Exercise 5: UT stretch x 2 hold 30 sec  Exercise 6: *Levator stretch x 2 H30  Exercise 15: tray stretch- nerve glide B UE 3 hold x 5 reps                          Therapeutic Activities: (CPT 48372) Treatment Reasoning     Education goals , plan, hold PT until MD visit, progress, continued HEP, neck cervical traction pillow                      Manual Therapy: (CPT 74498) Treatment Reasoning     Manual Traction: gentle cervical distractiona t 20 deg flex with some relief, cervical distraction with sidebnnd and pec presure for stretch 15 sec x 2 each side with good relief of pain  Soft Tissue Mobilizaton: MFR upper traps, cervicla paraspinals and subocciput  Other: Educated purchase of home cervical traciton pillow to use 10-30 minutes at night for pain rleif- pt to try to see if Cinthia Ramos carries or will order for pt as doesn't use [employer]/ [employer]                      Modalities  Cryotherapy: (CPT 19311) Treatment Reasoning    Patient Position:  (pt to ice at home)                      Therapy Time  Individual Time In:    800     Individual Time Out:  900  Minutes:  60        Therapist Signature: An Figueroa, PT    Date: 9/44/1976     I certify that the above Therapy Services are being furnished while the patient is under my care. I agree with the treatment plan and certify that this therapy is necessary. [de-identified] Signature:  ___________________________   Date:_______                                                                   Serafin James MD        Physician Comments: _______________________________________________    Please sign and return to [facility]. Please fax to the location listed below.  Pr[facility] for this referral!    [facility]  [address] [place] [address] [address] [id number]  Dept: [phone]  Dept Fax: [phone] : [phone]       POC NOTE

## 2022-09-25 NOTE — TELEPHONE ENCOUNTER
Pharmacy is requesting medication refill.  Please approve or deny this request.    Rx requested:  Requested Prescriptions     Pending Prescriptions Disp Refills    simvastatin (ZOCOR) 40 MG tablet [Pharmacy Med Name: Simvastatin 40 MG Oral Tablet] 90 tablet 3     Sig: Take 1 tablet by mouth nightly         Last Office Visit:   7/7/2022      Next Visit Date:  Future Appointments   Date Time Provider Tran Horne   9/26/2022  3:00 PM Marla Mcneil PA-C [address]   10/19/2022 12:45 PM Indigo Sheehan MD [facility]   1/9/2023  8:00 AM Gui Hahn MD [address] [place] [address]

## 2022-09-26 ENCOUNTER — OFFICE VISIT (OUTPATIENT)
Dept: ORTHOPEDIC SURGERY | Age: 67
End: 2022-09-26
Payer: MEDICARE

## 2022-09-26 VITALS
HEIGHT: 63 IN | OXYGEN SATURATION: 96 % | TEMPERATURE: 98.9 F | BODY MASS INDEX: 33.31 KG/M2 | HEART RATE: 60 BPM | WEIGHT: 188 LBS

## 2022-09-26 DIAGNOSIS — M54.12 CERVICAL RADICULOPATHY AT C5: Primary | ICD-10-CM

## 2022-09-26 PROCEDURE — 99214 OFFICE O/P EST MOD 30 MIN: CPT | Performed by: PHYSICIAN ASSISTANT

## 2022-09-26 PROCEDURE — 1123F ACP DISCUSS/DSCN MKR DOCD: CPT | Performed by: PHYSICIAN ASSISTANT

## 2022-09-26 RX ORDER — SIMVASTATIN 40 MG
40 TABLET ORAL NIGHTLY
Qty: 90 TABLET | Refills: 3 | Status: SHIPPED | OUTPATIENT
Start: 2022-09-26

## 2022-09-26 NOTE — PROGRESS NOTES
[facility]      Subjective:      Chief Complaint   Patient presents with    Follow-up     F/U right arm. Patient states she feels numbness in her right arm. Pt states she has pain in her right shoulder. Patient rates pain a 3/10. Pt states physical therapy has made her feel worse and did not help her. HPI: Dinesh Segura is a 74 y.o. female who is here for neck pain follow-up. She does have radiculopathy-like symptoms that runs into her thumb and extends all way up to her upper shoulder along the medial aspect of her forearm. She has tried therapy which did help with her motion but actually made her radiculopathy-like symptoms worse. Her steroid did help for about 5 days.     Past Medical History:   Diagnosis Date    Arthritis     Cerebral artery occlusion with cerebral infarction [facility]) 2016 June    TIA / sx left sided weakness & fell & unable to get up off floor for several hours    COPD (chronic obstructive pulmonary disease) ([facility] [address]     smoking habit > 40 yrs    Depression     Headache     Hyperlipidemia     meds > 10 yrs    Hypertension     meds since TIA / 6/2016    Neuropathy     POTS (postural orthostatic tachycardia syndrome)     2000's    Restless legs syndrome     Type 2 diabetes mellitus without complication (HCC)     hx > 7 yrs      Past Surgical History:   Procedure Laterality Date    ABDOMINAL EXPLORATION SURGERY  1970s    due to abdominal pain / no definite dx    BACK SURGERY  12/24/2018    lumbar disc OR at [address]    due to tendonitis    ENDOMETRIAL ABLATION  1990s    AUB    INSERT/ REPLACE INFUSN PUMP,PROGRAMMABLE N/A 05/29/2019    REMOVAL OF INTRATHECAL PUMP RESERVOIR performed by Leigh Ann Cuello MD at [address]      pain pump placement 2010 & replacement 2016    SKIN BIOPSY  03/2022    on a cyst on neck(benign)    TONSILLECTOMY      age 24     Social History     Socioeconomic History Marital status:      Spouse name: Not on file    Number of children: Not on file    Years of education: Not on file    Highest education level: Not on file   Occupational History    Not on file   Tobacco Use    Smoking status: Every Day     Packs/day: 1.00     Years: 44.00     Pack years: 44.00     Types: Cigarettes     Start date: 67    Smokeless tobacco: Never   Substance and Sexual Activity    Alcohol use: No    Drug use: No    Sexual activity: Not on file   Other Topics Concern    Not on file   Social History Narrative    Not on file     Social Determinants of Health     Financial Resource Strain: Low Risk     Difficulty of Paying Living Expenses: Not hard at all   Food Insecurity: No Food Insecurity    Worried About Running Out of Food in the Last Year: Never true    Ran Out of Food in the Last Year: Never true   Transportation Needs: Not on file   Physical Activity: Inactive    Days of Exercise per Week: 0 days    Minutes of Exercise per Session: 0 min   Stress: Not on file   Social Connections: Not on file   Intimate Partner Violence: Not on file   Housing Stability: Not on file     Family History   Problem Relation Age of Onset    Stroke Mother     High Blood Pressure Mother     High Cholesterol Mother     Diabetes Father         Pre    Heart Attack Father     Cancer Maternal Grandmother         colon    Cancer Maternal Grandfather     Alzheimer's Disease Paternal Grandmother     Alzheimer's Disease Paternal Grandfather     Diabetes Paternal Grandfather     COPD Sister      Allergies   Allergen Reactions    Aspirin Swelling    Penicillins Rash     Current Outpatient Medications on File Prior to Visit   Medication Sig Dispense Refill    simvastatin (ZOCOR) 40 MG tablet Take 1 tablet by mouth nightly 90 tablet 3    insulin glargine (LANTUS SOLOSTAR) 100 UNIT/ML injection pen INJECT SUBCUTANEOUSLY 30  UNITS TWICE DAILY 60 mL 1    venlafaxine (EFFEXOR XR) 150 MG extended release capsule TAKE 1 CAPSULE BY MOUTH  DAILY 90 capsule 3    metFORMIN (GLUCOPHAGE-XR) 500 MG extended release tablet TAKE 1 TABLET BY MOUTH  TWICE DAILY 180 tablet 1    clopidogrel (PLAVIX) 75 MG tablet TAKE 1 TABLET BY MOUTH AT  NIGHT 90 tablet 3    Insulin Pen Needle (MEIJER PEN NEEDLES) 31G X 6 MM MISC 1 each by Does not apply route daily 100 each 3    lisinopril (PRINIVIL;ZESTRIL) 10 MG tablet TAKE 1 TABLET BY MOUTH AT  NIGHT 90 tablet 3    blood glucose monitor strips Use  strip 11    Lancets MISC 1 each by Does not apply route 3 times daily 100 each 11    meloxicam (MOBIC) 15 MG tablet Take 1 tablet by mouth daily 90 tablet 3    omeprazole (PRILOSEC) 40 MG delayed release capsule Take 1 capsule by mouth daily 90 capsule 3    Lancet Device MISC 1 Device by Does not apply route 4 times daily (before meals and nightly) Test 4x/day. 1 each 0    insulin lispro (HUMALOG KWIKPEN) 200 UNIT/ML SOPN pen INJECT SUBCUTANEOUSLY 3  UNITS FOR EVERY 15 GRAMS OF CARBOHYDRATES; 65-70 UNITS  DAILY 36 mL 3    blood glucose monitor kit and supplies Use TID 1 kit 0    tiZANidine (ZANAFLEX) 4 MG tablet Take 4 mg by mouth nightly  0    acetaminophen-codeine (TYLENOL #4) 300-60 MG per tablet Take 1 tablet by mouth 3 times daily as needed. .       No current facility-administered medications on file prior to visit. Objective:   Pulse 60   Temp 98.9 °F (37.2 °C) (Temporal)   Ht 5' 3\" (1.6 m)   Wt 188 lb (85.3 kg)   LMP 05/24/1999   SpO2 96%   BMI 33.30 kg/m²       Radiographs and Laboratory Studies:   Previous diagnostic imaging studies were reviewed. Narrative   EXAMINATION:  CERVICAL SPINE. CLINICAL HISTORY:  RADICULAR PAIN. COMPARISONS:  NONE AVAILABLE       TECHNIQUE:  AP, lateral, oblique and odontoid views. FINDINGS:  Vertebral bodies are in normal alignment. There is considerable narrowing of the C5-6 interspace and moderate narrowing of the C6-7 interspace.  There is mild to slightly more pronounced degenerative arthritis. No acute traumatic or bony    destructive changes demonstrated. Impression   MILD TO MODERATE DEGENERATIVE ARTHRITIS. Laboratory Studies:   Lab Results   Component Value Date    WBC 13.8 (H) 05/24/2019    HGB 15.2 05/24/2019    HCT 44.9 05/24/2019    MCV 88.4 05/24/2019     05/24/2019     No results found for: SEDRATE  No results found for: CRP    Assessment and Plan:      Diagnosis Orders   1. Cervical radiculopathy at C5  MRI CERVICAL SPINE WO CONTRAST          Here for C-spine radiculopathy-like follow-up. She went to therapy which helped with her motion. She took steroids which did help with her pain however relapsed after taking such. She has been taking anti-inflammatories without significant relief. She has radiculopathy-like symptoms likely from C5-C6 [place] on the medial aspect of the shoulder area out to the lateral portion of the forearm and into her first and second digits. Because this is not getting any better after all conservative management we will go ahead and get an MRI of the cervical spine. I will give her a call when I get those results. She will likely need to see Dr. Jaya Hale for potential surgery     The above plan was discussed in thorough detail with Dr. Kelly Anne and the patient. No orders of the defined types were placed in this encounter. No orders of the defined types were placed in this encounter. No follow-ups on file.     Steph Roberto PA-C  [facility]  [phone]

## 2022-09-28 DIAGNOSIS — E11.9 DIABETES MELLITUS TYPE 2 WITHOUT RETINOPATHY (HCC): ICD-10-CM

## 2022-09-28 RX ORDER — INSULIN GLARGINE 100 [IU]/ML
INJECTION, SOLUTION SUBCUTANEOUS
Qty: 60 ML | Refills: 3 | Status: SHIPPED | OUTPATIENT
Start: 2022-09-28

## 2022-09-28 NOTE — TELEPHONE ENCOUNTER
Comments:     Last Office Visit (last PCP visit):   7/7/2022    Next Visit Date:  Future Appointments   Date Time Provider Tran Horne   10/16/2022  8:30 AM MARY JANE MRI ROOM 1 MAL MRI MAL Fac RAD   10/19/2022 12:45 PM Yuliana Garcia MD [facility]   1/9/2023  8:00 AM Margo Coello MD [place]       **If hasn't been seen in over a year OR hasn't followed up according to last diabetes/ADHD visit, make appointment for patient before sending refill to provider.     Rx requested:  Requested Prescriptions     Pending Prescriptions Disp Refills    LANTUS SOLOSTAR 100 UNIT/ML injection pen [Pharmacy Med Name: Lantus SoloStar 100 UNIT/ML Subcutaneous Solution Pen-injector] 60 mL 3     Sig: INJECT SUBCUTANEOUSLY 30  UNITS TWICE DAILY

## 2022-10-12 DIAGNOSIS — G89.29 CHRONIC MIDLINE LOW BACK PAIN WITH LEFT-SIDED SCIATICA: ICD-10-CM

## 2022-10-12 DIAGNOSIS — M54.42 CHRONIC MIDLINE LOW BACK PAIN WITH LEFT-SIDED SCIATICA: ICD-10-CM

## 2022-10-12 RX ORDER — MELOXICAM 15 MG/1
15 TABLET ORAL DAILY
Qty: 90 TABLET | Refills: 3 | Status: SHIPPED | OUTPATIENT
Start: 2022-10-12

## 2022-10-12 NOTE — TELEPHONE ENCOUNTER
Comments:     Last Office Visit (last PCP visit):   7/7/2022    Next Visit Date:  Future Appointments   Date Time Provider Tran Horne   10/16/2022  8:30 AM MARY JANE MRI ROOM 1 [facility] MAL Fac RAD   10/19/2022 12:45 PM Carlos Horan MD [address] [place] [address]   1/9/2023  8:00 AM Shaw Reynoso MD [facility]       **If hasn't been seen in over a year OR hasn't followed up according to last diabetes/ADHD visit, make appointment for patient before sending refill to provider.     Rx requested:  Requested Prescriptions     Pending Prescriptions Disp Refills    meloxicam (MOBIC) 15 MG tablet [Pharmacy Med Name: Meloxicam 15 MG Oral Tablet] 90 tablet 3     Sig: TAKE 1 TABLET BY MOUTH  DAILY

## 2022-10-16 ENCOUNTER — HOSPITAL ENCOUNTER (OUTPATIENT)
Dept: MRI IMAGING | Age: 67
Discharge: HOME | End: 2022-10-18
Payer: MEDICARE

## 2022-10-16 DIAGNOSIS — M54.12 CERVICAL RADICULOPATHY AT C5: ICD-10-CM

## 2022-10-16 PROCEDURE — 72141 MRI NECK SPINE W/O DYE: CPT

## 2022-10-17 DIAGNOSIS — K21.9 GASTROESOPHAGEAL REFLUX DISEASE: ICD-10-CM

## 2022-10-17 RX ORDER — OMEPRAZOLE 40 MG/1
40 CAPSULE, DELAYED RELEASE ORAL DAILY
Qty: 90 CAPSULE | Refills: 3 | Status: SHIPPED | OUTPATIENT
Start: 2022-10-17

## 2022-10-17 NOTE — TELEPHONE ENCOUNTER
Comments:     Last Office Visit (last PCP visit):   7/7/2022    Next Visit Date:  Future Appointments   Date Time Provider Tran Coleen   11/4/2022 11:45 AM Mindy Davis MD [address] [place] [address]   1/9/2023  8:00 AM Rosalio Santo MD [facility]       **If hasn't been seen in over a year OR hasn't followed up according to last diabetes/ADHD visit, make appointment for patient before sending refill to provider.     Rx requested:  Requested Prescriptions     Pending Prescriptions Disp Refills    omeprazole (PRILOSEC) 40 MG delayed release capsule [Pharmacy Med Name: Omeprazole 40 MG Oral Capsule Delayed Release] 90 capsule 3     Sig: TAKE 1 CAPSULE BY MOUTH  DAILY

## 2022-10-25 ENCOUNTER — SCHEDULED TELEPHONE ENCOUNTER (OUTPATIENT)
Dept: ORTHOPEDIC SURGERY | Age: 67
End: 2022-10-25
Payer: MEDICARE

## 2022-10-25 DIAGNOSIS — M54.12 CERVICAL RADICULOPATHY AT C5: Primary | ICD-10-CM

## 2022-10-25 PROCEDURE — 99442 PR PHYS/QHP TELEPHONE EVALUATION 11-20 MIN: CPT | Performed by: PHYSICIAN ASSISTANT

## 2022-10-25 NOTE — PROGRESS NOTES
Catia Oneill is a 74 y.o. female evaluated via telephone on 10/25/2022. Consent:  She and/or health care decision maker is aware that that she may receive a bill for this telephone service, which includes applicable co-pays, depending on her insurance coverage, and has provided verbal consent to proceed. Documentation:      Jo-Ann Breath to discuss her MRI findings via virtual televisit. Still having some soreness after 6 weeks of therapy in the neck area that extends into her shoulder. She does have numbness and tingling extends on the lateral portion of her arm, forearm and into her first and second digits. This does indeed correlate with the MRI findings of foraminal stenosis specially tear at C5 and 6. Discussed pros mallear treatment is which include potential injections with pain management, further therapy, further anti-inflammatories. May also be a surgical candidate. She will need to follow-up with Dr. Dallin Byrne to discuss further modalities treatment at this point. TECHNIQUE:   Multiplanar multisequence MRI of the cervical spine was performed without the   administration of intravenous contrast.       COMPARISON:   None. HISTORY:   ORDERING SYSTEM PROVIDED HISTORY: Cervical radiculopathy at C5   TECHNOLOGIST PROVIDED HISTORY:   What reading provider will be dictating this exam?->CRC       FINDINGS:   BONES/ALIGNMENT: There is normal alignment of the spine. The vertebral body   heights are maintained. The bone marrow signal appears unremarkable. SPINAL CORD: No abnormal cord signal is seen. SOFT TISSUES: No paraspinal mass identified. C2-C3: Disc desiccation with minimal disc bulge left-sided facet hypertrophy   no central canal stenosis. Moderate left neural foraminal stenosis. C3-C4: Disc desiccation with a minimal disc bulge. Right greater than left   facet hypertrophy. No central canal stenosis. Mild neural foraminal   stenoses.        C4-C5: Disc desiccation with a disc bulge. Mild facet and uncovertebral   joint hypertrophy. Mild central canal stenosis. Moderate to severe right   and moderate left neural foraminal stenoses. C5-C6: Moderate loss of disc height with a disc osteophyte complex. Facet   and uncovertebral joint hypertrophic changes are noted. Moderate central   canal stenosis. Severe neural foraminal stenoses. C6-C7: Mild loss of disc height with a disc bulge. Right greater than left   uncovertebral joint hypertrophy. Mild bilateral facet hypertrophy. Mild   central canal stenosis. Moderate the right and mild left neural foraminal   stenoses. C7-T1: Disc desiccation with minimal disc bulge. No significant central   canal or neural foraminal stenosis. Impression   1. No fracture or bony destructive lesion. 2. Moderate central canal stenosis at C5-6. Mild stenoses at C4-5 and C6-7.   3.  Multilevel neural foraminal stenoses, worst (severe) at C5-6 bilaterally. RECOMMENDATIONS:   Unavailable       I affirm this is a Patient Initiated Episode with a Patient who has not had a related appointment within my department in the past 7 days or scheduled within the next 24 hours. Patient identification was verified at the start of the visit: Yes    Total Time: minutes: 11-20 minutes    The patient was evaluated through a synchronous/real-time VV encounter. The patient and/or guardian is aware that this is a billable service which includes applicable co-pays. The virtual visit was conducted with patient consent. The visit was conducted pursuant to the emergency declaration under the [place] act and national emergencies act [facility] authority and the coronavirus preparedness and response supplemental appropriateness act. Patient identification was verified, and a caregiver was present when appropriate.   The patient was located in a state where the provider was licensed to provide this care.    Note: not billable if this call serves to triage the patient into an appointment for the relevant concern      Mayank Jerez PA-C

## 2022-10-28 ENCOUNTER — OFFICE VISIT (OUTPATIENT)
Dept: ORTHOPEDIC SURGERY | Age: 67
End: 2022-10-28
Payer: MEDICARE

## 2022-10-28 VITALS
HEART RATE: 96 BPM | BODY MASS INDEX: 34.02 KG/M2 | OXYGEN SATURATION: 98 % | WEIGHT: 192 LBS | SYSTOLIC BLOOD PRESSURE: 136 MMHG | HEIGHT: 63 IN | DIASTOLIC BLOOD PRESSURE: 57 MMHG | RESPIRATION RATE: 16 BRPM

## 2022-10-28 DIAGNOSIS — M47.22 CERVICAL SPONDYLOSIS WITH RADICULOPATHY: Primary | ICD-10-CM

## 2022-10-28 PROCEDURE — 1123F ACP DISCUSS/DSCN MKR DOCD: CPT | Performed by: ORTHOPAEDIC SURGERY

## 2022-10-28 PROCEDURE — 3078F DIAST BP <80 MM HG: CPT | Performed by: ORTHOPAEDIC SURGERY

## 2022-10-28 PROCEDURE — 3074F SYST BP LT 130 MM HG: CPT | Performed by: ORTHOPAEDIC SURGERY

## 2022-10-28 PROCEDURE — 99214 OFFICE O/P EST MOD 30 MIN: CPT | Performed by: ORTHOPAEDIC SURGERY

## 2022-10-28 NOTE — PROGRESS NOTES
Subjective:      Patient ID: Adele Tapia is a 74 y.o. female who presents today for:  Chief Complaint   Patient presents with    Consultation     Patient is here for a consult on her neck and would like to discuss her MRI results. HPI  72-year-old woman here today for evaluation of numbness and tingling in the right arm. She has previously been seen by both Dr. Aleena Maza as well as our physicians assistant [address]. She has a pain management physician Dr. Nina Tatum. She also has a problem with her lower back. For 8 months, or longer, she has been experiencing pain in the right lateral arm, right dorsal forearm. She was given a course of oral steroids which got rid of the pain but left her with a pins, needles, tingling sensation in the same pattern of her arm. No left arm symptoms. She does have neck pain. She has headaches. She has had physical therapy. She is tried nonsteroidal anti-inflammatory agents. She denies any weakness in her arms. No loss of bowel or bladder control. No fever or chills. She smokes a pack of cigarettes a day.     Past Medical History:   Diagnosis Date    Arthritis     Cerebral artery occlusion with cerebral infarction [facility]) 2016 June    TIA / sx left sided weakness & fell & unable to get up off floor for several hours    COPD (chronic obstructive pulmonary disease) ([address]     smoking habit > 40 yrs    Depression     Headache     Hyperlipidemia     meds > 10 yrs    Hypertension     meds since TIA / 6/2016    Neuropathy     POTS (postural orthostatic tachycardia syndrome)     2000's    Restless legs syndrome     Type 2 diabetes mellitus without complication (HCC)     hx > 7 yrs     Past Surgical History:   Procedure Laterality Date    ABDOMINAL EXPLORATION SURGERY  1970s    due to abdominal pain / no definite dx    BACK SURGERY  12/24/2018    lumbar disc OR at [facility] [address] Right 1989    due to tendonitis    [facility] INSERT/ REPLACE INFUSN PUMP,PROGRAMMABLE N/A 05/29/2019    REMOVAL OF INTRATHECAL PUMP RESERVOIR performed by Juliana Swenson MD at [address]      pain pump placement 2010 & replacement 2016    SKIN BIOPSY  03/2022    on a cyst on neck(benign)    TONSILLECTOMY      age 24     Social History     Socioeconomic History    Marital status:      Spouse name: Not on file    Number of children: Not on file    Years of education: Not on file    Highest education level: Not on file   Occupational History    Not on file   Tobacco Use    Smoking status: Every Day     Packs/day: 1.00     Years: 44.00     Pack years: 44.00     Types: Cigarettes     Start date: 67    Smokeless tobacco: Never   Substance and Sexual Activity    Alcohol use: No    Drug use: No    Sexual activity: Not on file   Other Topics Concern    Not on file   Social History Narrative    Not on file     Social Determinants of Health     Financial Resource Strain: Not on file   Food Insecurity: Not on file   Transportation Needs: Not on file   Physical Activity: Inactive    Days of Exercise per Week: 0 days    Minutes of Exercise per Session: 0 min   Stress: Not on file   Social Connections: Not on file   Intimate Partner Violence: Not on file   Housing Stability: Not on file     Family History   Problem Relation Age of Onset    Stroke Mother     High Blood Pressure Mother     High Cholesterol Mother     Diabetes Father         Pre    Heart Attack Father     Cancer Maternal Grandmother         colon    Cancer Maternal Grandfather     Alzheimer's Disease Paternal Grandmother     Alzheimer's Disease Paternal Grandfather     Diabetes Paternal Grandfather     COPD Sister      Allergies   Allergen Reactions    Aspirin Swelling    Penicillins Rash         Review of Systems  See HPI    Objective:   BP (!) 136/57 (Site: Left Upper Arm, Position: Sitting, Cuff Size: Large Adult)   Pulse 96   Resp 16   Ht 5' 3\" (1.6 m)   Wt 192 lb (87.1 kg)   [facility] 05/24/1999   SpO2 98%   BMI 34.01 kg/m²     Physical Exam :  Pleasant woman BMI of 34. She is able to stand from a seated position. She is able to walk with a normal tandem gait. Cervical spine motion was intact. She negative Spurling sign. Shoulder motion was intact. Manual motor testing 5/5 for deltoid, bicep, tricep, wrist extensors and flexors, finger flexors and interossei. Sensation was intact to pinwheel testing both arms. Deep tendon reflexes were intact for bicep, brachialis and tricep. Negative Myles response both her extremities. No clonus in lower extremities    Radiographs and Laboratory Studies:     Diagnostic Imaging Studies:    MRI cervical spine. There is loss of cervical lordosis. There is disc degeneration C5-6 and C6-7. There are some inflammatory changes between C1 and C2. There is a right disc-osteophyte complex at C5-6 with moderate canal stenosis at C5-6 and severe foraminal stenosis bilaterally. Other other areas of foraminal stenosis such as 4-5 however she does not have signs of C5 radiculopathy. Assessment:       Diagnosis Orders   1. Cervical spondylosis with radiculopathy              Plan:      I had a lengthy discussion with her regarding treatment options. First and foremost, if she were to elect on cervical spine surgery. She would need to be off all nicotine products for at least 3 weeks prior to surgery, some insurance companies would say 6 weeks prior to surgery and then for 3 months after surgery. If she is not willing to do so then she should not have this surgery. Discussed fusion rates with and without nicotine for single level fusion, those with nicotine 60% in those without nicotine 90%. I went on to discuss living with this, versus surgery versus pain management. Surgery would be an anterior cervical discectomy and fusion between C5 and C6.   Risks of infection, nerve injury, paralysis, deep vein thrombosis, pulmonary embolism, death, dysphagia, dysphonia, pharyngeal injury, vascular injury, spinal fluid leakage and its repair, failure of fusion, ongoing neck pain, failure to alleviate her arm pins, needles, tingling sensation. This is not all-inclusive list of risk factors. If she would like to move forward surgery she again needs to be off all nicotine products and be willing to continue such following surgery    No orders of the defined types were placed in this encounter. No orders of the defined types were placed in this encounter. No follow-ups on file.       Fritz Almeida MD

## 2022-11-04 ENCOUNTER — OFFICE VISIT (OUTPATIENT)
Dept: CARDIOLOGY CLINIC | Age: 67
End: 2022-11-04
Payer: MEDICARE

## 2022-11-04 VITALS
HEART RATE: 84 BPM | BODY MASS INDEX: 35.14 KG/M2 | SYSTOLIC BLOOD PRESSURE: 126 MMHG | DIASTOLIC BLOOD PRESSURE: 88 MMHG | WEIGHT: 198.4 LBS | OXYGEN SATURATION: 97 %

## 2022-11-04 DIAGNOSIS — Z00.00 PE (PHYSICAL EXAM), ANNUAL: Primary | ICD-10-CM

## 2022-11-04 PROCEDURE — 93000 ELECTROCARDIOGRAM COMPLETE: CPT | Performed by: INTERNAL MEDICINE

## 2022-11-04 PROCEDURE — 3074F SYST BP LT 130 MM HG: CPT | Performed by: INTERNAL MEDICINE

## 2022-11-04 PROCEDURE — 3078F DIAST BP <80 MM HG: CPT | Performed by: INTERNAL MEDICINE

## 2022-11-04 PROCEDURE — 99214 OFFICE O/P EST MOD 30 MIN: CPT | Performed by: INTERNAL MEDICINE

## 2022-11-04 PROCEDURE — 1123F ACP DISCUSS/DSCN MKR DOCD: CPT | Performed by: INTERNAL MEDICINE

## 2022-11-04 ASSESSMENT — ENCOUNTER SYMPTOMS
NAUSEA: 0
EYES NEGATIVE: 1
SHORTNESS OF BREATH: 0
RESPIRATORY NEGATIVE: 1
BLOOD IN STOOL: 0
GASTROINTESTINAL NEGATIVE: 1
CHEST TIGHTNESS: 0
WHEEZING: 0
COUGH: 0
STRIDOR: 0

## 2022-11-04 NOTE — PROGRESS NOTES
Subsequent Progress Note  Patient: Luis Flores  YOB: 1955  MRN: [id number]    Chief Complaint:  Chief Complaint   Patient presents with    6 Month Follow-Up       CV Data:  4/22 CUS mild   4/22 spect small apical infarct. 4/22 Echo EF 65    Subjective/HPI: Ahmed pt with POTS. No symtpoms since > 4 yrs. Doing well no cp + sob with exertional activity walks with rico after back surgery    4/19/22 still same HORN no cp no falls no bleed. 11/4/22 doing well no further palps no cp no sob active eats well. Lives w   Smoker  No ETOH  Retired - .       EKG: SR 68 IRBBB    Past Medical History:   Diagnosis Date    Arthritis     Cerebral artery occlusion with cerebral infarction [facility]) 2016 June    TIA / sx left sided weakness & fell & unable to get up off floor for several hours    COPD (chronic obstructive pulmonary disease) ([address]     smoking habit > 40 yrs    Depression     Headache     Hyperlipidemia     meds > 10 yrs    Hypertension     meds since TIA / 6/2016    Neuropathy     POTS (postural orthostatic tachycardia syndrome)     2000's    Restless legs syndrome     Type 2 diabetes mellitus without complication (HCC)     hx > 7 yrs       Past Surgical History:   Procedure Laterality Date    ABDOMINAL EXPLORATION SURGERY  1970s    due to abdominal pain / no definite dx    BACK SURGERY  12/24/2018    lumbar disc OR at [address]    due to tendonitis    ENDOMETRIAL ABLATION  1990s    AUB    INSERT/ REPLACE INFUSN PUMP,PROGRAMMABLE N/A 05/29/2019    REMOVAL OF INTRATHECAL PUMP RESERVOIR performed by Anna Bolanos MD at [address]      pain pump placement 2010 & replacement 2016    SKIN BIOPSY  03/2022    on a cyst on neck(benign)    TONSILLECTOMY      age 24       Family History   Problem Relation Age of Onset    Stroke Mother     High Blood Pressure Mother     High Cholesterol Mother     Diabetes Father         Pre Heart Attack Father     Cancer Maternal Grandmother         colon    Cancer Maternal Grandfather     Alzheimer's Disease Paternal Grandmother     Alzheimer's Disease Paternal Grandfather     Diabetes Paternal Grandfather     COPD Sister        Social History     Socioeconomic History    Marital status:    Tobacco Use    Smoking status: Every Day     Packs/day: 1.00     Years: 44.00     Pack years: 44.00     Types: Cigarettes     Start date: 1975    Smokeless tobacco: Never   Substance and Sexual Activity    Alcohol use: No    Drug use: No     Social Determinants of Health     Physical Activity: Inactive    Days of Exercise per Week: 0 days    Minutes of Exercise per Session: 0 min       Allergies   Allergen Reactions    Aspirin Swelling    Penicillins Rash       Current Outpatient Medications   Medication Sig Dispense Refill    omeprazole (PRILOSEC) 40 MG delayed release capsule TAKE 1 CAPSULE BY MOUTH  DAILY 90 capsule 3    meloxicam (MOBIC) 15 MG tablet TAKE 1 TABLET BY MOUTH  DAILY 90 tablet 3    LANTUS SOLOSTAR 100 UNIT/ML injection pen INJECT SUBCUTANEOUSLY 30  UNITS TWICE DAILY 60 mL 3    simvastatin (ZOCOR) 40 MG tablet Take 1 tablet by mouth nightly 90 tablet 3    venlafaxine (EFFEXOR XR) 150 MG extended release capsule TAKE 1 CAPSULE BY MOUTH  DAILY 90 capsule 3    metFORMIN (GLUCOPHAGE-XR) 500 MG extended release tablet TAKE 1 TABLET BY MOUTH  TWICE DAILY 180 tablet 1    clopidogrel (PLAVIX) 75 MG tablet TAKE 1 TABLET BY MOUTH AT  NIGHT 90 tablet 3    Insulin Pen Needle (MEIJER PEN NEEDLES) 31G X 6 MM MISC 1 each by Does not apply route daily 100 each 3    lisinopril (PRINIVIL;ZESTRIL) 10 MG tablet TAKE 1 TABLET BY MOUTH AT  NIGHT 90 tablet 3    blood glucose monitor strips Use  strip 11    Lancets MISC 1 each by Does not apply route 3 times daily 100 each 11    Lancet Device MISC 1 Device by Does not apply route 4 times daily (before meals and nightly) Test 4x/day.  1 each 0    insulin lispro (HUMALOG KWIKPEN) 200 UNIT/ML SOPN pen INJECT SUBCUTANEOUSLY 3  UNITS FOR EVERY 15 GRAMS OF CARBOHYDRATES; 65-70 UNITS  DAILY 36 mL 3    blood glucose monitor kit and supplies Use TID 1 kit 0    tiZANidine (ZANAFLEX) 4 MG tablet Take 4 mg by mouth nightly  0    acetaminophen-codeine (TYLENOL #4) 300-60 MG per tablet Take 1 tablet by mouth 3 times daily as needed. .       No current facility-administered medications for this visit. Review of Systems:   Review of Systems   Constitutional: Negative. Negative for diaphoresis and fatigue. HENT: Negative. Eyes: Negative. Respiratory: Negative. Negative for cough, chest tightness, shortness of breath, wheezing and stridor. Cardiovascular: Negative. Negative for chest pain, palpitations and leg swelling. Gastrointestinal: Negative. Negative for blood in stool and nausea. Genitourinary: Negative. Musculoskeletal: Negative. Skin: Negative. Neurological: Negative. Negative for dizziness, syncope, weakness and light-headedness. Hematological: Negative. Psychiatric/Behavioral: Negative. Physical Examination:    /88 (Site: Left Upper Arm, Position: Sitting, Cuff Size: Large Adult)   Pulse 84   Wt 198 lb 6.4 oz (90 kg)   LMP 05/24/1999   SpO2 97%   BMI 35.14 kg/m²    Physical Exam   Constitutional: She appears healthy. No distress. HENT:   Normal cephalic and Atraumatic   Eyes: Pupils are equal, round, and reactive to light. Neck: Thyroid normal. No JVD present. No neck adenopathy. No thyromegaly present. Cardiovascular: Normal rate, regular rhythm, normal heart sounds, intact distal pulses and normal pulses. Pulmonary/Chest: Effort normal and breath sounds normal. She has no wheezes. She has no rales. She exhibits no tenderness. Abdominal: Soft. Bowel sounds are normal. There is no abdominal tenderness. Musculoskeletal:         General: No tenderness or edema. Normal range of motion.       Cervical back: Normal range of motion and neck supple. Neurological: She is alert and oriented to person, place, and time. Skin: Skin is warm. No cyanosis. Nails show no clubbing.      LABS:  CBC:   Lab Results   Component Value Date/Time    WBC 13.8 05/24/2019 10:53 AM    RBC 5.08 05/24/2019 10:53 AM    HGB 15.2 05/24/2019 10:53 AM    HCT 44.9 05/24/2019 10:53 AM    MCV 88.4 05/24/2019 10:53 AM    MCH 29.9 05/24/2019 10:53 AM    MCHC 33.8 05/24/2019 10:53 AM    RDW 17.3 05/24/2019 10:53 AM     05/24/2019 10:53 AM    MPV 9.8 12/03/2018 12:00 AM     Lipids:  Lab Results   Component Value Date    CHOL 155 04/05/2021    CHOL 150 11/06/2018    CHOL 147 01/26/2017     Lab Results   Component Value Date    TRIG 139 04/05/2021    TRIG 151 11/06/2018    TRIG 119 01/26/2017     Lab Results   Component Value Date    HDL 58 07/15/2022    HDL 40 04/05/2021    HDL 40 11/06/2018     Lab Results   Component Value Date    LDLCALC 99 07/15/2022    LDLCALC 87 04/05/2021    LDLCALC 80 11/06/2018     Lab Results   Component Value Date    VLDL 24 01/26/2017     Lab Results   Component Value Date    CHOLHDLRATIO 4.20 01/26/2017     CMP:    Lab Results   Component Value Date/Time     07/15/2022 06:28 PM    K 5.5 07/15/2022 06:28 PM    CL 99 07/15/2022 06:28 PM    CO2 32 07/15/2022 06:28 PM    BUN 12 07/15/2022 06:28 PM    CREATININE 0.87 07/15/2022 06:28 PM    GFRAA >60.0 07/15/2022 06:28 PM    LABGLOM >60.0 07/15/2022 06:28 PM    GLUCOSE 128 04/05/2021 08:44 PM    PROT 7.3 07/15/2022 06:28 PM    LABALBU 4.1 07/15/2022 06:28 PM    CALCIUM 9.5 07/15/2022 06:28 PM    BILITOT 0.4 07/15/2022 06:28 PM    ALKPHOS 103 07/15/2022 06:28 PM    AST 20 07/15/2022 06:28 PM    ALT <5 07/15/2022 06:28 PM     BMP:    Lab Results   Component Value Date/Time     07/15/2022 06:28 PM    K 5.5 07/15/2022 06:28 PM    CL 99 07/15/2022 06:28 PM    CO2 32 07/15/2022 06:28 PM    BUN 12 07/15/2022 06:28 PM    LABALBU 4.1 07/15/2022 06:28 PM    CREATININE 0.87 07/15/2022 06:28 PM    CALCIUM 9.5 07/15/2022 06:28 PM    GFRAA >60.0 07/15/2022 06:28 PM    LABGLOM >60.0 07/15/2022 06:28 PM    GLUCOSE 128 04/05/2021 08:44 PM     Magnesium:  No results found for: MG  TSH:No results found for: TSHFT4, TSH    Patient Active Problem List   Diagnosis    Chronic low back pain    Combined forms of age-related cataract of both eyes    COPD (chronic obstructive pulmonary disease) ([facility]    Dermatochalasis of both upper eyelids    Diabetes mellitus type 2 without retinopathy (HCC)    HTN (hypertension)    Neuropathy, lower extremity    Depression    Drug-induced constipation    POTS (postural orthostatic tachycardia syndrome)    Smoker    Post laminectomy syndrome    TIA (transient ischemic attack)    Morbidly obese (HCC)    Calcification of abdominal aorta (HCC)    Type 2 diabetes mellitus without complication ([facility]    Mass of left side of neck       There are no discontinued medications. Modified Medications    No medications on file       No orders of the defined types were placed in this encounter. Assessment/Plan:    1. POTS (postural orthostatic tachycardia syndrome)   stable no palps nor dizziness. 2. Essential hypertension   stable continue meds. Low salt diet     3. TIA (transient ischemic attack)     - US CAROTID ARTERY BILATERAL - mild     4. HORN (dyspnea on exertion)  spect small apical scar. - no CP. No SOB  Stop smoking    5. Severe obesity (BMI 35.0-39. 9) with comorbidity (HCC)   loose weight     6. Bilateral carotid bruits     - US CAROTID ARTERY BILATERAL- mild     7. Preop Back surgery- pt is cleared. She was told no surgery unless she stops smoking. Counseling:  Heart Healthy Lifestyle, Improve BMI, Stop Smoking, Low Salt Diet, Take Precautions to Prevent Falls and Walk Daily    Return in about 6 months (around 5/4/2023).       Electronically signed by Veronica Toledo MD on 11/4/2022 at 12:05 PM

## 2023-01-03 DIAGNOSIS — E11.9 DIABETES MELLITUS TYPE 2 WITHOUT RETINOPATHY (HCC): ICD-10-CM

## 2023-01-03 RX ORDER — GLUCOSAMINE HCL/CHONDROITIN SU 500-400 MG
CAPSULE ORAL
Qty: 100 STRIP | Refills: 11 | Status: SHIPPED | OUTPATIENT
Start: 2023-01-03

## 2023-01-03 NOTE — TELEPHONE ENCOUNTER
Comments: Patient uses [employer]      Last Office Visit (last PCP visit):   7/7/2022    Next Visit Date:  Future Appointments   Date Time Provider Tran Horne   1/9/2023  8:00 AM David Phillips MD [address] [place] [address]   5/5/2023 11:30 AM Johnny Hernandez  [facility]       **If hasn't been seen in over a year OR hasn't followed up according to last diabetes/ADHD visit, make appointment for patient before sending refill to provider.     Rx requested:  Requested Prescriptions     Pending Prescriptions Disp Refills    blood glucose monitor strips 100 strip 11     Sig: Use TID 18

## 2023-01-09 ENCOUNTER — OFFICE VISIT (OUTPATIENT)
Dept: FAMILY MEDICINE CLINIC | Age: 68
End: 2023-01-09

## 2023-01-09 VITALS
HEART RATE: 91 BPM | BODY MASS INDEX: 33.97 KG/M2 | HEIGHT: 64 IN | WEIGHT: 199 LBS | TEMPERATURE: 97 F | OXYGEN SATURATION: 96 % | SYSTOLIC BLOOD PRESSURE: 128 MMHG | DIASTOLIC BLOOD PRESSURE: 86 MMHG

## 2023-01-09 DIAGNOSIS — E11.9 DIABETES MELLITUS TYPE 2 WITHOUT RETINOPATHY (HCC): Primary | ICD-10-CM

## 2023-01-09 DIAGNOSIS — Z23 ENCOUNTER FOR IMMUNIZATION: ICD-10-CM

## 2023-01-09 DIAGNOSIS — J43.9 PULMONARY EMPHYSEMA, UNSPECIFIED EMPHYSEMA TYPE (HCC): ICD-10-CM

## 2023-01-09 DIAGNOSIS — I70.0 CALCIFICATION OF ABDOMINAL AORTA (HCC): ICD-10-CM

## 2023-01-09 DIAGNOSIS — Z12.31 ENCOUNTER FOR SCREENING MAMMOGRAM FOR MALIGNANT NEOPLASM OF BREAST: ICD-10-CM

## 2023-01-09 PROBLEM — K59.03 DRUG-INDUCED CONSTIPATION: Status: RESOLVED | Noted: 2018-04-17 | Resolved: 2023-01-09

## 2023-01-09 LAB — HBA1C MFR BLD: 5.9 %

## 2023-01-09 RX ORDER — CELECOXIB 200 MG/1
CAPSULE ORAL
COMMUNITY
Start: 2022-12-09

## 2023-01-09 RX ORDER — ORPHENADRINE CITRATE 100 MG/1
TABLET, EXTENDED RELEASE ORAL
COMMUNITY
Start: 2022-12-09

## 2023-01-09 SDOH — ECONOMIC STABILITY: FOOD INSECURITY: WITHIN THE PAST 12 MONTHS, THE FOOD YOU BOUGHT JUST DIDN'T LAST AND YOU DIDN'T HAVE MONEY TO GET MORE.: NEVER TRUE

## 2023-01-09 SDOH — ECONOMIC STABILITY: FOOD INSECURITY: WITHIN THE PAST 12 MONTHS, YOU WORRIED THAT YOUR FOOD WOULD RUN OUT BEFORE YOU GOT MONEY TO BUY MORE.: NEVER TRUE

## 2023-01-09 ASSESSMENT — PATIENT HEALTH QUESTIONNAIRE - PHQ9
4. FEELING TIRED OR HAVING LITTLE ENERGY: 2
9. THOUGHTS THAT YOU WOULD BE BETTER OFF DEAD, OR OF HURTING YOURSELF: 0
SUM OF ALL RESPONSES TO PHQ QUESTIONS 1-9: 4
3. TROUBLE FALLING OR STAYING ASLEEP: 2
SUM OF ALL RESPONSES TO PHQ9 QUESTIONS 1 & 2: 0
SUM OF ALL RESPONSES TO PHQ QUESTIONS 1-9: 4
10. IF YOU CHECKED OFF ANY PROBLEMS, HOW DIFFICULT HAVE THESE PROBLEMS MADE IT FOR YOU TO DO YOUR WORK, TAKE CARE OF THINGS AT HOME, OR GET ALONG WITH OTHER PEOPLE: 0
8. MOVING OR SPEAKING SO SLOWLY THAT OTHER PEOPLE COULD HAVE NOTICED. OR THE OPPOSITE, BEING SO FIGETY OR RESTLESS THAT YOU HAVE BEEN MOVING AROUND A LOT MORE THAN USUAL: 0
1. LITTLE INTEREST OR PLEASURE IN DOING THINGS: 0
2. FEELING DOWN, DEPRESSED OR HOPELESS: 0
SUM OF ALL RESPONSES TO PHQ QUESTIONS 1-9: 4
SUM OF ALL RESPONSES TO PHQ QUESTIONS 1-9: 4
5. POOR APPETITE OR OVEREATING: 0
6. FEELING BAD ABOUT YOURSELF - OR THAT YOU ARE A FAILURE OR HAVE LET YOURSELF OR YOUR FAMILY DOWN: 0
7. TROUBLE CONCENTRATING ON THINGS, SUCH AS READING THE NEWSPAPER OR WATCHING TELEVISION: 0

## 2023-01-09 ASSESSMENT — ENCOUNTER SYMPTOMS
WHEEZING: 0
SHORTNESS OF BREATH: 0
ABDOMINAL PAIN: 0
COUGH: 0
DIARRHEA: 0
RHINORRHEA: 0
CONSTIPATION: 0
SORE THROAT: 0

## 2023-01-09 ASSESSMENT — SOCIAL DETERMINANTS OF HEALTH (SDOH): HOW HARD IS IT FOR YOU TO PAY FOR THE VERY BASICS LIKE FOOD, HOUSING, MEDICAL CARE, AND HEATING?: NOT HARD AT ALL

## 2023-01-09 NOTE — PROGRESS NOTES
Vaccine Information Sheet, \"Influenza - Inactivated\" OR \"Live - Intranasal\"  given to Malachi Brasher, or parent/legal guardian of  Malachi Brasher and verbalized understanding. Patient responses:    Have you ever had a reaction to a flu vaccine? No  Are you able to eat eggs without adverse effects? Yes  Do you have any current illness? No  Have you ever had Guillian Eastover Syndrome? No    Flu vaccine given per order. Please see immunization tab.

## 2023-01-09 NOTE — PROGRESS NOTES
[address] PRIMARY CARE  [address] [address]  Dept: [phone]  Dept Fax: [phone]  Loc: [phone]     Chief Complaint  Chief Complaint   Patient presents with    Diabetes       HPI:  74 y. o.female who presents for the following:      DM2: a1c 5.9 from 5.8; compliant with meds; compliant with diet; No excessive thirst, urination, or blurry vision. Current diabetes regimen:   - Metformin  - Lantus 24 BID  - Lispro 6u/15g carb(usual 12-20u TID AC; hasn't been taking due to risk of low sugar)       Review of Systems   Constitutional:  Negative for chills and fever. HENT:  Negative for congestion, rhinorrhea and sore throat. Respiratory:  Negative for cough, shortness of breath and wheezing. Gastrointestinal:  Negative for abdominal pain, constipation and diarrhea. Endocrine: Negative for polydipsia and polyuria. Genitourinary:  Negative for dysuria, frequency and urgency. Neurological:  Negative for syncope, light-headedness, numbness and headaches. Psychiatric/Behavioral:  Negative for sleep disturbance. The patient is not nervous/anxious.       Past Medical History:   Diagnosis Date    Arthritis     Cerebral artery occlusion with cerebral infarction [facility]) 2016 June    TIA / sx left sided weakness & fell & unable to get up off floor for several hours    COPD (chronic obstructive pulmonary disease) ([facility]     smoking habit > 40 yrs    Depression     Headache     Hyperlipidemia     meds > 10 yrs    Hypertension     meds since TIA / 6/2016    Neuropathy     POTS (postural orthostatic tachycardia syndrome)     2000's    Restless legs syndrome     Type 2 diabetes mellitus without complication (HCC)     hx > 7 yrs     Past Surgical History:   Procedure Laterality Date    ABDOMINAL EXPLORATION SURGERY  1970s    due to abdominal pain / no definite dx    BACK SURGERY  12/24/2018    lumbar disc OR at [facility] COLONOSCOPY      ELBOW SURGERY Right 1989    due to tendonitis    ENDOMETRIAL ABLATION  1990s    AUB    INSERT/ REPLACE INFUSN PUMP,PROGRAMMABLE N/A 05/29/2019    REMOVAL OF INTRATHECAL PUMP RESERVOIR performed by Lashonda Porter MD at [address]      pain pump placement 2010 & replacement 2016    SKIN BIOPSY  03/2022    on a cyst on neck(benign)    TONSILLECTOMY      age 24     Social History     Socioeconomic History    Marital status:      Spouse name: Not on file    Number of children: Not on file    Years of education: Not on file    Highest education level: Not on file   Occupational History    Not on file   Tobacco Use    Smoking status: Every Day     Packs/day: 1.00     Years: 44.00     Pack years: 44.00     Types: Cigarettes     Start date: 67    Smokeless tobacco: Never   Substance and Sexual Activity    Alcohol use: No    Drug use: No    Sexual activity: Not on file   Other Topics Concern    Not on file   Social History Narrative    Not on file     Social Determinants of Health     Financial Resource Strain: Low Risk     Difficulty of Paying Living Expenses: Not hard at all   Food Insecurity: No Food Insecurity    Worried About Running Out of Food in the Last Year: Never true    Ran Out of Food in the Last Year: Never true   Transportation Needs: Not on file   Physical Activity: Inactive    Days of Exercise per Week: 0 days    Minutes of Exercise per Session: 0 min   Stress: Not on file   Social Connections: Not on file   Intimate Partner Violence: Not on file   Housing Stability: Not on file     Family History   Problem Relation Age of Onset    Stroke Mother     High Blood Pressure Mother     High Cholesterol Mother     Diabetes Father         Pre    Heart Attack Father     Cancer Maternal Grandmother         colon    Cancer Maternal Grandfather     Alzheimer's Disease Paternal Grandmother     Alzheimer's Disease Paternal Grandfather     Diabetes Paternal Grandfather COPD Sister       Allergies   Allergen Reactions    Aspirin Swelling    Penicillins Rash     Current Outpatient Medications   Medication Sig Dispense Refill    celecoxib (CELEBREX) 200 MG capsule TAKE 1 CAPSULE BY MOUTH TWICE A DAY AFTER MEALS      orphenadrine (NORFLEX) 100 MG extended release tablet TAKE 1 TABLET BY MOUTH TWICE A DAY      diphenhydrAMINE HCl (BENADRYL PO) Take by mouth      Naproxen Sodium (ALEVE PO) Take by mouth      blood glucose monitor strips Use  strip 11    omeprazole (PRILOSEC) 40 MG delayed release capsule TAKE 1 CAPSULE BY MOUTH  DAILY 90 capsule 3    LANTUS SOLOSTAR 100 UNIT/ML injection pen INJECT SUBCUTANEOUSLY 30  UNITS TWICE DAILY 60 mL 3    simvastatin (ZOCOR) 40 MG tablet Take 1 tablet by mouth nightly 90 tablet 3    venlafaxine (EFFEXOR XR) 150 MG extended release capsule TAKE 1 CAPSULE BY MOUTH  DAILY 90 capsule 3    metFORMIN (GLUCOPHAGE-XR) 500 MG extended release tablet TAKE 1 TABLET BY MOUTH  TWICE DAILY 180 tablet 1    clopidogrel (PLAVIX) 75 MG tablet TAKE 1 TABLET BY MOUTH AT  NIGHT 90 tablet 3    Insulin Pen Needle (MEIJER PEN NEEDLES) 31G X 6 MM MISC 1 each by Does not apply route daily 100 each 3    lisinopril (PRINIVIL;ZESTRIL) 10 MG tablet TAKE 1 TABLET BY MOUTH AT  NIGHT 90 tablet 3    Lancets MISC 1 each by Does not apply route 3 times daily 100 each 11    Lancet Device MISC 1 Device by Does not apply route 4 times daily (before meals and nightly) Test 4x/day. 1 each 0    insulin lispro (HUMALOG KWIKPEN) 200 UNIT/ML SOPN pen INJECT SUBCUTANEOUSLY 3  UNITS FOR EVERY 15 GRAMS OF CARBOHYDRATES; 65-70 UNITS  DAILY 36 mL 3    blood glucose monitor kit and supplies Use TID 1 kit 0    acetaminophen-codeine (TYLENOL #4) 300-60 MG per tablet Take 1 tablet by mouth 3 times daily as needed. .      meloxicam (MOBIC) 15 MG tablet TAKE 1 TABLET BY MOUTH  DAILY 90 tablet 3    tiZANidine (ZANAFLEX) 4 MG tablet Take 4 mg by mouth nightly  0     No current facility-administered medications for this visit. Vitals:    01/09/23 0800   BP: 128/86   Pulse: 91   Temp: 97 °F (36.1 °C)   TempSrc: Infrared   SpO2: 96%   Weight: 199 lb (90.3 kg)   Height: 5' 3.5\" (1.613 m)       Physical exam:  Physical Exam  Vitals reviewed. Constitutional:       General: She is not in acute distress. Appearance: She is well-developed. HENT:      Head: Normocephalic and atraumatic. Cardiovascular:      Rate and Rhythm: Normal rate. Pulmonary:      Effort: Pulmonary effort is normal. No respiratory distress. Musculoskeletal:      Cervical back: Normal range of motion. Skin:     General: Skin is warm and dry. Neurological:      Mental Status: She is alert and oriented to person, place, and time. Psychiatric:         Attention and Perception: Attention normal.         Behavior: Behavior normal.       Assessment/Plan:  74 y.o. female here mainly for DM2:  - DM2: controlled; will continue on current regimen; no low sugars but discussed lowering the lantus if starts getting low sugars     Diagnosis Orders   1. Diabetes mellitus type 2 without retinopathy ([address]  POCT glycosylated hemoglobin (Hb A1C)    Microalbumin / Creatinine Urine Ratio      2. Encounter for immunization  Influenza, FLUAD, (age 72 y+), IM, Preservative Free, 0.5 mL      3. Pulmonary emphysema, unspecified emphysema type ([address]        4. Calcification of abdominal aorta ([address]        5. Encounter for screening mammogram for malignant neoplasm of breast  [facility] DIGITAL SCREEN BILATERAL           Return in about 6 months (around 7/9/2023) for AWV and DM2.     Margo Coello MD

## 2023-01-12 ENCOUNTER — HOSPITAL ENCOUNTER (OUTPATIENT)
Age: 68
Setting detail: SPECIMEN
Discharge: HOME | End: 2023-01-12
Payer: MEDICARE

## 2023-01-12 DIAGNOSIS — E11.9 DIABETES MELLITUS TYPE 2 WITHOUT RETINOPATHY (HCC): ICD-10-CM

## 2023-01-12 LAB
CREATININE URINE: 13.4 MG/DL
MICROALBUMIN UR-MCNC: <1.2 MG/DL
MICROALBUMIN/CREAT UR-RTO: NORMAL MG/G (ref 0–30)

## 2023-01-12 PROCEDURE — 82570 ASSAY OF URINE CREATININE: CPT

## 2023-01-12 PROCEDURE — 82043 UR ALBUMIN QUANTITATIVE: CPT

## 2023-01-23 ENCOUNTER — HOSPITAL ENCOUNTER (OUTPATIENT)
Dept: WOMENS IMAGING | Age: 68
Discharge: HOME | End: 2023-01-25
Payer: MEDICARE

## 2023-01-23 DIAGNOSIS — Z12.31 ENCOUNTER FOR SCREENING MAMMOGRAM FOR MALIGNANT NEOPLASM OF BREAST: ICD-10-CM

## 2023-01-23 PROCEDURE — 77063 BREAST TOMOSYNTHESIS BI: CPT

## 2023-01-26 DIAGNOSIS — E11.9 DIABETES MELLITUS TYPE 2 WITHOUT RETINOPATHY (HCC): ICD-10-CM

## 2023-01-26 RX ORDER — METFORMIN HYDROCHLORIDE 500 MG/1
TABLET, EXTENDED RELEASE ORAL
Qty: 180 TABLET | Refills: 1 | Status: SHIPPED | OUTPATIENT
Start: 2023-01-26

## 2023-01-26 NOTE — TELEPHONE ENCOUNTER
Comments:     Last Office Visit (last PCP visit):   1/9/2023    Next Visit Date:  Future Appointments   Date Time Provider Tran Pisanoi   5/5/2023 11:30 AM Harlan Zafar MD [address] [place] [address]   7/10/2023  8:00 AM Sujey Nash MD [facility]       **If hasn't been seen in over a year OR hasn't followed up according to last diabetes/ADHD visit, make appointment for patient before sending refill to provider.     Rx requested:  Requested Prescriptions     Pending Prescriptions Disp Refills    metFORMIN (GLUCOPHAGE-XR) 500 MG extended release tablet [Pharmacy Med Name: metFORMIN HCl  MG Oral Tablet Extended Release 24 Hour] 180 tablet 3     Sig: TAKE 1 TABLET BY MOUTH  TWICE DAILY

## 2023-02-09 DIAGNOSIS — G45.9 TIA (TRANSIENT ISCHEMIC ATTACK): ICD-10-CM

## 2023-02-09 RX ORDER — CLOPIDOGREL BISULFATE 75 MG/1
TABLET ORAL
Qty: 90 TABLET | Refills: 3 | Status: SHIPPED | OUTPATIENT
Start: 2023-02-09

## 2023-02-09 NOTE — TELEPHONE ENCOUNTER
Comments:     Last Office Visit (last PCP visit):   1/9/2023    Next Visit Date:  Future Appointments   Date Time Provider Tran Horne   5/5/2023 11:30 AM Sera Cheatham MD [facility]   7/10/2023  8:00 AM Jerry Sesay MD [facility]       **If hasn't been seen in over a year OR hasn't followed up according to last diabetes/ADHD visit, make appointment for patient before sending refill to provider.     Rx requested:  Requested Prescriptions     Pending Prescriptions Disp Refills    clopidogrel (PLAVIX) 75 MG tablet [Pharmacy Med Name: Clopidogrel Bisulfate 75 MG Oral Tablet] 90 tablet 3     Sig: TAKE 1 TABLET BY MOUTH AT  NIGHT

## 2023-02-28 DIAGNOSIS — E11.9 DIABETES MELLITUS TYPE 2 WITHOUT RETINOPATHY (HCC): ICD-10-CM

## 2023-02-28 NOTE — TELEPHONE ENCOUNTER
Comments:     Last Office Visit (last PCP visit):   1/9/2023    Next Visit Date:  Future Appointments   Date Time Provider Tran Horne   5/5/2023 11:30 AM Rogelio Bowens MD [address] [place] [address]   7/10/2023  8:00 AM Norman Moss MD [facility]       **If hasn't been seen in over a year OR hasn't followed up according to last diabetes/ADHD visit, make appointment for patient before sending refill to provider.     Rx requested:  Requested Prescriptions     Pending Prescriptions Disp Refills    [employer] MINI PEN NEEDLES 31G X 6 MM MISC [Pharmacy Med Name: [id number]] 80 each      Sig: USE 2 DAILY AS DIRECTED

## 2023-03-01 RX ORDER — PEN NEEDLE, DIABETIC 29 G X1/2"
NEEDLE, DISPOSABLE MISCELLANEOUS
Qty: 80 EACH | Refills: 1 | Status: SHIPPED | OUTPATIENT
Start: 2023-03-01

## 2023-03-09 DIAGNOSIS — G45.9 TIA (TRANSIENT ISCHEMIC ATTACK): ICD-10-CM

## 2023-03-09 RX ORDER — CLOPIDOGREL BISULFATE 75 MG/1
TABLET ORAL
Qty: 90 TABLET | Refills: 3 | Status: SHIPPED | OUTPATIENT
Start: 2023-03-09

## 2023-03-09 NOTE — TELEPHONE ENCOUNTER
Comments:     Last Office Visit (last PCP visit):   1/9/2023    Next Visit Date:  Future Appointments   Date Time Provider Tran Coleen   5/5/2023 11:30 AM Warner Diaz MD [address] [place] [address]   7/10/2023  8:00 AM Mirna Tim MD [facility]       **If hasn't been seen in over a year OR hasn't followed up according to last diabetes/ADHD visit, make appointment for patient before sending refill to provider.     Rx requested:  Requested Prescriptions     Pending Prescriptions Disp Refills    clopidogrel (PLAVIX) 75 MG tablet 90 tablet 3

## 2023-03-20 DIAGNOSIS — F32.89 OTHER DEPRESSION: ICD-10-CM

## 2023-03-20 RX ORDER — LISINOPRIL 10 MG/1
TABLET ORAL
Qty: 90 TABLET | Refills: 3 | Status: SHIPPED | OUTPATIENT
Start: 2023-03-20

## 2023-03-20 RX ORDER — VENLAFAXINE HYDROCHLORIDE 150 MG/1
150 CAPSULE, EXTENDED RELEASE ORAL DAILY
Qty: 90 CAPSULE | Refills: 3 | Status: SHIPPED | OUTPATIENT
Start: 2023-03-20

## 2023-03-20 NOTE — TELEPHONE ENCOUNTER
Comments:     Last Office Visit (last PCP visit):   1/9/2023    Next Visit Date:  Future Appointments   Date Time Provider Tran Horne   5/5/2023 11:30 AM Luma Wren MD [address] [place] [address]   7/10/2023  8:00 AM Ambreen Martin MD [facility]       **If hasn't been seen in over a year OR hasn't followed up according to last diabetes/ADHD visit, make appointment for patient before sending refill to provider.     Rx requested:  Requested Prescriptions     Pending Prescriptions Disp Refills    lisinopril (PRINIVIL;ZESTRIL) 10 MG tablet [Pharmacy Med Name: Lisinopril 10 MG Oral Tablet] 90 tablet 3     Sig: TAKE 1 TABLET BY MOUTH AT  NIGHT    venlafaxine (EFFEXOR XR) 150 MG extended release capsule [Pharmacy Med Name: Venlafaxine HCl  MG Oral Capsule Extended Release 24 Hour] 90 capsule 3     Sig: TAKE 1 CAPSULE BY MOUTH  DAILY

## 2023-04-19 DIAGNOSIS — G89.29 CHRONIC MIDLINE LOW BACK PAIN WITH LEFT-SIDED SCIATICA: ICD-10-CM

## 2023-04-19 DIAGNOSIS — M54.42 CHRONIC MIDLINE LOW BACK PAIN WITH LEFT-SIDED SCIATICA: ICD-10-CM

## 2023-04-19 RX ORDER — MELOXICAM 15 MG/1
15 TABLET ORAL DAILY
Qty: 90 TABLET | Refills: 3 | Status: SHIPPED | OUTPATIENT
Start: 2023-04-19

## 2023-04-19 NOTE — TELEPHONE ENCOUNTER
Comments:     Last Office Visit (last PCP visit):   1/9/2023    Next Visit Date:  Future Appointments   Date Time Provider Tran Pisanoi   5/5/2023 11:30 AM Maris Gagnon MD [address] [place] [address]   7/10/2023  8:00 AM Donita Diaz MD [facility]       **If hasn't been seen in over a year OR hasn't followed up according to last diabetes/ADHD visit, make appointment for patient before sending refill to provider.     Rx requested:  Requested Prescriptions     Pending Prescriptions Disp Refills    meloxicam (MOBIC) 15 MG tablet 90 tablet 3     Sig: Take 1 tablet by mouth daily

## 2023-05-04 DIAGNOSIS — E11.9 DIABETES MELLITUS TYPE 2 WITHOUT RETINOPATHY (HCC): ICD-10-CM

## 2023-05-05 ENCOUNTER — OFFICE VISIT (OUTPATIENT)
Dept: CARDIOLOGY CLINIC | Age: 68
End: 2023-05-05
Payer: MEDICARE

## 2023-05-05 VITALS
HEART RATE: 78 BPM | BODY MASS INDEX: 35.57 KG/M2 | SYSTOLIC BLOOD PRESSURE: 126 MMHG | DIASTOLIC BLOOD PRESSURE: 78 MMHG | OXYGEN SATURATION: 94 % | WEIGHT: 204 LBS

## 2023-05-05 DIAGNOSIS — I10 ESSENTIAL HYPERTENSION: ICD-10-CM

## 2023-05-05 DIAGNOSIS — G45.9 TIA (TRANSIENT ISCHEMIC ATTACK): ICD-10-CM

## 2023-05-05 DIAGNOSIS — R09.89 BILATERAL CAROTID BRUITS: ICD-10-CM

## 2023-05-05 DIAGNOSIS — G90.A POTS (POSTURAL ORTHOSTATIC TACHYCARDIA SYNDROME): Primary | ICD-10-CM

## 2023-05-05 DIAGNOSIS — E78.5 DYSLIPIDEMIA: ICD-10-CM

## 2023-05-05 PROCEDURE — 3078F DIAST BP <80 MM HG: CPT | Performed by: INTERNAL MEDICINE

## 2023-05-05 PROCEDURE — 99214 OFFICE O/P EST MOD 30 MIN: CPT | Performed by: INTERNAL MEDICINE

## 2023-05-05 PROCEDURE — 3074F SYST BP LT 130 MM HG: CPT | Performed by: INTERNAL MEDICINE

## 2023-05-05 PROCEDURE — 1123F ACP DISCUSS/DSCN MKR DOCD: CPT | Performed by: INTERNAL MEDICINE

## 2023-05-05 ASSESSMENT — ENCOUNTER SYMPTOMS
EYES NEGATIVE: 1
BLOOD IN STOOL: 0
COUGH: 0
CHEST TIGHTNESS: 0
NAUSEA: 0
WHEEZING: 0
STRIDOR: 0
GASTROINTESTINAL NEGATIVE: 1
SHORTNESS OF BREATH: 0
RESPIRATORY NEGATIVE: 1

## 2023-05-05 NOTE — TELEPHONE ENCOUNTER
Comments:     Last Office Visit (last PCP visit):   1/9/2023    Next Visit Date:  Future Appointments   Date Time Provider Tran Pisanoi   7/10/2023  8:00 AM Allie Lugo MD [address] [place] [address]   11/7/2023 12:15 PM Ilya Mcdonald MD [address] [place] [address]       **If hasn't been seen in over a year OR hasn't followed up according to last diabetes/ADHD visit, make appointment for patient before sending refill to provider.     Rx requested:  Requested Prescriptions     Pending Prescriptions Disp Refills    [employer] MINI PEN NEEDLES 31G X 6 MM MISC [Pharmacy Med Name: [id number]] 160 each      Sig: USE 2 PEN NEEDLES DAILY AS  DIRECTED

## 2023-05-05 NOTE — PROGRESS NOTES
Subsequent Progress Note  Patient: Sridhar Sampson  YOB: 1955  MRN: [id number]    Chief Complaint:  Chief Complaint   Patient presents with    6 Month Follow-Up     For POTS       CV Data:  4/22 CUS mild   4/22 spect small apical infarct. 4/22 Echo EF 65    Subjective/HPI: Ahmed pt with POTS. No symtpoms since > 4 yrs. Doing well no cp + sob with exertional activity walks with rico after back surgery    4/19/22 still same HORN no cp no falls no bleed. 11/4/22 doing well no further palps no cp no sob active eats well. 5/5/23 doing very well active no more dizzy no cp no sob no bleed does not sleep well but Sleep study negative x3. Lives w   Smoker  No ETOH  Retired - .       EKG: SR 68 IRBBB    Past Medical History:   Diagnosis Date    Arthritis     Cerebral artery occlusion with cerebral infarction [facility]) 2016 June    TIA / sx left sided weakness & fell & unable to get up off floor for several hours    COPD (chronic obstructive pulmonary disease) ([address]     smoking habit > 40 yrs    Depression     Headache     Hyperlipidemia     meds > 10 yrs    Hypertension     meds since TIA / 6/2016    Neuropathy     POTS (postural orthostatic tachycardia syndrome)     2000's    Restless legs syndrome     Type 2 diabetes mellitus without complication (HCC)     hx > 7 yrs       Past Surgical History:   Procedure Laterality Date    ABDOMINAL EXPLORATION SURGERY  1970s    due to abdominal pain / no definite dx    BACK SURGERY  12/24/2018    lumbar disc OR at [address]    due to tendonitis    ENDOMETRIAL ABLATION  1990s    AUB    OTHER SURGICAL HISTORY      pain pump placement 2010 & replacement 2016    [place] IMPLTJ/RPLCMT ITHCL/EDRL DRUG NFS PRGRBL PUMP N/A 05/29/2019    REMOVAL OF INTRATHECAL PUMP RESERVOIR performed by Kitty Keith MD at [address] [place] [address]  03/2022    on a cyst on neck(benign)    TONSILLECTOMY      age 24       Family History

## 2023-05-07 RX ORDER — PEN NEEDLE, DIABETIC 29 G X1/2"
NEEDLE, DISPOSABLE MISCELLANEOUS
Qty: 160 EACH | Refills: 0 | Status: SHIPPED | OUTPATIENT
Start: 2023-05-07

## 2023-06-06 ENCOUNTER — OFFICE VISIT (OUTPATIENT)
Dept: FAMILY MEDICINE CLINIC | Age: 68
End: 2023-06-06
Payer: MEDICARE

## 2023-06-06 ENCOUNTER — HOSPITAL ENCOUNTER (OUTPATIENT)
Age: 68
Setting detail: SPECIMEN
Discharge: HOME | End: 2023-06-06
Payer: MEDICARE

## 2023-06-06 VITALS
BODY MASS INDEX: 35.64 KG/M2 | SYSTOLIC BLOOD PRESSURE: 120 MMHG | DIASTOLIC BLOOD PRESSURE: 84 MMHG | WEIGHT: 204.4 LBS | HEART RATE: 84 BPM | OXYGEN SATURATION: 94 %

## 2023-06-06 DIAGNOSIS — K21.9 GASTROESOPHAGEAL REFLUX DISEASE, UNSPECIFIED WHETHER ESOPHAGITIS PRESENT: ICD-10-CM

## 2023-06-06 DIAGNOSIS — E11.9 DIABETES MELLITUS TYPE 2 WITHOUT RETINOPATHY (HCC): Primary | ICD-10-CM

## 2023-06-06 DIAGNOSIS — L72.9 SKIN CYST: ICD-10-CM

## 2023-06-06 DIAGNOSIS — E11.9 DIABETES MELLITUS TYPE 2 WITHOUT RETINOPATHY (HCC): ICD-10-CM

## 2023-06-06 DIAGNOSIS — F51.04 PSYCHOPHYSIOLOGICAL INSOMNIA: ICD-10-CM

## 2023-06-06 LAB — HBA1C MFR BLD: 6.1 % (ref 4.8–5.9)

## 2023-06-06 PROCEDURE — 3079F DIAST BP 80-89 MM HG: CPT | Performed by: FAMILY MEDICINE

## 2023-06-06 PROCEDURE — 99213 OFFICE O/P EST LOW 20 MIN: CPT | Performed by: FAMILY MEDICINE

## 2023-06-06 PROCEDURE — 3044F HG A1C LEVEL LT 7.0%: CPT | Performed by: FAMILY MEDICINE

## 2023-06-06 PROCEDURE — 3074F SYST BP LT 130 MM HG: CPT | Performed by: FAMILY MEDICINE

## 2023-06-06 PROCEDURE — 36415 COLL VENOUS BLD VENIPUNCTURE: CPT | Performed by: FAMILY MEDICINE

## 2023-06-06 PROCEDURE — 1123F ACP DISCUSS/DSCN MKR DOCD: CPT | Performed by: FAMILY MEDICINE

## 2023-06-06 PROCEDURE — 83036 HEMOGLOBIN GLYCOSYLATED A1C: CPT

## 2023-06-06 RX ORDER — LANCETS 30 GAUGE
EACH MISCELLANEOUS
Qty: 100 EACH | Refills: 11 | Status: SHIPPED | OUTPATIENT
Start: 2023-06-06

## 2023-06-06 RX ORDER — HYDROXYZINE 50 MG/1
50 TABLET, FILM COATED ORAL NIGHTLY PRN
Qty: 30 TABLET | Refills: 3 | Status: SHIPPED | OUTPATIENT
Start: 2023-06-06 | End: 2023-07-06

## 2023-06-06 RX ORDER — OMEPRAZOLE 40 MG/1
40 CAPSULE, DELAYED RELEASE ORAL DAILY
Qty: 90 CAPSULE | Refills: 3 | Status: SHIPPED | OUTPATIENT
Start: 2023-06-06

## 2023-06-06 SDOH — ECONOMIC STABILITY: FOOD INSECURITY: WITHIN THE PAST 12 MONTHS, YOU WORRIED THAT YOUR FOOD WOULD RUN OUT BEFORE YOU GOT MONEY TO BUY MORE.: NEVER TRUE

## 2023-06-06 SDOH — ECONOMIC STABILITY: HOUSING INSECURITY
IN THE LAST 12 MONTHS, WAS THERE A TIME WHEN YOU DID NOT HAVE A STEADY PLACE TO SLEEP OR SLEPT IN A SHELTER (INCLUDING NOW)?: NO

## 2023-06-06 SDOH — ECONOMIC STABILITY: INCOME INSECURITY: HOW HARD IS IT FOR YOU TO PAY FOR THE VERY BASICS LIKE FOOD, HOUSING, MEDICAL CARE, AND HEATING?: NOT HARD AT ALL

## 2023-06-06 SDOH — ECONOMIC STABILITY: FOOD INSECURITY: WITHIN THE PAST 12 MONTHS, THE FOOD YOU BOUGHT JUST DIDN'T LAST AND YOU DIDN'T HAVE MONEY TO GET MORE.: NEVER TRUE

## 2023-06-06 ASSESSMENT — ENCOUNTER SYMPTOMS
COUGH: 0
DIARRHEA: 0
SORE THROAT: 0
CONSTIPATION: 0
WHEEZING: 0
SHORTNESS OF BREATH: 0
ABDOMINAL PAIN: 0
RHINORRHEA: 0

## 2023-06-06 NOTE — PROGRESS NOTES
controled; A1c by blood draw today; will call with plan changes; She requests to go off some of there meds if the A1c is good  - Skin cyst: seems to be abnormal scarring plus irritation from the clothing; sending to derm  - Insomnia: prn hydroxyzine; discussed good sleep hygiene which she needs to work on; she is headed to [place] and [place] tomorrow     Diagnosis Orders   1. Diabetes mellitus type 2 without retinopathy ([facility]  Hemoglobin A1C    Lancets MISC      2. Gastroesophageal reflux disease, unspecified whether esophagitis present  omeprazole (PRILOSEC) 40 MG delayed release capsule      3. Skin cyst  Amb External Referral To Dermatology      4. Psychophysiological insomnia  hydrOXYzine HCl (ATARAX) 50 MG tablet           Return in about 6 months (around 12/6/2023) for DM2.     Marisel Herron MD

## 2023-06-20 DIAGNOSIS — E11.9 DIABETES MELLITUS TYPE 2 WITHOUT RETINOPATHY (HCC): ICD-10-CM

## 2023-06-20 RX ORDER — PEN NEEDLE, DIABETIC 29 G X1/2"
NEEDLE, DISPOSABLE MISCELLANEOUS
Qty: 160 EACH | Refills: 0 | Status: SHIPPED | OUTPATIENT
Start: 2023-06-20

## 2023-06-20 NOTE — TELEPHONE ENCOUNTER
Comments:     Last Office Visit (last PCP visit):   6/6/2023    Next Visit Date:  Future Appointments   Date Time Provider [place]   11/7/2023 12:15 PM Tres eLon MD [address] [place] [address]   12/5/2023  8:30 AM Aleta Roldan MD [facility]       **If hasn't been seen in over a year OR hasn't followed up according to last diabetes/ADHD visit, make appointment for patient before sending refill to provider.     Rx requested:  Requested Prescriptions     Pending Prescriptions Disp Refills    ULTICARE MINI PEN NEEDLES 31G X 6 MM MISC [Pharmacy Med Name: [id number]] 160 each 0     Sig: USE 2 PEN NEEDLES DAILY AS  DIRECTED

## 2023-06-28 DIAGNOSIS — F51.04 PSYCHOPHYSIOLOGICAL INSOMNIA: ICD-10-CM

## 2023-06-28 RX ORDER — HYDROXYZINE 50 MG/1
50 TABLET, FILM COATED ORAL NIGHTLY PRN
Qty: 90 TABLET | Refills: 1 | Status: SHIPPED | OUTPATIENT
Start: 2023-06-28 | End: 2023-07-28

## 2023-08-21 RX ORDER — SIMVASTATIN 40 MG
TABLET ORAL
Qty: 90 TABLET | Refills: 3 | Status: SHIPPED | OUTPATIENT
Start: 2023-08-21

## 2023-08-21 NOTE — TELEPHONE ENCOUNTER
Comments:     Last Office Visit (last PCP visit):   6/6/2023    Next Visit Date:  Future Appointments   Date Time Provider [facility]   11/7/2023 12:15 PM David Carroll MD [facility]   12/5/2023  8:30 AM Kamini Venegas MD [facility]       **If hasn't been seen in over a year OR hasn't followed up according to last diabetes/ADHD visit, make appointment for patient before sending refill to provider.     Rx requested:  Requested Prescriptions     Pending Prescriptions Disp Refills    simvastatin (ZOCOR) 40 MG tablet [Pharmacy Med Name: Simvastatin 40 MG Oral Tablet] 90 tablet 3     Sig: TAKE 1 TABLET BY MOUTH AT  NIGHT

## 2023-10-12 DIAGNOSIS — E11.9 DIABETES MELLITUS TYPE 2 WITHOUT RETINOPATHY (HCC): ICD-10-CM

## 2023-10-13 RX ORDER — FLURBIPROFEN SODIUM 0.3 MG/ML
SOLUTION/ DROPS OPHTHALMIC
Qty: 160 EACH | Refills: 0 | Status: SHIPPED | OUTPATIENT
Start: 2023-10-13

## 2023-10-13 NOTE — TELEPHONE ENCOUNTER
Comments:     Last Office Visit (last PCP visit):   6/6/2023    Next Visit Date:  Future Appointments   Date Time Provider [facility]   11/7/2023 12:15 PM Connie Smith MD [address] [place] [address]   12/5/2023  8:30 AM Remi Sheth MD [facility]       **If hasn't been seen in over a year OR hasn't followed up according to last diabetes/ADHD visit, make appointment for patient before sending refill to provider.     Rx requested:  Requested Prescriptions     Pending Prescriptions Disp Refills    ULTICARE MINI PEN NEEDLES 31G X 6 MM MISC [Pharmacy Med Name: [id number]] 160 each 0     Sig: USE 2 PEN NEEDLES DAILY AS  DIRECTED

## 2023-11-07 ENCOUNTER — OFFICE VISIT (OUTPATIENT)
Dept: CARDIOLOGY CLINIC | Age: 68
End: 2023-11-07
Payer: MEDICARE

## 2023-11-07 VITALS
OXYGEN SATURATION: 92 % | SYSTOLIC BLOOD PRESSURE: 126 MMHG | HEART RATE: 88 BPM | WEIGHT: 199.6 LBS | HEIGHT: 64 IN | DIASTOLIC BLOOD PRESSURE: 72 MMHG | BODY MASS INDEX: 34.08 KG/M2

## 2023-11-07 DIAGNOSIS — I10 ESSENTIAL HYPERTENSION: ICD-10-CM

## 2023-11-07 DIAGNOSIS — R09.89 BILATERAL CAROTID BRUITS: ICD-10-CM

## 2023-11-07 DIAGNOSIS — Z00.00 PE (PHYSICAL EXAM), ANNUAL: Primary | ICD-10-CM

## 2023-11-07 DIAGNOSIS — F17.200 SMOKER: ICD-10-CM

## 2023-11-07 DIAGNOSIS — G90.A POTS (POSTURAL ORTHOSTATIC TACHYCARDIA SYNDROME): ICD-10-CM

## 2023-11-07 DIAGNOSIS — G45.9 TIA (TRANSIENT ISCHEMIC ATTACK): ICD-10-CM

## 2023-11-07 PROCEDURE — 99214 OFFICE O/P EST MOD 30 MIN: CPT | Performed by: INTERNAL MEDICINE

## 2023-11-07 PROCEDURE — 3078F DIAST BP <80 MM HG: CPT | Performed by: INTERNAL MEDICINE

## 2023-11-07 PROCEDURE — 93000 ELECTROCARDIOGRAM COMPLETE: CPT | Performed by: INTERNAL MEDICINE

## 2023-11-07 PROCEDURE — 3074F SYST BP LT 130 MM HG: CPT | Performed by: INTERNAL MEDICINE

## 2023-11-07 PROCEDURE — 1123F ACP DISCUSS/DSCN MKR DOCD: CPT | Performed by: INTERNAL MEDICINE

## 2023-11-07 RX ORDER — HYDROXYZINE 50 MG/1
50 TABLET, FILM COATED ORAL NIGHTLY PRN
COMMUNITY
Start: 2023-09-13

## 2023-11-07 RX ORDER — METHOCARBAMOL 750 MG/1
TABLET, FILM COATED ORAL
COMMUNITY
Start: 2023-10-18

## 2023-11-07 ASSESSMENT — ENCOUNTER SYMPTOMS
STRIDOR: 0
NAUSEA: 0
SHORTNESS OF BREATH: 0
COUGH: 0
EYES NEGATIVE: 1
BLOOD IN STOOL: 0
GASTROINTESTINAL NEGATIVE: 1
CHEST TIGHTNESS: 0
RESPIRATORY NEGATIVE: 1
WHEEZING: 0

## 2023-11-07 NOTE — PROGRESS NOTES
Combined forms of age-related cataract of both eyes    COPD (chronic obstructive pulmonary disease) (HCC)    Dermatochalasis of both upper eyelids    Diabetes mellitus type 2 without retinopathy (HCC)    HTN (hypertension)    Neuropathy, lower extremity    Depression    POTS (postural orthostatic tachycardia syndrome)    Smoker    Post laminectomy syndrome    TIA (transient ischemic attack)    Morbidly obese (HCC)    Calcification of abdominal aorta (HCC)    Mass of left side of neck    Gastroesophageal reflux disease       There are no discontinued medications. Modified Medications    No medications on file       No orders of the defined types were placed in this encounter. Assessment/Plan:    1. POTS (postural orthostatic tachycardia syndrome)   stable no palps nor dizziness. 2. Essential hypertension   stable continue meds. Low salt diet     3. TIA (transient ischemic attack)     - US CAROTID ARTERY BILATERAL - mild     4. HORN (dyspnea on exertion)  spect small apical scar. - no CP. No SOB  Stop smoking    5. Severe obesity (BMI 35.0-39. 9) with comorbidity (HCC)   loose weight     6. Bilateral carotid bruits     - US CAROTID ARTERY BILATERAL- mild - f/u US     7. Preop Back surgery- pt is cleared. She was told no surgery needed. No further back pain. Counseling:  Heart Healthy Lifestyle, Improve BMI, Stop Smoking, Low Salt Diet, Take Precautions to Prevent Falls and Walk Daily    Return in about 6 months (around 5/7/2024).       Electronically signed by Bennetta Dancer, MD on 11/7/2023 at 12:37 PM

## 2023-11-16 ENCOUNTER — HOSPITAL ENCOUNTER (OUTPATIENT)
Dept: ULTRASOUND IMAGING | Age: 68
Discharge: HOME | End: 2023-11-18
Attending: INTERNAL MEDICINE
Payer: MEDICARE

## 2023-11-16 DIAGNOSIS — F17.200 SMOKER: ICD-10-CM

## 2023-11-16 DIAGNOSIS — I10 ESSENTIAL HYPERTENSION: ICD-10-CM

## 2023-11-16 DIAGNOSIS — R09.89 BILATERAL CAROTID BRUITS: ICD-10-CM

## 2023-11-16 LAB
VAS AORTA DIST AP: 1.45 CM
VAS AORTA DIST TR: 1.13 CM
VAS AORTA MID AP: 1.69 CM
VAS AORTA MID TRANS: 1.92 CM
VAS AORTA PROX AP: 2.36 CM
VAS AORTA PROX TR: 1.97 CM
VAS LEFT CCA DIST EDV: 30.7 CM/S
VAS LEFT CCA DIST PSV: 76 CM/S
VAS LEFT CCA MID EDV: 32.5 CM/S
VAS LEFT CCA MID PSV: 92.4 CM/S
VAS LEFT CCA PROX EDV: 26.3 CM/S
VAS LEFT CCA PROX PSV: 72.4 CM/S
VAS LEFT COM ILIAC AP: 0.82 CM
VAS LEFT COM ILIAC TRANS: 1.01 CM
VAS LEFT ECA EDV: 28.8 CM/S
VAS LEFT ECA PSV: 92.4 CM/S
VAS LEFT ICA DIST EDV: 37.9 CM/S
VAS LEFT ICA DIST PSV: 106.9 CM/S
VAS LEFT ICA MID EDV: 39.7 CM/S
VAS LEFT ICA MID PSV: 101.4 CM/S
VAS LEFT ICA PROX EDV: 32.5 CM/S
VAS LEFT ICA PROX PSV: 90.5 CM/S
VAS LEFT ICA/CCA PSV: 1.2 NO UNITS
VAS LEFT VERTEBRAL EDV: 12.2 CM/S
VAS LEFT VERTEBRAL PSV: 36.6 CM/S
VAS RIGHT CCA DIST EDV: 20.3 CM/S
VAS RIGHT CCA DIST PSV: 66.9 CM/S
VAS RIGHT CCA MID EDV: 26.5 CM/S
VAS RIGHT CCA MID PSV: 79.1 CM/S
VAS RIGHT CCA PROX EDV: 6.9 CM/S
VAS RIGHT CCA PROX PSV: 65.6 CM/S
VAS RIGHT COM ILIAC AP: 0.91 CM
VAS RIGHT COM ILIAC TRANS: 1.2 CM
VAS RIGHT ECA EDV: 11.8 CM/S
VAS RIGHT ECA PSV: 91.4 CM/S
VAS RIGHT ICA DIST EDV: 19.7 CM/S
VAS RIGHT ICA DIST PSV: 66.9 CM/S
VAS RIGHT ICA MID EDV: 34.4 CM/S
VAS RIGHT ICA MID PSV: 108.1 CM/S
VAS RIGHT ICA PROX EDV: 30.1 CM/S
VAS RIGHT ICA PROX PSV: 101.2 CM/S
VAS RIGHT ICA/CCA PSV: 1.4 NO UNITS
VAS RIGHT VERTEBRAL EDV: 12 CM/S
VAS RIGHT VERTEBRAL PSV: 42.8 CM/S

## 2023-11-16 PROCEDURE — 93880 EXTRACRANIAL BILAT STUDY: CPT

## 2023-11-16 PROCEDURE — 76706 US ABDL AORTA SCREEN AAA: CPT

## 2023-11-23 DIAGNOSIS — E11.9 DIABETES MELLITUS TYPE 2 WITHOUT RETINOPATHY (HCC): ICD-10-CM

## 2023-11-24 RX ORDER — INSULIN GLARGINE 100 [IU]/ML
INJECTION, SOLUTION SUBCUTANEOUS
Qty: 60 ML | Refills: 0 | Status: SHIPPED | OUTPATIENT
Start: 2023-11-24

## 2023-11-24 NOTE — TELEPHONE ENCOUNTER
Comments:     Last Office Visit (last PCP visit):   6/6/2023    Next Visit Date:  Future Appointments   Date Time Provider Department Center   12/5/2023  8:30 AM Johnnie Livingston MD [facility]   5/8/2024 12:15 PM Jonas Flynn MD [facility]       **If hasn't been seen in over a year OR hasn't followed up according to last diabetes/ADHD visit, make appointment for patient before sending refill to provider.    Rx requested:  Requested Prescriptions     Pending Prescriptions Disp Refills    LANTUS SOLOSTAR 100 UNIT/ML injection pen [Pharmacy Med Name: Lantus SoloStar 100 UNIT/ML Subcutaneous Solution Pen-injector] 60 mL 3     Sig: INJECT SUBCUTANEOUSLY 30  UNITS TWICE DAILY

## 2023-11-29 ENCOUNTER — OFFICE VISIT (OUTPATIENT)
Dept: OTOLARYNGOLOGY | Facility: CLINIC | Age: 68
End: 2023-11-29
Payer: MEDICARE

## 2023-11-29 VITALS — BODY MASS INDEX: 33.29 KG/M2 | HEIGHT: 64 IN | WEIGHT: 195 LBS

## 2023-11-29 DIAGNOSIS — R22.1 NECK MASS: Primary | ICD-10-CM

## 2023-11-29 PROCEDURE — 31575 DIAGNOSTIC LARYNGOSCOPY: CPT | Performed by: OTOLARYNGOLOGY

## 2023-11-29 PROCEDURE — 1159F MED LIST DOCD IN RCRD: CPT | Performed by: OTOLARYNGOLOGY

## 2023-11-29 PROCEDURE — 99213 OFFICE O/P EST LOW 20 MIN: CPT | Performed by: OTOLARYNGOLOGY

## 2023-11-29 NOTE — PROGRESS NOTES
Twyla Duran is a 68 y.o. year old female patient with CYST ON NECK     Patient returns for follow-up on neck.  Patient with prior history of left neck mass.  The patient had previous neck mass which was biopsied and the mass never returned after biopsy.  She states 2 weeks ago the mass returned but woke up this morning and it is now gone.  She does complain of some discomfort in the left neck/submandibular region.  She is without other ENT related concerns.  There have been no significant changes in past medical or past surgical histories except as mentioned.      Physical Exam:   General appearance: No acute distress. Normal facies. Symmetric facial movement. No gross lesions of the face are noted.  The external ear structures appear normal. The ear canals patent and the tympanic membranes are intact without evidence of air-fluid levels, retraction, or congenital defects.  Anterior rhinoscopy notes essentially a midline nasal septum. Examination is noted for normal healthy mucosal membranes without any evidence of lesions, polyps, or exudate. The tongue is normally mobile. There are no lesions on the gingiva, buccal, or oral mucosa. There are no oral cavity masses.  The neck is negative for mass lymphadenopathy. The trachea and parotid are clear. The thyroid bed is grossly unremarkable. The salivary gland structures are grossly unremarkable.  In order to assess the larynx, flexible laryngoscopy was performed based on the patient's history.    Procedure:  After topical anesthesia, a very complete flexible laryngoscopy was performed. This examination reveals a normal appearance to the laryngeal structures including the true cords, false cords, epiglottis, base of tongue, and piriform sinus, except as noted.    Assessment/Plan   1.  Neck mass  Patient with history of left-sided neck mass but nothing concerning on exam today including flexible laryngoscope.  At this time I recommend close follow-up and we will  see her back in 4 months or sooner should this mass again return.    All questions were answered in this regard accordingly.

## 2023-11-30 DIAGNOSIS — E11.9 DIABETES MELLITUS TYPE 2 WITHOUT RETINOPATHY (HCC): ICD-10-CM

## 2023-11-30 RX ORDER — METFORMIN HYDROCHLORIDE 500 MG/1
TABLET, EXTENDED RELEASE ORAL
Qty: 180 TABLET | Refills: 0 | Status: SHIPPED | OUTPATIENT
Start: 2023-11-30

## 2023-11-30 NOTE — TELEPHONE ENCOUNTER
Comments:     Last Office Visit (last PCP visit):   6/6/2023    Next Visit Date:  Future Appointments   Date Time Provider Department Center   12/5/2023  8:30 AM Johnnie Livingston MD [facility]   5/8/2024 12:15 PM Jonas Flynn MD [facility]       **If hasn't been seen in over a year OR hasn't followed up according to last diabetes/ADHD visit, make appointment for patient before sending refill to provider.    Rx requested:  Requested Prescriptions     Pending Prescriptions Disp Refills    metFORMIN (GLUCOPHAGE-XR) 500 MG extended release tablet [Pharmacy Med Name: metFORMIN HCl  MG Oral Tablet Extended Release 24 Hour] 180 tablet 3     Sig: TAKE 1 TABLET BY MOUTH TWICE  DAILY

## 2023-12-05 ENCOUNTER — HOSPITAL ENCOUNTER (OUTPATIENT)
Age: 68
Setting detail: SPECIMEN
Discharge: HOME | End: 2023-12-05
Payer: MEDICARE

## 2023-12-05 ENCOUNTER — OFFICE VISIT (OUTPATIENT)
Dept: FAMILY MEDICINE CLINIC | Age: 68
End: 2023-12-05
Payer: MEDICARE

## 2023-12-05 VITALS
BODY MASS INDEX: 33.22 KG/M2 | OXYGEN SATURATION: 94 % | SYSTOLIC BLOOD PRESSURE: 124 MMHG | HEART RATE: 94 BPM | DIASTOLIC BLOOD PRESSURE: 80 MMHG | WEIGHT: 194.6 LBS | HEIGHT: 64 IN | TEMPERATURE: 97.1 F

## 2023-12-05 DIAGNOSIS — Z00.00 MEDICARE ANNUAL WELLNESS VISIT, SUBSEQUENT: ICD-10-CM

## 2023-12-05 DIAGNOSIS — Z00.00 ENCOUNTER FOR ANNUAL WELLNESS EXAM IN MEDICARE PATIENT: Primary | ICD-10-CM

## 2023-12-05 DIAGNOSIS — Z78.0 POSTMENOPAUSAL: ICD-10-CM

## 2023-12-05 DIAGNOSIS — Z87.891 PERSONAL HISTORY OF TOBACCO USE: ICD-10-CM

## 2023-12-05 DIAGNOSIS — E11.9 DIABETES MELLITUS TYPE 2 WITHOUT RETINOPATHY (HCC): ICD-10-CM

## 2023-12-05 DIAGNOSIS — Z23 ENCOUNTER FOR IMMUNIZATION: ICD-10-CM

## 2023-12-05 DIAGNOSIS — Z12.11 COLON CANCER SCREENING: ICD-10-CM

## 2023-12-05 DIAGNOSIS — Z00.00 ENCOUNTER FOR ANNUAL WELLNESS EXAM IN MEDICARE PATIENT: ICD-10-CM

## 2023-12-05 PROBLEM — H25.813 COMBINED FORMS OF AGE-RELATED CATARACT OF BOTH EYES: Status: RESOLVED | Noted: 2017-02-02 | Resolved: 2023-12-05

## 2023-12-05 PROBLEM — H02.831 DERMATOCHALASIS OF BOTH UPPER EYELIDS: Status: RESOLVED | Noted: 2017-02-02 | Resolved: 2023-12-05

## 2023-12-05 PROBLEM — H02.834 DERMATOCHALASIS OF BOTH UPPER EYELIDS: Status: RESOLVED | Noted: 2017-02-02 | Resolved: 2023-12-05

## 2023-12-05 PROBLEM — E66.01 MORBIDLY OBESE (HCC): Status: RESOLVED | Noted: 2020-09-16 | Resolved: 2023-12-05

## 2023-12-05 LAB
ALBUMIN SERPL-MCNC: 4 G/DL (ref 3.5–4.6)
ALP SERPL-CCNC: 82 U/L (ref 40–130)
ALT SERPL-CCNC: <5 U/L (ref 0–33)
ANION GAP SERPL CALCULATED.3IONS-SCNC: 10 MEQ/L (ref 9–15)
AST SERPL-CCNC: 18 U/L (ref 0–35)
BILIRUB SERPL-MCNC: <0.2 MG/DL (ref 0.2–0.7)
BUN SERPL-MCNC: 8 MG/DL (ref 8–23)
CALCIUM SERPL-MCNC: 9.5 MG/DL (ref 8.5–9.9)
CHLORIDE SERPL-SCNC: 105 MEQ/L (ref 95–107)
CHOLEST SERPL-MCNC: 167 MG/DL (ref 0–199)
CO2 SERPL-SCNC: 29 MEQ/L (ref 20–31)
CREAT SERPL-MCNC: 0.93 MG/DL (ref 0.5–0.9)
GLOBULIN SER CALC-MCNC: 3 G/DL (ref 2.3–3.5)
GLUCOSE FASTING: 99 MG/DL (ref 70–99)
HBA1C MFR BLD: 6.2 %
HDLC SERPL-MCNC: 42 MG/DL (ref 40–59)
LDL CHOLESTEROL CALCULATED: 104 MG/DL (ref 0–129)
POTASSIUM SERPL-SCNC: 5.3 MEQ/L (ref 3.4–4.9)
PROT SERPL-MCNC: 7 G/DL (ref 6.3–8)
SODIUM SERPL-SCNC: 144 MEQ/L (ref 135–144)
TRIGLYCERIDE, FASTING: 105 MG/DL (ref 0–150)

## 2023-12-05 PROCEDURE — 1123F ACP DISCUSS/DSCN MKR DOCD: CPT | Performed by: FAMILY MEDICINE

## 2023-12-05 PROCEDURE — G0296 VISIT TO DETERM LDCT ELIG: HCPCS | Performed by: FAMILY MEDICINE

## 2023-12-05 PROCEDURE — 83036 HEMOGLOBIN GLYCOSYLATED A1C: CPT | Performed by: FAMILY MEDICINE

## 2023-12-05 PROCEDURE — 80053 COMPREHEN METABOLIC PANEL: CPT

## 2023-12-05 PROCEDURE — 99213 OFFICE O/P EST LOW 20 MIN: CPT | Performed by: FAMILY MEDICINE

## 2023-12-05 PROCEDURE — 90677 PCV20 VACCINE IM: CPT | Performed by: FAMILY MEDICINE

## 2023-12-05 PROCEDURE — 90694 VACC AIIV4 NO PRSRV 0.5ML IM: CPT | Performed by: FAMILY MEDICINE

## 2023-12-05 PROCEDURE — G0008 ADMIN INFLUENZA VIRUS VAC: HCPCS | Performed by: FAMILY MEDICINE

## 2023-12-05 PROCEDURE — 80061 LIPID PANEL: CPT

## 2023-12-05 PROCEDURE — 3044F HG A1C LEVEL LT 7.0%: CPT | Performed by: FAMILY MEDICINE

## 2023-12-05 PROCEDURE — 36415 COLL VENOUS BLD VENIPUNCTURE: CPT | Performed by: FAMILY MEDICINE

## 2023-12-05 PROCEDURE — G0009 ADMIN PNEUMOCOCCAL VACCINE: HCPCS | Performed by: FAMILY MEDICINE

## 2023-12-05 PROCEDURE — 3074F SYST BP LT 130 MM HG: CPT | Performed by: FAMILY MEDICINE

## 2023-12-05 PROCEDURE — 3079F DIAST BP 80-89 MM HG: CPT | Performed by: FAMILY MEDICINE

## 2023-12-05 PROCEDURE — G0439 PPPS, SUBSEQ VISIT: HCPCS | Performed by: FAMILY MEDICINE

## 2023-12-05 RX ORDER — TRAMADOL HYDROCHLORIDE 50 MG/1
TABLET ORAL
COMMUNITY
Start: 2023-12-04

## 2023-12-05 ASSESSMENT — PATIENT HEALTH QUESTIONNAIRE - PHQ9
2. FEELING DOWN, DEPRESSED OR HOPELESS: 0
SUM OF ALL RESPONSES TO PHQ9 QUESTIONS 1 & 2: 0
SUM OF ALL RESPONSES TO PHQ QUESTIONS 1-9: 4
3. TROUBLE FALLING OR STAYING ASLEEP: 2
1. LITTLE INTEREST OR PLEASURE IN DOING THINGS: 0
SUM OF ALL RESPONSES TO PHQ QUESTIONS 1-9: 4
7. TROUBLE CONCENTRATING ON THINGS, SUCH AS READING THE NEWSPAPER OR WATCHING TELEVISION: 0
10. IF YOU CHECKED OFF ANY PROBLEMS, HOW DIFFICULT HAVE THESE PROBLEMS MADE IT FOR YOU TO DO YOUR WORK, TAKE CARE OF THINGS AT HOME, OR GET ALONG WITH OTHER PEOPLE: 0
9. THOUGHTS THAT YOU WOULD BE BETTER OFF DEAD, OR OF HURTING YOURSELF: 0
SUM OF ALL RESPONSES TO PHQ QUESTIONS 1-9: 4
4. FEELING TIRED OR HAVING LITTLE ENERGY: 2
6. FEELING BAD ABOUT YOURSELF - OR THAT YOU ARE A FAILURE OR HAVE LET YOURSELF OR YOUR FAMILY DOWN: 0
5. POOR APPETITE OR OVEREATING: 0
SUM OF ALL RESPONSES TO PHQ QUESTIONS 1-9: 4
8. MOVING OR SPEAKING SO SLOWLY THAT OTHER PEOPLE COULD HAVE NOTICED. OR THE OPPOSITE, BEING SO FIGETY OR RESTLESS THAT YOU HAVE BEEN MOVING AROUND A LOT MORE THAN USUAL: 0

## 2023-12-05 ASSESSMENT — ENCOUNTER SYMPTOMS
SHORTNESS OF BREATH: 0
DIARRHEA: 0
COUGH: 0
RHINORRHEA: 0
CONSTIPATION: 0
SORE THROAT: 0
WHEEZING: 0
ABDOMINAL PAIN: 0

## 2023-12-05 ASSESSMENT — COLUMBIA-SUICIDE SEVERITY RATING SCALE - C-SSRS
5. HAVE YOU STARTED TO WORK OUT OR WORKED OUT THE DETAILS OF HOW TO KILL YOURSELF? DO YOU INTEND TO CARRY OUT THIS PLAN?: NO
4. HAVE YOU HAD THESE THOUGHTS AND HAD SOME INTENTION OF ACTING ON THEM?: NO
7. DID THIS OCCUR IN THE LAST THREE MONTHS: NO
3. HAVE YOU BEEN THINKING ABOUT HOW YOU MIGHT KILL YOURSELF?: NO

## 2023-12-05 ASSESSMENT — LIFESTYLE VARIABLES
HOW OFTEN DO YOU HAVE A DRINK CONTAINING ALCOHOL: MONTHLY OR LESS
HOW MANY STANDARD DRINKS CONTAINING ALCOHOL DO YOU HAVE ON A TYPICAL DAY: 1 OR 2

## 2023-12-05 NOTE — PROGRESS NOTES
[address] PRIMARY CARE  [place] [address]  Dept: [phone]  Dept Fax: [phone]  Loc: [phone]     Chief Complaint  Chief Complaint   Patient presents with    Medicare AW    Diabetes       HPI:  79 y. o.female who presents for the following:      DM2: a1c 6.2 from 6.1; compliant with meds; compliant with diet; No excessive thirst, urination, or blurry vision. Current diabetes regimen:   - Metformin  - Lantus 24 BID       Review of Systems   Constitutional:  Negative for chills and fever. HENT:  Negative for congestion, rhinorrhea and sore throat. Respiratory:  Negative for cough, shortness of breath and wheezing. Gastrointestinal:  Negative for abdominal pain, constipation and diarrhea. Endocrine: Negative for polydipsia and polyuria. Genitourinary:  Negative for dysuria, frequency and urgency. Neurological:  Negative for syncope, light-headedness, numbness and headaches. Psychiatric/Behavioral:  Negative for sleep disturbance. The patient is not nervous/anxious.         Past Medical History:   Diagnosis Date    Arthritis     Cerebral artery occlusion with cerebral infarction [facility]) 2016 June    TIA / sx left sided weakness & fell & unable to get up off floor for several hours    COPD (chronic obstructive pulmonary disease) ([address])     smoking habit > 40 yrs    Depression     Headache     Hyperlipidemia     meds > 10 yrs    Hypertension     meds since TIA / 6/2016    Neuropathy     POTS (postural orthostatic tachycardia syndrome)     2000's    Restless legs syndrome     Type 2 diabetes mellitus without complication (HCC)     hx > 7 yrs     Past Surgical History:   Procedure Laterality Date    ABDOMINAL EXPLORATION SURGERY  1970s    due to abdominal pain / no definite dx    BACK SURGERY  12/24/2018    lumbar disc OR at [facility] 1989    due to tendonitis

## 2023-12-05 NOTE — PROGRESS NOTES
Vaccine Information Sheet, \"Influenza - Inactivated\" OR \"Live - Intranasal\"  given to Carli Whipple, or parent/legal guardian of  Carli Whipple and verbalized understanding. Patient responses:    Have you ever had a reaction to a flu vaccine? No  Are you able to eat eggs without adverse effects? Yes  Do you have any current illness? No  Have you ever had Guillian Greer Syndrome? No    Flu vaccine given per order. Please see immunization tab.

## 2023-12-18 ENCOUNTER — PREP FOR PROCEDURE (OUTPATIENT)
Dept: GASTROENTEROLOGY | Age: 68
End: 2023-12-18

## 2023-12-18 DIAGNOSIS — Z86.010 HISTORY OF COLON POLYPS: ICD-10-CM

## 2023-12-18 PROBLEM — Z86.0100 HISTORY OF COLON POLYPS: Status: ACTIVE | Noted: 2023-12-18

## 2023-12-25 ENCOUNTER — HOSPITAL ENCOUNTER (INPATIENT)
Age: 68
LOS: 2 days | Discharge: HOME | DRG: 605 | End: 2023-12-27
Attending: INTERNAL MEDICINE | Admitting: FAMILY MEDICINE
Payer: MEDICARE

## 2023-12-25 ENCOUNTER — HOSPITAL ENCOUNTER (EMERGENCY)
Age: 68
Discharge: ANOTHER HOSPITAL | End: 2023-12-25
Attending: EMERGENCY MEDICINE
Payer: MEDICARE

## 2023-12-25 ENCOUNTER — APPOINTMENT (OUTPATIENT)
Dept: CT IMAGING | Age: 68
End: 2023-12-25
Payer: MEDICARE

## 2023-12-25 VITALS
WEIGHT: 193 LBS | HEART RATE: 93 BPM | RESPIRATION RATE: 20 BRPM | BODY MASS INDEX: 34.2 KG/M2 | OXYGEN SATURATION: 95 % | HEIGHT: 63 IN | TEMPERATURE: 98.8 F | SYSTOLIC BLOOD PRESSURE: 103 MMHG | DIASTOLIC BLOOD PRESSURE: 62 MMHG

## 2023-12-25 DIAGNOSIS — S30.1XXA ABDOMINAL HEMATOMA: Primary | ICD-10-CM

## 2023-12-25 DIAGNOSIS — D72.829 LEUKOCYTOSIS, UNSPECIFIED TYPE: ICD-10-CM

## 2023-12-25 LAB
ALBUMIN SERPL-MCNC: 4 G/DL (ref 3.5–4.6)
ALP SERPL-CCNC: 89 U/L (ref 40–130)
ALT SERPL-CCNC: 7 U/L (ref 0–33)
ANION GAP SERPL CALCULATED.3IONS-SCNC: 12 MEQ/L (ref 9–15)
ANION GAP SERPL CALCULATED.3IONS-SCNC: 9 MEQ/L (ref 9–15)
APTT PPP: 29.5 SEC (ref 24.4–36.8)
AST SERPL-CCNC: 12 U/L (ref 0–35)
BACTERIA URNS QL MICRO: ABNORMAL /HPF
BASOPHILS # BLD: 0 K/UL (ref 0–0.1)
BASOPHILS # BLD: 0.1 K/UL (ref 0–0.2)
BASOPHILS NFR BLD: 0.3 %
BASOPHILS NFR BLD: 0.3 % (ref 0.1–1.2)
BILIRUB SERPL-MCNC: <0.2 MG/DL (ref 0.2–0.7)
BILIRUB UR QL STRIP: NEGATIVE
BUN SERPL-MCNC: 11 MG/DL (ref 8–23)
BUN SERPL-MCNC: 8 MG/DL (ref 8–23)
CALCIUM SERPL-MCNC: 8.3 MG/DL (ref 8.5–9.9)
CALCIUM SERPL-MCNC: 9.3 MG/DL (ref 8.5–9.9)
CHLORIDE SERPL-SCNC: 105 MEQ/L (ref 95–107)
CHLORIDE SERPL-SCNC: 99 MEQ/L (ref 95–107)
CLARITY UR: CLEAR
CO2 SERPL-SCNC: 24 MEQ/L (ref 20–31)
CO2 SERPL-SCNC: 28 MEQ/L (ref 20–31)
COLOR UR: YELLOW
CREAT SERPL-MCNC: 0.68 MG/DL (ref 0.5–0.9)
CREAT SERPL-MCNC: 0.93 MG/DL (ref 0.5–0.9)
EOSINOPHIL # BLD: 0 K/UL (ref 0–0.4)
EOSINOPHIL # BLD: 0.2 K/UL (ref 0–0.7)
EOSINOPHIL NFR BLD: 0.1 % (ref 0.7–5.8)
EOSINOPHIL NFR BLD: 0.7 %
EPI CELLS #/AREA URNS HPF: ABNORMAL /HPF
ERYTHROCYTE [DISTWIDTH] IN BLOOD BY AUTOMATED COUNT: 16.2 % (ref 11.7–14.4)
ERYTHROCYTE [DISTWIDTH] IN BLOOD BY AUTOMATED COUNT: 16.8 % (ref 11.5–14.5)
GLOBULIN SER CALC-MCNC: 3.2 G/DL (ref 2.3–3.5)
GLUCOSE BLD-MCNC: 93 MG/DL (ref 70–99)
GLUCOSE SERPL-MCNC: 230 MG/DL (ref 70–99)
GLUCOSE SERPL-MCNC: 89 MG/DL (ref 70–99)
GLUCOSE UR STRIP-MCNC: NEGATIVE MG/DL
HCT VFR BLD AUTO: 39.6 % (ref 37–47)
HCT VFR BLD AUTO: 44 % (ref 37–47)
HGB BLD-MCNC: 12.8 G/DL (ref 12–16)
HGB BLD-MCNC: 14.5 G/DL (ref 11.2–15.7)
HGB UR QL STRIP: NEGATIVE
IMM GRANULOCYTES # BLD: 0.2 K/UL
IMM GRANULOCYTES NFR BLD: 0.9 %
INR PPP: 1
KETONES UR STRIP-MCNC: NEGATIVE MG/DL
LACTATE BLDV-SCNC: 1.7 MMOL/L (ref 0.5–2.2)
LACTATE BLDV-SCNC: 4.1 MMOL/L (ref 0.5–2.2)
LEUKOCYTE ESTERASE UR QL STRIP: NORMAL
LIPASE SERPL-CCNC: 11 U/L (ref 12–95)
LYMPHOCYTES # BLD: 2.3 K/UL (ref 1.2–3.7)
LYMPHOCYTES # BLD: 4.4 K/UL (ref 1–4.8)
LYMPHOCYTES NFR BLD: 20.5 %
LYMPHOCYTES NFR BLD: 9 %
MAGNESIUM SERPL-MCNC: 1.9 MG/DL (ref 1.7–2.4)
MCH RBC QN AUTO: 29 PG (ref 27–31.3)
MCH RBC QN AUTO: 29.9 PG (ref 25.6–32.2)
MCHC RBC AUTO-ENTMCNC: 32.3 % (ref 33–37)
MCHC RBC AUTO-ENTMCNC: 33 % (ref 32.2–35.5)
MCV RBC AUTO: 89.8 FL (ref 79.4–94.8)
MCV RBC AUTO: 90.7 FL (ref 79.4–94.8)
MONOCYTES # BLD: 0.5 K/UL (ref 0.2–0.9)
MONOCYTES # BLD: 0.9 K/UL (ref 0.2–0.8)
MONOCYTES NFR BLD: 2 % (ref 4.7–12.5)
MONOCYTES NFR BLD: 4.2 %
NEUTROPHILS # BLD: 15.9 K/UL (ref 1.4–6.5)
NEUTROPHILS # BLD: 22.4 K/UL (ref 1.6–6.1)
NEUTS BAND NFR BLD MANUAL: 15 % (ref 5–11)
NEUTS SEG NFR BLD: 73.7 %
NEUTS SEG NFR BLD: 74 % (ref 34–71.1)
NITRITE UR QL STRIP: POSITIVE
PERFORMED ON: NORMAL
PH UR STRIP: 7 [PH] (ref 5–9)
PLATELET # BLD AUTO: 251 K/UL (ref 130–400)
PLATELET # BLD AUTO: 290 K/UL (ref 182–369)
POTASSIUM SERPL-SCNC: 4.2 MEQ/L (ref 3.4–4.9)
POTASSIUM SERPL-SCNC: 4.7 MEQ/L (ref 3.4–4.9)
PROCALCITONIN SERPL IA-MCNC: 0.06 NG/ML (ref 0–0.15)
PROT SERPL-MCNC: 7.2 G/DL (ref 6.3–8)
PROT UR STRIP-MCNC: NEGATIVE MG/DL
PROTHROMBIN TIME: 13.3 SEC (ref 12.3–14.9)
RBC # BLD AUTO: 4.41 M/UL (ref 4.2–5.4)
RBC # BLD AUTO: 4.85 M/UL (ref 3.93–5.22)
RBC #/AREA URNS HPF: ABNORMAL /HPF (ref 0–2)
SODIUM SERPL-SCNC: 135 MEQ/L (ref 135–144)
SODIUM SERPL-SCNC: 142 MEQ/L (ref 135–144)
SP GR UR STRIP: 1.01 (ref 1–1.03)
URINE REFLEX TO CULTURE: NORMAL
UROBILINOGEN UR STRIP-ACNC: 0.2 E.U./DL
WBC # BLD AUTO: 21.6 K/UL (ref 4.8–10.8)
WBC # BLD AUTO: 25.2 K/UL (ref 4–10)
WBC #/AREA URNS HPF: ABNORMAL /HPF (ref 0–5)

## 2023-12-25 PROCEDURE — 6360000002 HC RX W HCPCS

## 2023-12-25 PROCEDURE — 74177 CT ABD & PELVIS W/CONTRAST: CPT

## 2023-12-25 PROCEDURE — 36415 COLL VENOUS BLD VENIPUNCTURE: CPT

## 2023-12-25 PROCEDURE — 84145 PROCALCITONIN (PCT): CPT

## 2023-12-25 PROCEDURE — 96375 TX/PRO/DX INJ NEW DRUG ADDON: CPT

## 2023-12-25 PROCEDURE — 87040 BLOOD CULTURE FOR BACTERIA: CPT

## 2023-12-25 PROCEDURE — 2580000003 HC RX 258: Performed by: NURSE PRACTITIONER

## 2023-12-25 PROCEDURE — 83690 ASSAY OF LIPASE: CPT

## 2023-12-25 PROCEDURE — 83605 ASSAY OF LACTIC ACID: CPT

## 2023-12-25 PROCEDURE — 80053 COMPREHEN METABOLIC PANEL: CPT

## 2023-12-25 PROCEDURE — 81001 URINALYSIS AUTO W/SCOPE: CPT

## 2023-12-25 PROCEDURE — 96365 THER/PROPH/DIAG IV INF INIT: CPT

## 2023-12-25 PROCEDURE — 6360000004 HC RX CONTRAST MEDICATION

## 2023-12-25 PROCEDURE — 2580000003 HC RX 258

## 2023-12-25 PROCEDURE — 83735 ASSAY OF MAGNESIUM: CPT

## 2023-12-25 PROCEDURE — 80048 BASIC METABOLIC PNL TOTAL CA: CPT

## 2023-12-25 PROCEDURE — 85025 COMPLETE CBC W/AUTO DIFF WBC: CPT

## 2023-12-25 PROCEDURE — 99285 EMERGENCY DEPT VISIT HI MDM: CPT

## 2023-12-25 PROCEDURE — 1210000000 HC MED SURG R&B

## 2023-12-25 PROCEDURE — 85610 PROTHROMBIN TIME: CPT

## 2023-12-25 PROCEDURE — 85730 THROMBOPLASTIN TIME PARTIAL: CPT

## 2023-12-25 RX ORDER — SODIUM CHLORIDE 9 MG/ML
INJECTION, SOLUTION INTRAVENOUS PRN
Status: DISCONTINUED | OUTPATIENT
Start: 2023-12-25 | End: 2023-12-27 | Stop reason: HOSPADM

## 2023-12-25 RX ORDER — ONDANSETRON 2 MG/ML
4 INJECTION INTRAMUSCULAR; INTRAVENOUS EVERY 6 HOURS PRN
Status: DISCONTINUED | OUTPATIENT
Start: 2023-12-25 | End: 2023-12-27 | Stop reason: HOSPADM

## 2023-12-25 RX ORDER — GLUCAGON 1 MG/ML
1 KIT INJECTION PRN
Status: DISCONTINUED | OUTPATIENT
Start: 2023-12-25 | End: 2023-12-27 | Stop reason: HOSPADM

## 2023-12-25 RX ORDER — ONDANSETRON 2 MG/ML
4 INJECTION INTRAMUSCULAR; INTRAVENOUS ONCE
Status: COMPLETED | OUTPATIENT
Start: 2023-12-25 | End: 2023-12-25

## 2023-12-25 RX ORDER — INSULIN LISPRO 100 [IU]/ML
0-4 INJECTION, SOLUTION INTRAVENOUS; SUBCUTANEOUS NIGHTLY
Status: DISCONTINUED | OUTPATIENT
Start: 2023-12-25 | End: 2023-12-27 | Stop reason: HOSPADM

## 2023-12-25 RX ORDER — METRONIDAZOLE 500 MG/100ML
500 INJECTION, SOLUTION INTRAVENOUS ONCE
Status: DISCONTINUED | OUTPATIENT
Start: 2023-12-25 | End: 2023-12-25

## 2023-12-25 RX ORDER — ONDANSETRON 4 MG/1
4 TABLET, ORALLY DISINTEGRATING ORAL EVERY 8 HOURS PRN
Status: DISCONTINUED | OUTPATIENT
Start: 2023-12-25 | End: 2023-12-27 | Stop reason: HOSPADM

## 2023-12-25 RX ORDER — SODIUM CHLORIDE 0.9 % (FLUSH) 0.9 %
5-40 SYRINGE (ML) INJECTION EVERY 12 HOURS SCHEDULED
Status: DISCONTINUED | OUTPATIENT
Start: 2023-12-25 | End: 2023-12-27 | Stop reason: HOSPADM

## 2023-12-25 RX ORDER — ACETAMINOPHEN 650 MG/1
650 SUPPOSITORY RECTAL EVERY 6 HOURS PRN
Status: DISCONTINUED | OUTPATIENT
Start: 2023-12-25 | End: 2023-12-27 | Stop reason: HOSPADM

## 2023-12-25 RX ORDER — POLYETHYLENE GLYCOL 3350 17 G/17G
17 POWDER, FOR SOLUTION ORAL DAILY PRN
Status: DISCONTINUED | OUTPATIENT
Start: 2023-12-25 | End: 2023-12-27 | Stop reason: HOSPADM

## 2023-12-25 RX ORDER — MORPHINE SULFATE 4 MG/ML
4 INJECTION, SOLUTION INTRAMUSCULAR; INTRAVENOUS ONCE
Status: COMPLETED | OUTPATIENT
Start: 2023-12-25 | End: 2023-12-25

## 2023-12-25 RX ORDER — INSULIN LISPRO 100 [IU]/ML
0-8 INJECTION, SOLUTION INTRAVENOUS; SUBCUTANEOUS
Status: DISCONTINUED | OUTPATIENT
Start: 2023-12-26 | End: 2023-12-27 | Stop reason: HOSPADM

## 2023-12-25 RX ORDER — ACETAMINOPHEN 325 MG/1
650 TABLET ORAL EVERY 6 HOURS PRN
Status: DISCONTINUED | OUTPATIENT
Start: 2023-12-25 | End: 2023-12-27 | Stop reason: HOSPADM

## 2023-12-25 RX ORDER — SODIUM CHLORIDE 0.9 % (FLUSH) 0.9 %
5-40 SYRINGE (ML) INJECTION PRN
Status: DISCONTINUED | OUTPATIENT
Start: 2023-12-25 | End: 2023-12-27 | Stop reason: HOSPADM

## 2023-12-25 RX ORDER — 0.9 % SODIUM CHLORIDE 0.9 %
1000 INTRAVENOUS SOLUTION INTRAVENOUS ONCE
Status: COMPLETED | OUTPATIENT
Start: 2023-12-25 | End: 2023-12-25

## 2023-12-25 RX ORDER — DEXTROSE MONOHYDRATE 100 MG/ML
INJECTION, SOLUTION INTRAVENOUS CONTINUOUS PRN
Status: DISCONTINUED | OUTPATIENT
Start: 2023-12-25 | End: 2023-12-27 | Stop reason: HOSPADM

## 2023-12-25 RX ADMIN — VANCOMYCIN HYDROCHLORIDE 1000 MG: 1 INJECTION, POWDER, LYOPHILIZED, FOR SOLUTION INTRAVENOUS at 17:44

## 2023-12-25 RX ADMIN — MORPHINE SULFATE 4 MG: 4 INJECTION, SOLUTION INTRAMUSCULAR; INTRAVENOUS at 14:55

## 2023-12-25 RX ADMIN — SODIUM CHLORIDE 1000 ML: 9 INJECTION, SOLUTION INTRAVENOUS at 14:54

## 2023-12-25 RX ADMIN — IOPAMIDOL 75 ML: 755 INJECTION, SOLUTION INTRAVENOUS at 15:25

## 2023-12-25 RX ADMIN — ONDANSETRON 4 MG: 2 INJECTION INTRAMUSCULAR; INTRAVENOUS at 14:55

## 2023-12-25 RX ADMIN — Medication 5 ML: at 22:08

## 2023-12-25 NOTE — ED NOTES
Transfer center called with bed assignment [phone], [id number]. Transport to be [facility].

## 2023-12-25 NOTE — ED NOTES
Dr. Konrad Estes on phone with Eddie Valdes NP.  Electronically signed by Mich Pimentel RN on 12/25/2023 at 5:56 PM

## 2023-12-25 NOTE — ED NOTES
given information for room number and telephone number to floor at [facility] AT [place]

## 2023-12-25 NOTE — ED PROVIDER NOTES
reassess patient she denies any injury or trauma, denies any ETOH use. She states this abdominal pain started this morning. No bruising visible  over tender area of abdomen. Patient is on Plavix. Call put out to discuss case with Dr. Luz Morley, general surgeon. Dr. Luz Morley called back he states the patient is non surgical and warrants observation admission under the hospitalist, he will be on consult. Call placed to [facility] to discuss case with hospitalist on call. Discussed case with Dr. Robert Camarillo he will accept patient to [facility], with surgeon on consult. Awaiting transport at this time, patient and family aware and agree with plan of care. Patient transferred to [facility] via [facility] in stable condition. PROCEDURES:    Procedures      FINAL IMPRESSION      1. Abdominal hematoma    2. Leukocytosis, unspecified type          DISPOSITION/PLAN   DISPOSITION Decision To Transfer 12/25/2023 05:58:53 PM      PATIENT REFERRED TO:  No follow-up provider specified.     DISCHARGE MEDICATIONS:  New Prescriptions    No medications on file       (Please note that portions of this note were completed with a voice recognition program.  Efforts were made to edit the dictations but occasionally words are mis-transcribed.)    HILLARY Bland - HILLARY Greenfield CNP  12/25/23 [id number]

## 2023-12-25 NOTE — ED NOTES
Called transfer center, talked to Ella Hull, she will page to [facility] hospitalist for Nathaniel Tyler NP.

## 2023-12-25 NOTE — ED TRIAGE NOTES
Pt to ER with  for RLQ pain that started earlier today. Patient states she has nausea and dry heaving but denies diarrhea or vomiting. Axox4.  Electronically signed by Estephania Shultz RN on 12/25/2023 at 2:30 PM

## 2023-12-26 LAB
ALBUMIN SERPL-MCNC: 3.3 G/DL (ref 3.5–4.6)
ALP SERPL-CCNC: 69 U/L (ref 40–130)
ALT SERPL-CCNC: 6 U/L (ref 0–33)
ANION GAP SERPL CALCULATED.3IONS-SCNC: 9 MEQ/L (ref 9–15)
AST SERPL-CCNC: 9 U/L (ref 0–35)
BASOPHILS # BLD: 0.1 K/UL (ref 0–0.2)
BASOPHILS NFR BLD: 0.3 %
BILIRUB SERPL-MCNC: <0.2 MG/DL (ref 0.2–0.7)
BUN SERPL-MCNC: 8 MG/DL (ref 8–23)
CALCIUM SERPL-MCNC: 8.3 MG/DL (ref 8.5–9.9)
CHLORIDE SERPL-SCNC: 105 MEQ/L (ref 95–107)
CO2 SERPL-SCNC: 29 MEQ/L (ref 20–31)
CREAT SERPL-MCNC: 0.72 MG/DL (ref 0.5–0.9)
EOSINOPHIL # BLD: 0.2 K/UL (ref 0–0.7)
EOSINOPHIL NFR BLD: 1 %
ERYTHROCYTE [DISTWIDTH] IN BLOOD BY AUTOMATED COUNT: 16.6 % (ref 11.5–14.5)
GLOBULIN SER CALC-MCNC: 2.4 G/DL (ref 2.3–3.5)
GLUCOSE BLD-MCNC: 145 MG/DL (ref 70–99)
GLUCOSE BLD-MCNC: 153 MG/DL (ref 70–99)
GLUCOSE BLD-MCNC: 191 MG/DL (ref 70–99)
GLUCOSE SERPL-MCNC: 106 MG/DL (ref 70–99)
HCT VFR BLD AUTO: 37.2 % (ref 37–47)
HGB BLD-MCNC: 11.9 G/DL (ref 12–16)
LYMPHOCYTES # BLD: 3.8 K/UL (ref 1–4.8)
LYMPHOCYTES NFR BLD: 22.8 %
MAGNESIUM SERPL-MCNC: 2.1 MG/DL (ref 1.7–2.4)
MCH RBC QN AUTO: 28.9 PG (ref 27–31.3)
MCHC RBC AUTO-ENTMCNC: 32 % (ref 33–37)
MCV RBC AUTO: 90.3 FL (ref 79.4–94.8)
MONOCYTES # BLD: 0.7 K/UL (ref 0.2–0.8)
MONOCYTES NFR BLD: 4.3 %
NEUTROPHILS # BLD: 11.8 K/UL (ref 1.4–6.5)
NEUTS SEG NFR BLD: 71.1 %
PERFORMED ON: ABNORMAL
PLATELET # BLD AUTO: 217 K/UL (ref 130–400)
POTASSIUM SERPL-SCNC: 4.2 MEQ/L (ref 3.4–4.9)
PROT SERPL-MCNC: 5.7 G/DL (ref 6.3–8)
RBC # BLD AUTO: 4.12 M/UL (ref 4.2–5.4)
SODIUM SERPL-SCNC: 143 MEQ/L (ref 135–144)
WBC # BLD AUTO: 16.5 K/UL (ref 4.8–10.8)

## 2023-12-26 PROCEDURE — 85025 COMPLETE CBC W/AUTO DIFF WBC: CPT

## 2023-12-26 PROCEDURE — 2700000000 HC OXYGEN THERAPY PER DAY

## 2023-12-26 PROCEDURE — 6370000000 HC RX 637 (ALT 250 FOR IP): Performed by: NURSE PRACTITIONER

## 2023-12-26 PROCEDURE — 36415 COLL VENOUS BLD VENIPUNCTURE: CPT

## 2023-12-26 PROCEDURE — 99221 1ST HOSP IP/OBS SF/LOW 40: CPT | Performed by: SURGERY

## 2023-12-26 PROCEDURE — 2580000003 HC RX 258: Performed by: NURSE PRACTITIONER

## 2023-12-26 PROCEDURE — 1210000000 HC MED SURG R&B

## 2023-12-26 PROCEDURE — 83735 ASSAY OF MAGNESIUM: CPT

## 2023-12-26 PROCEDURE — 80053 COMPREHEN METABOLIC PANEL: CPT

## 2023-12-26 RX ORDER — TRAMADOL HYDROCHLORIDE 50 MG/1
50 TABLET ORAL EVERY 6 HOURS PRN
Status: DISCONTINUED | OUTPATIENT
Start: 2023-12-26 | End: 2023-12-27 | Stop reason: HOSPADM

## 2023-12-26 RX ORDER — LISINOPRIL 10 MG/1
10 TABLET ORAL DAILY
Status: DISCONTINUED | OUTPATIENT
Start: 2023-12-27 | End: 2023-12-27 | Stop reason: HOSPADM

## 2023-12-26 RX ORDER — ATORVASTATIN CALCIUM 20 MG/1
20 TABLET, FILM COATED ORAL DAILY
Status: DISCONTINUED | OUTPATIENT
Start: 2023-12-27 | End: 2023-12-27 | Stop reason: HOSPADM

## 2023-12-26 RX ORDER — VENLAFAXINE HYDROCHLORIDE 150 MG/1
150 CAPSULE, EXTENDED RELEASE ORAL DAILY
Status: DISCONTINUED | OUTPATIENT
Start: 2023-12-27 | End: 2023-12-27 | Stop reason: HOSPADM

## 2023-12-26 RX ORDER — HYDROXYZINE HYDROCHLORIDE 25 MG/1
50 TABLET, FILM COATED ORAL NIGHTLY PRN
Status: DISCONTINUED | OUTPATIENT
Start: 2023-12-26 | End: 2023-12-27 | Stop reason: HOSPADM

## 2023-12-26 RX ORDER — PANTOPRAZOLE SODIUM 40 MG/1
40 TABLET, DELAYED RELEASE ORAL
Status: DISCONTINUED | OUTPATIENT
Start: 2023-12-27 | End: 2023-12-27 | Stop reason: HOSPADM

## 2023-12-26 RX ORDER — LIDOCAINE 4 G/G
1 PATCH TOPICAL DAILY
Status: DISCONTINUED | OUTPATIENT
Start: 2023-12-26 | End: 2023-12-27 | Stop reason: HOSPADM

## 2023-12-26 RX ADMIN — ACETAMINOPHEN 650 MG: 325 TABLET ORAL at 17:24

## 2023-12-26 RX ADMIN — Medication 10 ML: at 11:44

## 2023-12-26 RX ADMIN — Medication 5 ML: at 20:42

## 2023-12-26 NOTE — H&P
[facility]    HISTORY AND PHYSICAL EXAM    PATIENT NAME:  Jaclyn Meeks    MRN:  [id number]  SERVICE DATE:  12/25/2023   SERVICE TIME:  9:08 PM    Primary Care Physician: Addie Claire MD         SUBJECTIVE  CHIEF COMPLAINT: ABD pain/nausea       HPI: Patient being admitted for possible abdominal hematoma. Patient is a pleasant, alert and oriented x 1 68-year-old  female. Patient states that around 530 this morning she began to have right-sided abdominal pain that she described as severe and worse with movement. Patient states that she also had dry heaves throughout the day but did not have any vomiting. Patient denies any change in bowel pattern, fever, dysuria, shortness of breath, or chest pain. Patient also denies any hematuria or hematochezia. Patient states that she has been dealing with intermittent nausea for several weeks but not having any abdominal pain. Patient denies any abdominal trauma that she can think of. Patient states that she does take insulin for diabetes and gives her self insulin in her abdomen. Several small bruises are noted in the area where she injects the insulin    Patient's other past medical history includes TIA, DM 2, HTN, HLD, and cervical radiculopathy.     PAST MEDICAL HISTORY:    Past Medical History:   Diagnosis Date    Arthritis     Cerebral artery occlusion with cerebral infarction [facility]) 2016 June    TIA / sx left sided weakness & fell & unable to get up off floor for several hours    COPD (chronic obstructive pulmonary disease) ([address])     smoking habit > 40 yrs    Depression     Headache     Hyperlipidemia     meds > 10 yrs    Hypertension     meds since TIA / 6/2016    Neuropathy     POTS (postural orthostatic tachycardia syndrome)     2000's    Restless legs syndrome     Type 2 diabetes mellitus without complication (HCC)     hx > 7 yrs     PAST SURGICAL HISTORY:    Past Surgical History:   Procedure Laterality Date    ABDOMINAL

## 2023-12-26 NOTE — CONSULTS
Pelvis: The urinary bladder is suboptimally distended and grossly unremarkable. The uterus and right ovary are unremarkable. The left ovary contains a subcentimeter hypodensity, most likely representing a cyst. Peritoneum/Retroperitoneum: Small portacaval and otilia hepatis lymph nodes are identified, measuring up to 1.2 x 0.7 cm in size. These are probably reactive. No retroperitoneal or pelvic lymphadenopathy is identified. No free fluid is seen in the abdomen and pelvis. No abdominal or pelvic soft tissue mass is appreciated. The abdominal aorta is atherosclerotic. Bones/Soft Tissues: Invested within the right rectus abdominus muscle is a large amount of ill-defined soft tissue density which extends from the level of the inferior liver down to the mid pelvis. It measures 19.0 x 5.4 x 10.6 cm in size. Its attenuation is higher than muscle and is in the 50s. Perimuscular linear stranding is identified in the deep subcutaneous soft tissues about the right rectus abdominus muscle. This is highly suspicious for a hematoma. Clinical correlation is recommended. The patient is status post lumbar spine surgery. There is a thoracolumbar scoliosis, with convexity to the left. 1. Large amount of ill-defined soft tissue density within the right rectus abdominus muscle. This is highly suspicious for a hematoma. 2. Small portacaval and otilia hepatis lymph nodes are probably reactive. 3. Left renal cortical cysts. 4. Probable subcentimeter cyst in the left ovary. ASSESSMENT AND PLAN:   Jodi Maxwell is a 79 y.o. female who states that around 530 yesterday morning she began to have right-sided abdominal pain that she described as severe and worse with movement. Patient states that she also had dry heaves throughout the day but did not have any vomiting. Patient denies any change in bowel pattern, fever, dysuria, shortness of breath, or chest pain. Patient also denies any hematuria or hematochezia.

## 2023-12-26 NOTE — CARE COORDINATION
Case Management Assessment  Initial Evaluation    Date/Time of Evaluation: 12/26/2023 1:01 PM  Assessment Completed by: Latonya Rubio RN    If patient is discharged prior to next notation, then this note serves as note for discharge by case management. Patient Name: Eulalio Sequeira                   YOB: 1955  Diagnosis: Abdominal hematoma [S30.1XXA]                   Date / Time: 12/25/2023  8:43 PM    Patient Admission Status: Inpatient   Readmission Risk (Low < 19, Mod (19-27), High > 27): Readmission Risk Score: 10.8    Current PCP: Dennis Fuchs MD  PCP verified by CM? Yes    Chart Reviewed: Yes      History Provided by: Patient  Patient Orientation: Alert and Oriented, Person, Place, Situation, Self    Patient Cognition: Alert    Hospitalization in the last 30 days (Readmission):  No    If yes, Readmission Assessment in  Navigator will be completed. Advance Directives:      Code Status: Full Code   Patient's Primary Decision Maker is: Legal Next of Kin    Primary Decision MakerLorrk Ashton - Spouse - [phone]    Secondary Decision Maker: Annette Maxim - [phone]    Discharge Planning:    Patient lives with: Spouse/Significant Other Type of Home: House  Primary Care Giver: Self  Patient Support Systems include: Spouse/Significant Other   Current Financial resources: Medicare  Current community resources: None  Current services prior to admission:  ([facility])            Current DME:  [employer]             Type of Home Care services:  None    ADLS  Prior functional level: Independent in ADLs/IADLs  Current functional level: Independent in ADLs/IADLs    PT AM-PAC:   /24  OT AM-PAC:   /24    Family can provide assistance at DC: Yes  Would you like Case Management to discuss the discharge plan with any other family members/significant others, and if so, who?  No  Plans to Return to Present Housing: Yes  Other Identified Issues/Barriers to RETURNING to

## 2023-12-26 NOTE — ACP (ADVANCE CARE PLANNING)
Advance Care Planning   Healthcare Decision Maker:    Primary Decision Maker: Saud Beckman - [phone]    Secondary Decision Maker: Meghann Gabriela - Child - [phone]    Click here to complete Healthcare Decision Makers including selection of the Healthcare Decision Maker Relationship (ie \"Primary\").

## 2023-12-26 NOTE — DISCHARGE INSTR - COC
Continuity of Care Form    Patient Name: Cynthia Robles   :  1955  MRN:  [id number]    [address] date:  2023  Discharge date:  ***    Code Status Order: Full Code   Advance Directives:     Admitting Physician:  Marge Head MD  PCP: Js Quiroz MD    Discharging Nurse: [facility] Unit/Room#: W881/M281-40  Discharging Unit Phone Number: ***    Emergency Contact:   Extended Emergency Contact Information  Primary Emergency Contact: Carmelo Lucero [place] [address] [address] Phone: [phone]  Work Phone: [phone]  Mobile Phone: [phone]  Relation: Spouse    Past Surgical History:  Past Surgical History:   Procedure Laterality Date    ABDOMINAL EXPLORATION SURGERY      due to abdominal pain / no definite dx    BACK SURGERY  2018    lumbar disc OR at [address]    due to tendonitis    ENDOMETRIAL ABLATION      AUB    OTHER SURGICAL HISTORY      pain pump placement  & replacement     [place] IMPLTJ/RPLCMT ITHCL/EDRL DRUG NFS PRGRBL PUMP N/A 2019    REMOVAL OF INTRATHECAL PUMP RESERVOIR performed by Linda Gregory MD at [facility]  2022    on a cyst on neck(benign)    TONSILLECTOMY      age 24       Immunization History:   Immunization History   Administered Date(s) Administered    COVID-19, J&J, (age 18y+), IM, 0.5 mL 2021, 2022    COVID-19, PFIZER Bivalent, DO NOT Dilute, (age 12y+), IM, 30 mcg/0.3 mL 2022    Influenza A (U5P8-13) Vaccine PF IM 2010    Influenza Virus Vaccine 2014, 2016, 2017, 10/17/2018    Influenza, AFLURIA (age 1 yrs+), FLUZONE, (age 10 mo+), MDV, 0.5mL 2016, 2017, 10/17/2018, 10/08/2019    Influenza, FLUAD, (age 72 y+), Adjuvanted, 0.5mL 2023, 2023    Influenza, FLUARIX, FLULAVAL, FLUZONE (age 10 mo+) AND AFLURIA, (age 1 y+), PF, 0.5mL 2020    Influenza, FLUCELVAX, (age 10 mo+), MDCK, PF, 0.5mL 10/05/2021

## 2023-12-27 VITALS
HEART RATE: 85 BPM | TEMPERATURE: 98.8 F | SYSTOLIC BLOOD PRESSURE: 134 MMHG | HEIGHT: 63 IN | DIASTOLIC BLOOD PRESSURE: 52 MMHG | OXYGEN SATURATION: 91 % | WEIGHT: 143.3 LBS | BODY MASS INDEX: 25.39 KG/M2 | RESPIRATION RATE: 18 BRPM

## 2023-12-27 LAB
BASOPHILS # BLD: 0.1 K/UL (ref 0–0.2)
BASOPHILS NFR BLD: 0.4 %
EOSINOPHIL # BLD: 0.2 K/UL (ref 0–0.7)
EOSINOPHIL NFR BLD: 1.1 %
ERYTHROCYTE [DISTWIDTH] IN BLOOD BY AUTOMATED COUNT: 16.6 % (ref 11.5–14.5)
GLUCOSE BLD-MCNC: 149 MG/DL (ref 70–99)
GLUCOSE BLD-MCNC: 173 MG/DL (ref 70–99)
GLUCOSE BLD-MCNC: 230 MG/DL (ref 70–99)
HCT VFR BLD AUTO: 36.5 % (ref 37–47)
HGB BLD-MCNC: 11.9 G/DL (ref 12–16)
LYMPHOCYTES # BLD: 2.4 K/UL (ref 1–4.8)
LYMPHOCYTES NFR BLD: 16.6 %
MCH RBC QN AUTO: 29.2 PG (ref 27–31.3)
MCHC RBC AUTO-ENTMCNC: 32.6 % (ref 33–37)
MCV RBC AUTO: 89.7 FL (ref 79.4–94.8)
MONOCYTES # BLD: 0.6 K/UL (ref 0.2–0.8)
MONOCYTES NFR BLD: 4.3 %
NEUTROPHILS # BLD: 11 K/UL (ref 1.4–6.5)
NEUTS SEG NFR BLD: 77.1 %
PERFORMED ON: ABNORMAL
PLATELET # BLD AUTO: 203 K/UL (ref 130–400)
RBC # BLD AUTO: 4.07 M/UL (ref 4.2–5.4)
WBC # BLD AUTO: 14.3 K/UL (ref 4.8–10.8)

## 2023-12-27 PROCEDURE — 6370000000 HC RX 637 (ALT 250 FOR IP): Performed by: STUDENT IN AN ORGANIZED HEALTH CARE EDUCATION/TRAINING PROGRAM

## 2023-12-27 PROCEDURE — 36415 COLL VENOUS BLD VENIPUNCTURE: CPT

## 2023-12-27 PROCEDURE — 2580000003 HC RX 258: Performed by: NURSE PRACTITIONER

## 2023-12-27 PROCEDURE — 85025 COMPLETE CBC W/AUTO DIFF WBC: CPT

## 2023-12-27 PROCEDURE — 6370000000 HC RX 637 (ALT 250 FOR IP): Performed by: NURSE PRACTITIONER

## 2023-12-27 PROCEDURE — 99232 SBSQ HOSP IP/OBS MODERATE 35: CPT | Performed by: SURGERY

## 2023-12-27 RX ORDER — TRAMADOL HYDROCHLORIDE 50 MG/1
50 TABLET ORAL EVERY 8 HOURS PRN
Qty: 21 TABLET | Refills: 0 | Status: SHIPPED | OUTPATIENT
Start: 2023-12-27 | End: 2024-01-03

## 2023-12-27 RX ORDER — LIDOCAINE 4 G/G
1 PATCH TOPICAL DAILY
Qty: 14 PATCH | Refills: 0 | Status: SHIPPED | OUTPATIENT
Start: 2023-12-28

## 2023-12-27 RX ADMIN — PANTOPRAZOLE SODIUM 40 MG: 40 TABLET, DELAYED RELEASE ORAL at 05:12

## 2023-12-27 RX ADMIN — Medication 10 ML: at 08:52

## 2023-12-27 RX ADMIN — VENLAFAXINE HYDROCHLORIDE 150 MG: 150 CAPSULE, EXTENDED RELEASE ORAL at 08:49

## 2023-12-27 RX ADMIN — TRAMADOL HYDROCHLORIDE 50 MG: 50 TABLET ORAL at 08:49

## 2023-12-27 RX ADMIN — TRAMADOL HYDROCHLORIDE 50 MG: 50 TABLET ORAL at 16:42

## 2023-12-27 NOTE — PROGRESS NOTES
Admission complete, see flowsheets. Pt A&Ox4, med given per mar. Med rec complete, Dr Ambriz More notified @ 5523. No other needs verbalized at this time, call light within reach.      Electronically signed by Gabriel Cope RN on 12/26/2023 at 1:09 AM
Pt assessed this AM.  Pt did report ABD earlier this shift that was relieved by PRN pain meds. ABD binder in place. Plan is for pt to DC home today with spouse. DC teaching provided. No f/u appt able to be made r/t office closed. Pt instructed to f/u with primary within one week. IV removed, catheter intact. Tele removed. Education and careplans completed.
Shift assessment complete, see flowsheets. Pt A&Ox4, meds given per mar. No other needs verbalized at this time, call light within reach. Pt requesting pain meds. [id number]: [address], NP, notified via [employer]. Awaiting response.      Electronically signed by Rossana Wall RN on 12/26/2023 at 9:54 PM
BACTERIA  --  MODERATE*  --   --   --   --   --          Assessment/ Plan      Augusta Saxena is a 79 y.o. female who states that around 530 yesterday morning she began to have right-sided abdominal pain that she described as severe and worse with movement. Patient states that she also had dry heaves throughout the day but did not have any vomiting. Patient denies any change in bowel pattern, fever, dysuria, shortness of breath, or chest pain. Patient also denies any hematuria or hematochezia. Patient states that she has been dealing with intermittent nausea for several weeks but not having any abdominal pain. Patient denies any abdominal trauma that she can think of. Patient states that she does take insulin for diabetes and gives her self insulin in her abdomen. Several small bruises are noted in the area where she injects the insulin  Patient's other past medical history includes TIA, DM 2, HTN, HLD, and cervical radiculopathy. Patient with rectus hematoma   H/H and vital signs are stable  Continue abdominal binder  There is no indication for Surgery  Please call surgery if needed.       Juana Zambrano MD   General surgeon  12/27/2023
added today or then after based on clinical progression. I am managing a portion of pt care. Some medical issues are handled by other specialists. Additional work up and treatment should be done in out pt setting by pt PCP and other out pt providers. In addition to examining and evaluating pt, I spent additional time explaining care, normal and abnormal findings, and treatment plan. All of pt questions were answered. Counseling, diet and education were  provided. Case will be discussed with nursing staff when appropriate. Family will be updated if and when appropriate. Diet: ADULT DIET;  Regular; 4 carb choices (60 gm/meal)    Code Status: Full Code    Electronically signed by Immanuel Arrieta MD on 12/26/2023 at 10:27 PM

## 2023-12-27 NOTE — DISCHARGE INSTRUCTIONS
Follow up with primary care physician in the next 7 days or sooner if needed. As discussed, discontinue your plavix and celecoxib in the setting of your hematoma and discuss restarting them with your PCP once your symptoms have resolved. Please return to ER or call 911 if you develop any significant signs or symptoms of bleeding as discussed. I may not have addressed all of your medical illnesses or the abnormal blood work or imaging therefore please ask your PCP to obtain [facility] record to follow up on all of the abnormal labs, imaging and findings that I have and have not addressed during your hospitalization. Discharging you from the hospital does not mean that your medical care ends here and now. You may still need additional work up, investigation, monitoring, and treatment to be handled from this point on by outside providers including your PCP, Specialists and other healthcare providers. For medication questions, contact your retail pharmacy and your PCP. Your medical team at [facility] ([facility]) appreciates the opportunity to work with you to get well!     Deloris Gutierrez MD  3:42 PM

## 2023-12-28 ENCOUNTER — CARE COORDINATION (OUTPATIENT)
Dept: CARE COORDINATION | Age: 68
End: 2023-12-28

## 2023-12-28 NOTE — CARE COORDINATION
Care Transitions Initial Follow Up Call    Call within 2 business days of discharge: Yes    Patient: Mohinder Tate Patient : 1955   MRN: <[id number]>  Reason for Admission: Abdominal hematoma  Discharge Date: 23 RARS: Readmission Risk Score: 10.8      Last Discharge Facility       Date Complaint Diagnosis Description Type Department Provider    23  Abdominal hematoma Admission (Discharged) [facility] GENOVEVA Hill MD    23 Abdominal Pain Abdominal hematoma . .. ED (TRANSFER) [facility]  ED Jannette Payton MD          Attempted to contact patient today 23 for initial GENE/hospital discharge follow up. Left message on home/mobile number requesting a return call back to CTN and provided contact information.      Maureen Prakash, APRN

## 2023-12-29 ENCOUNTER — CARE COORDINATION (OUTPATIENT)
Dept: CARE COORDINATION | Age: 68
End: 2023-12-29

## 2023-12-29 DIAGNOSIS — S30.1XXA ABDOMINAL HEMATOMA: Primary | ICD-10-CM

## 2023-12-29 PROCEDURE — 1111F DSCHRG MED/CURRENT MED MERGE: CPT | Performed by: FAMILY MEDICINE

## 2023-12-29 RX ORDER — ACETAMINOPHEN 500 MG
1000 TABLET ORAL PRN
COMMUNITY

## 2023-12-29 NOTE — CARE COORDINATION
Care Transitions Initial Follow Up Call    Call within 2 business days of discharge: Yes    Patient Current Location:  Home: [address] [place] [address]    Care Transition Nurse contacted the patient by telephone to perform post hospital discharge assessment. Verified name and  with patient as identifiers. Provided introduction to self, and explanation of the Care Transition Nurse role. Patient: Liseth Butler Patient : 1955   MRN: [id number]  Reason for Admission: Abdominal Hematoma  Discharge Date: 23 RARS: Readmission Risk Score: 10.8      Last Discharge Facility       Date Complaint Diagnosis Description Type Department Provider    23  Abdominal hematoma Admission (Discharged) [address] Chad Shetty MD    23 Abdominal Pain Abdominal hematoma . .. ED (TRANSFER) [facility]  ED Jennifer Sheppard MD          Was this an external facility discharge? No Discharge Facility: [address] [place] [address] to be reviewed by the provider   Additional needs identified to be addressed with provider: No  none               Method of communication with provider: none. Spoke with patient today 23 for initial GENE/hospital discharge (low risk) follow up. Patient states she is feeling better since discharge but has ongoing abdomin al pain from hematoma on right side which she rates 3-4/10 in severity on pain scale. Patient states she is getting pain relief from taking ES Tylenol, Lidocain patch and Tramadol. Noted CT of abdomen/pelvis was obtained on admission noted to show the following below:     IMPRESSION:  1. Large amount of ill-defined soft tissue density within the right rectus  abdominus muscle. This is highly suspicious for a hematoma. 2. Small portacaval and otilia hepatis lymph nodes are probably reactive. 3. Left renal cortical cysts. 4. Probable subcentimeter cyst in the left ovary.     Patient states she was advised that hematoma may have resulted from some of her

## 2023-12-30 LAB
BACTERIA BLD CULT ORG #2: NORMAL
BACTERIA BLD CULT: NORMAL

## 2024-01-03 ENCOUNTER — OFFICE VISIT (OUTPATIENT)
Dept: FAMILY MEDICINE CLINIC | Age: 69
End: 2024-01-03

## 2024-01-03 ENCOUNTER — HOSPITAL ENCOUNTER (OUTPATIENT)
Age: 69
Setting detail: SPECIMEN
Discharge: HOME | End: 2024-01-03
Payer: MEDICARE

## 2024-01-03 VITALS
HEIGHT: 63 IN | HEART RATE: 122 BPM | SYSTOLIC BLOOD PRESSURE: 122 MMHG | WEIGHT: 195 LBS | OXYGEN SATURATION: 91 % | BODY MASS INDEX: 34.55 KG/M2 | DIASTOLIC BLOOD PRESSURE: 78 MMHG

## 2024-01-03 DIAGNOSIS — J43.9 PULMONARY EMPHYSEMA, UNSPECIFIED EMPHYSEMA TYPE (HCC): ICD-10-CM

## 2024-01-03 DIAGNOSIS — R11.2 NAUSEA AND VOMITING, UNSPECIFIED VOMITING TYPE: ICD-10-CM

## 2024-01-03 DIAGNOSIS — M79.81 NONTRAUMATIC RECTUS HEMATOMA: ICD-10-CM

## 2024-01-03 DIAGNOSIS — D72.829 LEUKOCYTOSIS, UNSPECIFIED TYPE: ICD-10-CM

## 2024-01-03 DIAGNOSIS — E11.9 DIABETES MELLITUS TYPE 2 WITHOUT RETINOPATHY (HCC): ICD-10-CM

## 2024-01-03 DIAGNOSIS — Z09 HOSPITAL DISCHARGE FOLLOW-UP: Primary | ICD-10-CM

## 2024-01-03 DIAGNOSIS — I70.0 CALCIFICATION OF ABDOMINAL AORTA (HCC): ICD-10-CM

## 2024-01-03 LAB
ERYTHROCYTE [DISTWIDTH] IN BLOOD BY AUTOMATED COUNT: 17.9 % (ref 11.5–14.5)
HCT VFR BLD AUTO: 42.3 % (ref 37–47)
HGB BLD-MCNC: 13.1 G/DL (ref 12–16)
MCH RBC QN AUTO: 29 PG (ref 27–31.3)
MCHC RBC AUTO-ENTMCNC: 31 % (ref 33–37)
MCV RBC AUTO: 93.6 FL (ref 79.4–94.8)
PLATELET # BLD AUTO: 321 K/UL (ref 130–400)
RBC # BLD AUTO: 4.52 M/UL (ref 4.2–5.4)
WBC # BLD AUTO: 15.3 K/UL (ref 4.8–10.8)

## 2024-01-03 PROCEDURE — 85027 COMPLETE CBC AUTOMATED: CPT

## 2024-01-03 PROCEDURE — 87086 URINE CULTURE/COLONY COUNT: CPT

## 2024-01-03 ASSESSMENT — ENCOUNTER SYMPTOMS
WHEEZING: 0
BACK PAIN: 1
ABDOMINAL PAIN: 1
BLOOD IN STOOL: 0
RHINORRHEA: 0
EYE REDNESS: 0
NAUSEA: 1
SINUS PRESSURE: 0
TROUBLE SWALLOWING: 0
COUGH: 1
SINUS PAIN: 0
SORE THROAT: 0
DIARRHEA: 0
SHORTNESS OF BREATH: 0
VOMITING: 1

## 2024-01-03 ASSESSMENT — PATIENT HEALTH QUESTIONNAIRE - PHQ9
5. POOR APPETITE OR OVEREATING: 0
4. FEELING TIRED OR HAVING LITTLE ENERGY: 0
SUM OF ALL RESPONSES TO PHQ QUESTIONS 1-9: 0
8. MOVING OR SPEAKING SO SLOWLY THAT OTHER PEOPLE COULD HAVE NOTICED. OR THE OPPOSITE, BEING SO FIGETY OR RESTLESS THAT YOU HAVE BEEN MOVING AROUND A LOT MORE THAN USUAL: 0
6. FEELING BAD ABOUT YOURSELF - OR THAT YOU ARE A FAILURE OR HAVE LET YOURSELF OR YOUR FAMILY DOWN: 0
10. IF YOU CHECKED OFF ANY PROBLEMS, HOW DIFFICULT HAVE THESE PROBLEMS MADE IT FOR YOU TO DO YOUR WORK, TAKE CARE OF THINGS AT HOME, OR GET ALONG WITH OTHER PEOPLE: 0
9. THOUGHTS THAT YOU WOULD BE BETTER OFF DEAD, OR OF HURTING YOURSELF: 0
7. TROUBLE CONCENTRATING ON THINGS, SUCH AS READING THE NEWSPAPER OR WATCHING TELEVISION: 0
1. LITTLE INTEREST OR PLEASURE IN DOING THINGS: 0
SUM OF ALL RESPONSES TO PHQ9 QUESTIONS 1 & 2: 0
2. FEELING DOWN, DEPRESSED OR HOPELESS: 0
3. TROUBLE FALLING OR STAYING ASLEEP: 0
SUM OF ALL RESPONSES TO PHQ QUESTIONS 1-9: 0

## 2024-01-03 NOTE — PROGRESS NOTES
[facility] PRIMARY [facility]ARE  [facility] [address]  [facility] [address]  Dept: [phone]  Dept Fax: [phone]  Loc: [phone]     Chief Complaint  Chief Complaint   Patient presents with    Follow-Up from Hospital     [facility] 12/25-12-27. Abdominal hematoma     Discuss Medications     Hospital stopped her on meds because they thought it was due to this.        HPI:  68 y.o.female who presents for the following: Hospital follow up    Admitted 12/25  Discharged 12/27  Rectus Hematoma-on oral plavix-Was held  Abd binder given   No indication for surgery per surgeon   UA- small leuks, pos for nitrites -culture was never sent   Blood cultures negative   WBC admit 25.2 then down to 14.3 12/27   Was on Plavix   TIA in 2016 fell on floor and couldn't move -after 4 hours got strength back-she did not go to the hospital, really sounds unclear wether she had one or not    Hgb 14.5 to 11.9 during hospital stay  Sees cardiology   Carotid ultrasounds- mild plaque   At some point had talked to Dr. Flynn about getting off the Plavix and he agreed but that she would go on ASA, she's allergic to ASA, so she stayed on Plavix  She never saw neurology   Celebrex -was on this for her back       Abd even bruised from the binder   She was on celebrex and mobic for some time up until a few months ago  Pain mamagment took her off mobic     She has been Queasy to her stomach for months now  At times dry heaves- betty had them  Vomits not that often maybe once every couple of weeks, sometimes food and sometimes liquid   Eating okay   No stomach pain   A couple of months off and on   Really bad right now   She has acid reflux- on omeprazole   Taking pepto prn   Taking tramdol prn for her back     She wearing the abd binder, she says it does feel better to wear it when she coughs   It may be too tight and causing bruising   Discussed technique of insulin injections

## 2024-01-04 ENCOUNTER — PREP FOR PROCEDURE (OUTPATIENT)
Dept: GASTROENTEROLOGY | Age: 69
End: 2024-01-04

## 2024-01-04 ENCOUNTER — OFFICE VISIT (OUTPATIENT)
Dept: GASTROENTEROLOGY | Age: 69
End: 2024-01-04
Payer: MEDICARE

## 2024-01-04 VITALS
HEART RATE: 93 BPM | DIASTOLIC BLOOD PRESSURE: 74 MMHG | BODY MASS INDEX: 34.73 KG/M2 | SYSTOLIC BLOOD PRESSURE: 140 MMHG | HEIGHT: 63 IN | OXYGEN SATURATION: 94 % | WEIGHT: 196 LBS

## 2024-01-04 DIAGNOSIS — R10.13 DYSPEPSIA: Primary | ICD-10-CM

## 2024-01-04 DIAGNOSIS — Z86.010 HISTORY OF COLON POLYPS: ICD-10-CM

## 2024-01-04 DIAGNOSIS — R10.13 DYSPEPSIA: ICD-10-CM

## 2024-01-04 PROCEDURE — 1123F ACP DISCUSS/DSCN MKR DOCD: CPT | Performed by: SPECIALIST

## 2024-01-04 PROCEDURE — 3078F DIAST BP <80 MM HG: CPT | Performed by: SPECIALIST

## 2024-01-04 PROCEDURE — 99203 OFFICE O/P NEW LOW 30 MIN: CPT | Performed by: SPECIALIST

## 2024-01-04 PROCEDURE — 3077F SYST BP >= 140 MM HG: CPT | Performed by: SPECIALIST

## 2024-01-04 RX ORDER — SODIUM, POTASSIUM,MAG SULFATES 17.5-3.13G
SOLUTION, RECONSTITUTED, ORAL ORAL
Qty: 354 ML | Refills: 0 | Status: SHIPPED | OUTPATIENT
Start: 2024-01-04

## 2024-01-04 ASSESSMENT — ENCOUNTER SYMPTOMS
ABDOMINAL DISTENTION: 0
RESPIRATORY NEGATIVE: 1
RECTAL PAIN: 0
VOMITING: 1
ANAL BLEEDING: 0
ABDOMINAL PAIN: 1
CONSTIPATION: 0
DIARRHEA: 0
NAUSEA: 1
EYES NEGATIVE: 1
BLOOD IN STOOL: 0

## 2024-01-04 NOTE — PROGRESS NOTES
Gastroenterology Clinic Visit    Tracy Kenyon  [id number]  Chief Complaint   Patient presents with    New Patient     Colon scheduled 2-3-2024  Nausea and vomiting.        HPI: 68 y.o. female presents to the clinic with history of abdominal discomfort associate with nausea and had few episodes of emesis and had seen ingested food particle in the vomitus.  Symptoms started about few months ago.  Symptoms are more frequent now, , has been on omeprazole for GERD, no history of dysphagia, patient has been on Aleve which she discontinued on Buford Day when she went to the hospital.  Patient was seen in the emergency room on Dayna Day because of abdominal pain and was diagnosed to have abdominal wall hematoma.  She was on Plavix and was injecting insulin into the abdominal wall.  No change in bowel habits, no history of GI bleeding.  No history of weight loss.  Social history smokes about 1 pack of cigarettes a day does not drink alcohol.  Surgical history includes exploratory laparotomy several years ago and also had elbow and back surgery.    Review of Systems   Constitutional: Negative.    HENT: Negative.     Eyes: Negative.    Respiratory: Negative.     Cardiovascular: Negative.         History of hypertension, history of POTS .   Gastrointestinal:  Positive for abdominal pain, nausea and vomiting. Negative for abdominal distention, anal bleeding, blood in stool, constipation, diarrhea and rectal pain.   Endocrine: Negative.         Diabetes type 2   Genitourinary: Negative.    Musculoskeletal: Negative.    Skin: Negative.    Neurological: Negative.         History of TIA in the past   Hematological: Negative.    Psychiatric/Behavioral: Negative.          Past Medical History:   Diagnosis Date    Arthritis     Cerebral artery occlusion with cerebral infarction (HCC) 2016 June    TIA / sx left sided weakness & fell & unable to get up off floor for several hours    COPD (chronic obstructive pulmonary disease)

## 2024-01-06 LAB — BACTERIA UR CULT: NORMAL

## 2024-01-09 ENCOUNTER — HOSPITAL ENCOUNTER (OUTPATIENT)
Age: 69
Setting detail: SPECIMEN
Discharge: HOME | End: 2024-01-09
Payer: MEDICARE

## 2024-01-09 DIAGNOSIS — R39.9 LOWER URINARY TRACT SYMPTOMS (LUTS): ICD-10-CM

## 2024-01-09 DIAGNOSIS — E11.9 DIABETES MELLITUS TYPE 2 WITHOUT RETINOPATHY (HCC): ICD-10-CM

## 2024-01-09 DIAGNOSIS — R39.9 LOWER URINARY TRACT SYMPTOMS (LUTS): Primary | ICD-10-CM

## 2024-01-09 PROCEDURE — 87086 URINE CULTURE/COLONY COUNT: CPT

## 2024-01-09 PROCEDURE — 87186 SC STD MICRODIL/AGAR DIL: CPT

## 2024-01-09 PROCEDURE — 87088 URINE BACTERIA CULTURE: CPT

## 2024-01-09 RX ORDER — SODIUM CHLORIDE 0.9 % (FLUSH) 0.9 %
5-40 SYRINGE (ML) INJECTION PRN
Status: CANCELLED | OUTPATIENT
Start: 2024-01-09

## 2024-01-09 RX ORDER — SODIUM CHLORIDE 0.9 % (FLUSH) 0.9 %
5-40 SYRINGE (ML) INJECTION EVERY 12 HOURS SCHEDULED
Status: CANCELLED | OUTPATIENT
Start: 2024-01-09

## 2024-01-09 RX ORDER — SODIUM CHLORIDE 9 MG/ML
INJECTION, SOLUTION INTRAVENOUS PRN
Status: CANCELLED | OUTPATIENT
Start: 2024-01-09

## 2024-01-09 RX ORDER — SODIUM CHLORIDE 9 MG/ML
INJECTION, SOLUTION INTRAVENOUS CONTINUOUS
Status: CANCELLED | OUTPATIENT
Start: 2024-01-09

## 2024-01-10 ENCOUNTER — CARE COORDINATION (OUTPATIENT)
Dept: CARE COORDINATION | Age: 69
End: 2024-01-10

## 2024-01-10 RX ORDER — FLURBIPROFEN SODIUM 0.3 MG/ML
SOLUTION/ DROPS OPHTHALMIC
Qty: 160 EACH | Refills: 0 | Status: SHIPPED | OUTPATIENT
Start: 2024-01-10

## 2024-01-10 NOTE — TELEPHONE ENCOUNTER
Comments:     Last Office Visit (last PCP visit):   12/5/2023    Next Visit Date:  Future Appointments   Date Time Provider Department Center   1/25/2024  1:00 PM Hi Freeman MD [facility]   3/13/2024  1:30 PM Suzette Hi MD [facility]   5/8/2024 12:15 PM Jonas Flynn MD [facility]   6/4/2024  8:00 AM Johnine Livingston MD [place]       **If hasn't been seen in over a year OR hasn't followed up according to last diabetes/ADHD visit, make appointment for patient before sending refill to provider.    Rx requested:  Requested Prescriptions     Pending Prescriptions Disp Refills    ULTICARE MINI PEN NEEDLES 31G X 6 MM MISC [Pharmacy Med Name: [id number]] 160 each 0     Sig: USE 2 PEN NEEDLES DAILY AS  DIRECTED

## 2024-01-10 NOTE — CARE COORDINATION
Care Transitions Follow Up Call    Patient: Tracy Kenyon  Patient : 1955   MRN: [id number]  Reason for Admission: right abdominal hematoma  Discharge Date: 23 RARS: Readmission Risk Score: 10.8    Attempted to contact patient today 1/10/24 for final GENE/hospital discharge (low risk) follow up. Left message on home/mobile number requesting a return call back to CTN and provided contact information.     Noted patient completed f/u with Dr. Hi (GI) on 24 and is scheduled for a colonoscopy tomorrow.  Also, noted patient completed HFU appt with PCP at [facility] on 1/3/24. CTN signing off if no return call.    Ninfa Mcnamara, APRN

## 2024-01-11 ENCOUNTER — ANESTHESIA EVENT (OUTPATIENT)
Dept: ENDOSCOPY | Age: 69
End: 2024-01-11
Payer: MEDICARE

## 2024-01-11 ENCOUNTER — HOSPITAL ENCOUNTER (OUTPATIENT)
Age: 69
Setting detail: OUTPATIENT SURGERY
Discharge: HOME | End: 2024-01-11
Attending: SPECIALIST | Admitting: SPECIALIST
Payer: MEDICARE

## 2024-01-11 ENCOUNTER — ANESTHESIA (OUTPATIENT)
Dept: ENDOSCOPY | Age: 69
End: 2024-01-11
Payer: MEDICARE

## 2024-01-11 VITALS
BODY MASS INDEX: 34.55 KG/M2 | DIASTOLIC BLOOD PRESSURE: 64 MMHG | SYSTOLIC BLOOD PRESSURE: 117 MMHG | HEART RATE: 90 BPM | TEMPERATURE: 97.9 F | RESPIRATION RATE: 20 BRPM | HEIGHT: 63 IN | OXYGEN SATURATION: 93 % | WEIGHT: 195 LBS

## 2024-01-11 DIAGNOSIS — Z86.010 HISTORY OF COLON POLYPS: ICD-10-CM

## 2024-01-11 DIAGNOSIS — R10.13 DYSPEPSIA: ICD-10-CM

## 2024-01-11 PROBLEM — K29.70 GASTRITIS: Status: ACTIVE | Noted: 2024-01-11

## 2024-01-11 PROBLEM — K63.5 HYPERPLASTIC POLYP OF SIGMOID COLON: Status: ACTIVE | Noted: 2024-01-11

## 2024-01-11 LAB
GLUCOSE BLD-MCNC: 132 MG/DL (ref 70–99)
PERFORMED ON: ABNORMAL

## 2024-01-11 PROCEDURE — 7100000010 HC PHASE II RECOVERY - FIRST 15 MIN: Performed by: SPECIALIST

## 2024-01-11 PROCEDURE — 3609027000 HC COLONOSCOPY: Performed by: SPECIALIST

## 2024-01-11 PROCEDURE — 2709999900 HC NON-CHARGEABLE SUPPLY: Performed by: SPECIALIST

## 2024-01-11 PROCEDURE — 3609017100 HC EGD: Performed by: SPECIALIST

## 2024-01-11 PROCEDURE — 88305 TISSUE EXAM BY PATHOLOGIST: CPT

## 2024-01-11 PROCEDURE — 43239 EGD BIOPSY SINGLE/MULTIPLE: CPT | Performed by: SPECIALIST

## 2024-01-11 PROCEDURE — 2580000003 HC RX 258: Performed by: SPECIALIST

## 2024-01-11 PROCEDURE — 45385 COLONOSCOPY W/LESION REMOVAL: CPT | Performed by: SPECIALIST

## 2024-01-11 PROCEDURE — 3700000001 HC ADD 15 MINUTES (ANESTHESIA): Performed by: SPECIALIST

## 2024-01-11 PROCEDURE — 2500000003 HC RX 250 WO HCPCS: Performed by: NURSE ANESTHETIST, CERTIFIED REGISTERED

## 2024-01-11 PROCEDURE — 6360000002 HC RX W HCPCS: Performed by: NURSE ANESTHETIST, CERTIFIED REGISTERED

## 2024-01-11 PROCEDURE — 3700000000 HC ANESTHESIA ATTENDED CARE: Performed by: SPECIALIST

## 2024-01-11 PROCEDURE — 88342 IMHCHEM/IMCYTCHM 1ST ANTB: CPT

## 2024-01-11 RX ORDER — SODIUM CHLORIDE 9 MG/ML
INJECTION, SOLUTION INTRAVENOUS PRN
Status: DISCONTINUED | OUTPATIENT
Start: 2024-01-11 | End: 2024-01-11 | Stop reason: HOSPADM

## 2024-01-11 RX ORDER — SODIUM CHLORIDE 0.9 % (FLUSH) 0.9 %
5-40 SYRINGE (ML) INJECTION PRN
Status: DISCONTINUED | OUTPATIENT
Start: 2024-01-11 | End: 2024-01-11 | Stop reason: HOSPADM

## 2024-01-11 RX ORDER — PROPOFOL 10 MG/ML
INJECTION, EMULSION INTRAVENOUS PRN
Status: DISCONTINUED | OUTPATIENT
Start: 2024-01-11 | End: 2024-01-11 | Stop reason: SDUPTHER

## 2024-01-11 RX ORDER — TRAMADOL HYDROCHLORIDE 50 MG/1
50 TABLET ORAL EVERY 6 HOURS PRN
COMMUNITY

## 2024-01-11 RX ORDER — SODIUM CHLORIDE 0.9 % (FLUSH) 0.9 %
5-40 SYRINGE (ML) INJECTION EVERY 12 HOURS SCHEDULED
Status: DISCONTINUED | OUTPATIENT
Start: 2024-01-11 | End: 2024-01-11 | Stop reason: HOSPADM

## 2024-01-11 RX ORDER — MAGNESIUM HYDROXIDE 1200 MG/15ML
LIQUID ORAL PRN
Status: DISCONTINUED | OUTPATIENT
Start: 2024-01-11 | End: 2024-01-11 | Stop reason: ALTCHOICE

## 2024-01-11 RX ORDER — SODIUM CHLORIDE 9 MG/ML
INJECTION, SOLUTION INTRAVENOUS CONTINUOUS
Status: DISCONTINUED | OUTPATIENT
Start: 2024-01-11 | End: 2024-01-11 | Stop reason: HOSPADM

## 2024-01-11 RX ORDER — LIDOCAINE HYDROCHLORIDE 20 MG/ML
INJECTION, SOLUTION INFILTRATION; PERINEURAL PRN
Status: DISCONTINUED | OUTPATIENT
Start: 2024-01-11 | End: 2024-01-11 | Stop reason: SDUPTHER

## 2024-01-11 RX ORDER — GLYCOPYRROLATE 1 MG/5 ML
SYRINGE (ML) INTRAVENOUS PRN
Status: DISCONTINUED | OUTPATIENT
Start: 2024-01-11 | End: 2024-01-11 | Stop reason: SDUPTHER

## 2024-01-11 RX ADMIN — PHENYLEPHRINE HYDROCHLORIDE 100 MCG: 10 INJECTION INTRAVENOUS at 14:38

## 2024-01-11 RX ADMIN — SODIUM CHLORIDE: 9 INJECTION, SOLUTION INTRAVENOUS at 13:12

## 2024-01-11 RX ADMIN — PHENYLEPHRINE HYDROCHLORIDE 100 MCG: 10 INJECTION INTRAVENOUS at 14:22

## 2024-01-11 RX ADMIN — PHENYLEPHRINE HYDROCHLORIDE 100 MCG: 10 INJECTION INTRAVENOUS at 14:20

## 2024-01-11 RX ADMIN — PHENYLEPHRINE HYDROCHLORIDE 100 MCG: 10 INJECTION INTRAVENOUS at 14:27

## 2024-01-11 RX ADMIN — LIDOCAINE HYDROCHLORIDE 40 MG: 20 INJECTION, SOLUTION INFILTRATION; PERINEURAL at 14:13

## 2024-01-11 RX ADMIN — Medication 0.2 MG: at 14:25

## 2024-01-11 RX ADMIN — PROPOFOL 460 MG: 10 INJECTION, EMULSION INTRAVENOUS at 14:14

## 2024-01-11 ASSESSMENT — PAIN - FUNCTIONAL ASSESSMENT
PAIN_FUNCTIONAL_ASSESSMENT: NONE - DENIES PAIN
PAIN_FUNCTIONAL_ASSESSMENT: NONE - DENIES PAIN
PAIN_FUNCTIONAL_ASSESSMENT: 0-10

## 2024-01-11 NOTE — ANESTHESIA PRE PROCEDURE
Department of Anesthesiology  Preprocedure Note       Name:  Tracy Kenyon   Age:  68 y.o.  :  1955                                          MRN:  [id number]         Date:  2024      Surgeon: Surgeon(s):  Suzette Hi MD    Procedure: Procedure(s):  COLONOSCOPY DIAGNOSTIC  EGD DIAGNOSTIC ONLY    Medications prior to admission:   Prior to Admission medications    Medication Sig Start Date End Date Taking? Authorizing Provider   traMADol (ULTRAM) 50 MG tablet Take 1 tablet by mouth every 6 hours as needed for Pain. Max Daily Amount: 200 mg   Yes Provider, MD Yessenia   ULTICARE MINI PEN NEEDLES 31G X 6 MM MISC USE 2 PEN NEEDLES DAILY AS  DIRECTED 1/10/24   Johnnie Livingston MD   sodium-potassium-mag sulfate (SUPREP) 17.5-3.13-1.6 GM/177ML SOLN solution As directed 24   Suzette Hi MD   Misc. Devices KIT 1 kit by Does not apply route daily as needed Abdominal binder    ProviderYessenia MD   acetaminophen (TYLENOL) 500 MG tablet Take 2 tablets by mouth as needed for Pain    Provider, MD Yessenia   lidocaine 4 % external patch Place 1 patch onto the skin daily 23   Ayleen Hall MD   metFORMIN (GLUCOPHAGE-XR) 500 MG extended release tablet TAKE 1 TABLET BY MOUTH TWICE  DAILY 23   Johnnie Livingston MD   LANTUS SOLOSTAR 100 UNIT/ML injection pen INJECT SUBCUTANEOUSLY 30  UNITS TWICE DAILY  Patient taking differently: 24 Units 2 times daily 23   Johnnie Livingston MD   simvastatin (ZOCOR) 40 MG tablet TAKE 1 TABLET BY MOUTH AT  NIGHT 23   Johnnie Livingston MD   omeprazole (PRILOSEC) 40 MG delayed release capsule Take 1 capsule by mouth daily 23   Johnnie Livingston MD   Lancets MISC Use 3x/day 23   Johnnie Livingston MD   lisinopril (PRINIVIL;ZESTRIL) 10 MG tablet TAKE 1 TABLET BY MOUTH AT  NIGHT 3/20/23   Johnnie Livingston MD   venlafaxine (EFFEXOR XR) 150 MG extended release capsule TAKE 1 CAPSULE BY MOUTH  DAILY 3/20/23   Johnnie Livingston MD   blood glucose monitor

## 2024-01-11 NOTE — ANESTHESIA POSTPROCEDURE EVALUATION
Department of Anesthesiology  Postprocedure Note    Patient: Tracy Kenyon  MRN: [id number]  YOB: 1955  Date of evaluation: 1/11/2024    Procedure Summary       Date: 01/11/24 Room / Location: [facility] OR 01 / [facility]    Anesthesia Start: 1410 Anesthesia Stop: 1444    Procedures:       COLONOSCOPY DIAGNOSTIC      EGD DIAGNOSTIC ONLY Diagnosis:       History of colon polyps      Dyspepsia      (History of colon polyps [Z86.010])      (Dyspepsia [R10.13])    Surgeons: Suzette Hi MD Responsible Provider: Chris Harmon APRN - CRNA    Anesthesia Type: MAC ASA Status: 3            Anesthesia Type: MAC    Jessie Phase I: Jessie Score: 10    Jessie Phase II:      Anesthesia Post Evaluation    Patient location during evaluation: bedside  Patient participation: complete - patient participated  Level of consciousness: awake and awake and alert  Pain score: 0  Airway patency: patent  Nausea & Vomiting: no nausea and no vomiting  Cardiovascular status: blood pressure returned to baseline and hemodynamically stable  Respiratory status: acceptable and face mask  Hydration status: euvolemic  Pain management: adequate        No notable events documented.

## 2024-01-11 NOTE — H&P
Patient Name: Tracy Kenyon  : 1955  MRN: [id number]  DATE: 24      ENDOSCOPY  History and Physical    Procedure:    [x] Diagnostic Colonoscopy       [] Screening Colonoscopy  [x] EGD      [] ERCP      [] EUS       [] Other    [x] Previous office notes/History and Physical reviewed from the patients chart. Please see EMR for further details of HPI. I have examined the patient's status immediately prior to the procedure and:      Indications/HPI:    []Abdominal Pain  []Cancer- GI/Lung  []Fhx of colon CA/polyps  []History of Polyps  []Morrison’s   []Melena  []Abnormal Imaging  []Dysphagia    []Persistent Pneumonia  []Anemia  []Food Impaction  []History of Polyps  []GI Bleed  []Pulmonary nodule/Mass  []Change in bowel habits []Heartburn/Reflux  []Rectal Bleed (BRBPR)  []Chest Pain - Non Cardiac []Heme (+) Stoo  l[]Ulcers  []Constipation  []Hemoptysis   []Varices  []Diarrhea  []Hypoxemia  []Nausea/Vomiting  []Screening   []Crohns/Colitis  []Other: dyspepsia, h/o colon polyp.    Anesthesia:   [x] MAC [] Moderate Sedation   [] General   [] None     ROS: 12 pt Review of Symptoms was negative unless mentioned above    Medications:   Prior to Admission medications    Medication Sig Start Date End Date Taking? Authorizing Provider   traMADol (ULTRAM) 50 MG tablet Take 1 tablet by mouth every 6 hours as needed for Pain. Max Daily Amount: 200 mg   Yes ProviderYessenia MD   ULTICARE MINI PEN NEEDLES 31G X 6 MM MISC USE 2 PEN NEEDLES DAILY AS  DIRECTED 1/10/24   Johnnie Livingston MD   sodium-potassium-mag sulfate (SUPREP) 17.5-3.13-1.6 GM/177ML SOLN solution As directed 24   Suzette Hi MD   Misc. Devices KIT 1 kit by Does not apply route daily as needed Abdominal binder    ProviderYessenia MD   acetaminophen (TYLENOL) 500 MG tablet Take 2 tablets by mouth as needed for Pain    ProviderYessenia MD   lidocaine 4 % external patch Place 1 patch onto the skin daily 23   Ayleen Hall MD

## 2024-01-12 DIAGNOSIS — N30.00 ACUTE CYSTITIS WITHOUT HEMATURIA: Primary | ICD-10-CM

## 2024-01-12 LAB
BACTERIA UR CULT: ABNORMAL
BACTERIA UR CULT: ABNORMAL
ORGANISM: ABNORMAL

## 2024-01-12 RX ORDER — CIPROFLOXACIN 250 MG/1
250 TABLET, FILM COATED ORAL 2 TIMES DAILY
Qty: 20 TABLET | Refills: 0 | Status: SHIPPED | OUTPATIENT
Start: 2024-01-12 | End: 2024-01-22

## 2024-02-15 ENCOUNTER — TELEPHONE (OUTPATIENT)
Dept: INTERNAL MEDICINE | Age: 69
End: 2024-02-15

## 2024-02-15 DIAGNOSIS — Z78.0 POSTMENOPAUSAL: Primary | ICD-10-CM

## 2024-02-15 DIAGNOSIS — Z87.891 PERSONAL HISTORY OF TOBACCO USE: ICD-10-CM

## 2024-02-15 NOTE — TELEPHONE ENCOUNTER
----- Message from Tiffany Gaston sent at 2/15/2024 10:10 AM EST -----  Subject: Message to Provider    QUESTIONS  Information for Provider? Pt had to cancel her bone density and lung   screening because she was in the hospital. Pt now needs new orders to have   these tests done. Please contact the pt when orders are put in.  ---------------------------------------------------------------------------  --------------  CALL BACK INFO  [phone]; OK to leave message on voicemail  ---------------------------------------------------------------------------  --------------  SCRIPT ANSWERS  Relationship to Patient? Self

## 2024-02-15 NOTE — TELEPHONE ENCOUNTER
Per [facility]: Pt had to cancel her bone density and lung   screening because she was in the hospital. Pt now needs new orders to have   these tests done. Please contact the pt when orders are put in.         Does pt need new orders or are the ones that still show active - future sufficient?

## 2024-02-29 DIAGNOSIS — I10 PRIMARY HYPERTENSION: Primary | ICD-10-CM

## 2024-02-29 RX ORDER — LISINOPRIL 10 MG/1
TABLET ORAL
Qty: 90 TABLET | Refills: 3 | Status: SHIPPED | OUTPATIENT
Start: 2024-02-29

## 2024-03-01 ENCOUNTER — OFFICE VISIT (OUTPATIENT)
Dept: FAMILY MEDICINE CLINIC | Age: 69
End: 2024-03-01

## 2024-03-01 VITALS
SYSTOLIC BLOOD PRESSURE: 136 MMHG | HEART RATE: 90 BPM | DIASTOLIC BLOOD PRESSURE: 80 MMHG | WEIGHT: 193.6 LBS | OXYGEN SATURATION: 95 % | TEMPERATURE: 97.7 F | BODY MASS INDEX: 34.29 KG/M2

## 2024-03-01 DIAGNOSIS — G47.00 INSOMNIA, UNSPECIFIED TYPE: ICD-10-CM

## 2024-03-01 DIAGNOSIS — F43.23 SITUATIONAL MIXED ANXIETY AND DEPRESSIVE DISORDER: Primary | ICD-10-CM

## 2024-03-01 RX ORDER — MIRTAZAPINE 15 MG/1
15 TABLET, FILM COATED ORAL NIGHTLY
Qty: 30 TABLET | Refills: 0 | Status: SHIPPED | OUTPATIENT
Start: 2024-03-01

## 2024-03-01 ASSESSMENT — ENCOUNTER SYMPTOMS
SHORTNESS OF BREATH: 0
RHINORRHEA: 0
VOMITING: 0
TROUBLE SWALLOWING: 0
DIARRHEA: 0
EYE REDNESS: 0
NAUSEA: 0
SORE THROAT: 0
COUGH: 0
WHEEZING: 0
BACK PAIN: 1

## 2024-03-01 NOTE — PROGRESS NOTES
anxiety and depressive disorder  mirtazapine (REMERON) 15 MG tablet      2. Insomnia, unspecified type  mirtazapine (REMERON) 15 MG tablet        No results found for this visit on 03/01/24.   Plan:   Will start Mirtazapine. Let her know she could take the hydroxyzine during the day as needed if having panic.   Assessment & Plan   Tracy was seen today for anxiety.    Diagnoses and all orders for this visit:    Situational mixed anxiety and depressive disorder  -     mirtazapine (REMERON) 15 MG tablet; Take 1 tablet by mouth nightly    Insomnia, unspecified type  -     mirtazapine (REMERON) 15 MG tablet; Take 1 tablet by mouth nightly      No orders of the defined types were placed in this encounter.    Orders Placed This Encounter   Medications    mirtazapine (REMERON) 15 MG tablet     Sig: Take 1 tablet by mouth nightly     Dispense:  30 tablet     Refill:  0     Medications Discontinued During This Encounter   Medication Reason    metFORMIN (GLUCOPHAGE-XR) 500 MG extended release tablet Side effects    sodium-potassium-mag sulfate (SUPREP) 17.5-3.13-1.6 GM/177ML SOLN solution Therapy completed    traMADol (ULTRAM) 50 MG tablet Therapy completed     Return if symptoms worsen or fail to improve.        Reviewed with the patient/family: current clinical status & medications.  Side effects of the medication prescribed today, as well as treatment plan/rationale and result expectations have been discussed with the patient/family who expresses understanding. Patient will be discharged home in stable condition.    Follow up with PCP to evaluate treatment results or return if symptoms worsen or fail to improve. Discussed signs and symptoms which require immediate follow-up in ED/call to 911.  Understanding verbalized.     I have reviewed the patient's medical history in detail and updated the computerized patient record.    Fatou Montoya, APRN - CNP

## 2024-03-06 ENCOUNTER — HOSPITAL ENCOUNTER (OUTPATIENT)
Dept: CT IMAGING | Age: 69
Discharge: HOME | End: 2024-03-08
Payer: MEDICARE

## 2024-03-06 ENCOUNTER — HOSPITAL ENCOUNTER (OUTPATIENT)
Dept: WOMENS IMAGING | Age: 69
Discharge: HOME | End: 2024-03-08
Payer: MEDICARE

## 2024-03-06 DIAGNOSIS — Z78.0 POSTMENOPAUSAL: ICD-10-CM

## 2024-03-06 DIAGNOSIS — Z87.891 PERSONAL HISTORY OF TOBACCO USE: ICD-10-CM

## 2024-03-06 DIAGNOSIS — Z87.891 PERSONAL HISTORY OF TOBACCO USE: Primary | ICD-10-CM

## 2024-03-06 PROBLEM — M85.89 OSTEOPENIA OF MULTIPLE SITES: Status: ACTIVE | Noted: 2024-03-06

## 2024-03-06 PROBLEM — K29.70 GASTRITIS: Status: RESOLVED | Noted: 2024-01-11 | Resolved: 2024-03-06

## 2024-03-06 PROCEDURE — 77080 DXA BONE DENSITY AXIAL: CPT

## 2024-03-06 PROCEDURE — 71271 CT THORAX LUNG CANCER SCR C-: CPT

## 2024-03-11 DIAGNOSIS — F32.89 OTHER DEPRESSION: ICD-10-CM

## 2024-03-11 RX ORDER — VENLAFAXINE HYDROCHLORIDE 150 MG/1
150 CAPSULE, EXTENDED RELEASE ORAL DAILY
Qty: 90 CAPSULE | Refills: 3 | Status: SHIPPED | OUTPATIENT
Start: 2024-03-11

## 2024-03-13 ENCOUNTER — OFFICE VISIT (OUTPATIENT)
Dept: GASTROENTEROLOGY | Age: 69
End: 2024-03-13
Payer: MEDICARE

## 2024-03-13 VITALS — HEIGHT: 63 IN | WEIGHT: 193 LBS | BODY MASS INDEX: 34.2 KG/M2 | OXYGEN SATURATION: 93 % | HEART RATE: 85 BPM

## 2024-03-13 DIAGNOSIS — K20.90 ESOPHAGITIS: ICD-10-CM

## 2024-03-13 DIAGNOSIS — K63.5 HYPERPLASTIC POLYP OF SIGMOID COLON: Primary | ICD-10-CM

## 2024-03-13 PROCEDURE — 1123F ACP DISCUSS/DSCN MKR DOCD: CPT | Performed by: SPECIALIST

## 2024-03-13 PROCEDURE — 99212 OFFICE O/P EST SF 10 MIN: CPT | Performed by: SPECIALIST

## 2024-03-13 RX ORDER — ACETAMINOPHEN AND CODEINE PHOSPHATE 300; 60 MG/1; MG/1
1 TABLET ORAL 2 TIMES DAILY
COMMUNITY
Start: 2024-03-06

## 2024-03-13 ASSESSMENT — ENCOUNTER SYMPTOMS
DIARRHEA: 0
VOMITING: 0
NAUSEA: 0
BLOOD IN STOOL: 0
ABDOMINAL PAIN: 0
RECTAL PAIN: 0
EYES NEGATIVE: 1
ANAL BLEEDING: 0
CONSTIPATION: 0
ABDOMINAL DISTENTION: 0
RESPIRATORY NEGATIVE: 1
GASTROINTESTINAL NEGATIVE: 1

## 2024-03-13 NOTE — PROGRESS NOTES
Smoking Status?->Every Day Smoking packs per day?->1 Years smoking?->49 What reading provider will be dictating this exam?->CRC FINDINGS: Mediastinum: Thyroid is homogeneous in attenuation. No bulky mediastinal adenopathy. Central airways are patent. Esophagus with normal course and caliber.  Cardiac size within normal limits without pericardial effusion. Lungs/pleura: Lungs are clear without focal opacification or consolidation. Questionable mild hyperinflation and mild central cylindrical bronchiectasis. No dominant nodule or mass lesion. No pleural effusion or pleural process. Upper Abdomen: Visualized portions of the upper abdomen unremarkable. Soft Tissues/Bones: No acute osseous or soft tissue findings. No aggressive osseous lesion.     Mild chronic changes without acute pulmonary process or suspicious nodule. LUNG RADS: Lung rads category 1 benign appearance with follow-up recommended CT chest 12 months RECOMMENDATIONS: If you would like to register your patient with the [facility] Lung Nodule/Lung Cancer Screening Program, please contact the Nurse Navigator at [phone].       Endoscopic investigations:     Assessment and Plan:  Tracy Kenyon 69 y.o. female for follow up.  Noscapine showed multiple colon polyps and they were hyperplastic polyp with serrated features, surveillance colonoscopy after 3 years.,  Patient does not have any dyspeptic symptoms anymore, has GERD and is on omeprazole 40 mg once a day.      Return in about 1 year (around 3/13/2025).    Suzette Hi MD   StaffGastroenterologist  [facility]    Please note this report has been partially produced using speech recognitionsoftware  and may cause contain errors related to that system including grammar, punctuation and spelling as well as words andphrases that may seem inappropriate. If there are questions or concerns please feel free to contact me to clarify.

## 2024-03-18 DIAGNOSIS — E11.9 DIABETES MELLITUS TYPE 2 WITHOUT RETINOPATHY (HCC): ICD-10-CM

## 2024-03-18 RX ORDER — INSULIN GLARGINE 100 [IU]/ML
24 INJECTION, SOLUTION SUBCUTANEOUS 2 TIMES DAILY
Qty: 60 ML | Refills: 2 | Status: SHIPPED | OUTPATIENT
Start: 2024-03-18

## 2024-03-25 DIAGNOSIS — F51.04 PSYCHOPHYSIOLOGICAL INSOMNIA: ICD-10-CM

## 2024-03-25 RX ORDER — HYDROXYZINE 50 MG/1
50 TABLET, FILM COATED ORAL NIGHTLY PRN
Qty: 90 TABLET | Refills: 1 | Status: SHIPPED | OUTPATIENT
Start: 2024-03-25 | End: 2024-09-21

## 2024-03-27 ENCOUNTER — OFFICE VISIT (OUTPATIENT)
Dept: OTOLARYNGOLOGY | Facility: CLINIC | Age: 69
End: 2024-03-27
Payer: MEDICARE

## 2024-03-27 DIAGNOSIS — R22.1 NECK MASS: Primary | ICD-10-CM

## 2024-03-27 PROCEDURE — 1160F RVW MEDS BY RX/DR IN RCRD: CPT | Performed by: OTOLARYNGOLOGY

## 2024-03-27 PROCEDURE — 1159F MED LIST DOCD IN RCRD: CPT | Performed by: OTOLARYNGOLOGY

## 2024-03-27 PROCEDURE — 99213 OFFICE O/P EST LOW 20 MIN: CPT | Performed by: OTOLARYNGOLOGY

## 2024-03-27 NOTE — PROGRESS NOTES
Twyla Duran is a 69 y.o. year old female patient with 4 MONTH FU ON NECK MASS     Patient returns to the office for 4-month follow-up on left neck mass.  Patient with prior left neck mass and after biopsy spontaneously resolved.  Patient denies any new concerns.  All other ENT issues are negative.          Physical Exam:   General appearance: No acute distress. Normal facies. Symmetric facial movement. No gross lesions of the face are noted.  The external ear structures appear normal. The ear canals patent and the tympanic membranes are intact without evidence of air-fluid levels, retraction, or congenital defects.  Anterior rhinoscopy notes essentially a midline nasal septum. Examination is noted for normal healthy mucosal membranes without any evidence of lesions, polyps, or exudate. The tongue is normally mobile. There are no lesions on the gingiva, buccal, or oral mucosa. There are no oral cavity masses.  The neck is negative for mass lymphadenopathy. The trachea and parotid are clear. The thyroid bed is grossly unremarkable. The salivary gland structures are grossly unremarkable.    Assessment/Plan     1.  Neck mass  Patient with history of left neck mass.  There is no worrisome findings on exam today.  At this time recommendation for the patient is observation and will see her back in 4 months or sooner should a change arise.

## 2024-05-02 DIAGNOSIS — K21.9 GASTROESOPHAGEAL REFLUX DISEASE, UNSPECIFIED WHETHER ESOPHAGITIS PRESENT: ICD-10-CM

## 2024-05-02 RX ORDER — OMEPRAZOLE 40 MG/1
40 CAPSULE, DELAYED RELEASE ORAL DAILY
Qty: 90 CAPSULE | Refills: 3 | Status: SHIPPED | OUTPATIENT
Start: 2024-05-02

## 2024-05-02 NOTE — TELEPHONE ENCOUNTER
Comments:     Last Office Visit (last PCP visit):   12/5/2023    Next Visit Date:  Future Appointments   Date Time Provider Department Center   5/8/2024 12:15 PM Jonas Flynn MD [facility]   6/4/2024  8:00 AM Johnnie Livingston MD [facility]       **If hasn't been seen in over a year OR hasn't followed up according to last diabetes/ADHD visit, make appointment for patient before sending refill to provider.    Rx requested:  Requested Prescriptions     Pending Prescriptions Disp Refills    omeprazole (PRILOSEC) 40 MG delayed release capsule [Pharmacy Med Name: Omeprazole 40 MG Oral Capsule Delayed Release] 90 capsule 3     Sig: TAKE 1 CAPSULE BY MOUTH  DAILY

## 2024-05-08 ENCOUNTER — OFFICE VISIT (OUTPATIENT)
Dept: CARDIOLOGY CLINIC | Age: 69
End: 2024-05-08
Payer: MEDICARE

## 2024-05-08 VITALS
RESPIRATION RATE: 14 BRPM | DIASTOLIC BLOOD PRESSURE: 64 MMHG | HEART RATE: 78 BPM | OXYGEN SATURATION: 91 % | WEIGHT: 195 LBS | BODY MASS INDEX: 34.55 KG/M2 | HEIGHT: 63 IN | SYSTOLIC BLOOD PRESSURE: 112 MMHG | TEMPERATURE: 98.7 F

## 2024-05-08 DIAGNOSIS — R09.89 BILATERAL CAROTID BRUITS: ICD-10-CM

## 2024-05-08 DIAGNOSIS — E11.9 DIABETES MELLITUS TYPE 2 WITHOUT RETINOPATHY (HCC): ICD-10-CM

## 2024-05-08 DIAGNOSIS — G90.A POTS (POSTURAL ORTHOSTATIC TACHYCARDIA SYNDROME): Primary | ICD-10-CM

## 2024-05-08 DIAGNOSIS — F17.200 SMOKER: ICD-10-CM

## 2024-05-08 DIAGNOSIS — E78.5 DYSLIPIDEMIA: ICD-10-CM

## 2024-05-08 DIAGNOSIS — G45.9 TIA (TRANSIENT ISCHEMIC ATTACK): ICD-10-CM

## 2024-05-08 PROCEDURE — 99214 OFFICE O/P EST MOD 30 MIN: CPT | Performed by: INTERNAL MEDICINE

## 2024-05-08 PROCEDURE — 3078F DIAST BP <80 MM HG: CPT | Performed by: INTERNAL MEDICINE

## 2024-05-08 PROCEDURE — 3074F SYST BP LT 130 MM HG: CPT | Performed by: INTERNAL MEDICINE

## 2024-05-08 PROCEDURE — 1123F ACP DISCUSS/DSCN MKR DOCD: CPT | Performed by: INTERNAL MEDICINE

## 2024-05-08 PROCEDURE — 93000 ELECTROCARDIOGRAM COMPLETE: CPT | Performed by: INTERNAL MEDICINE

## 2024-05-08 RX ORDER — CLOPIDOGREL BISULFATE 75 MG/1
75 TABLET ORAL DAILY
Qty: 30 TABLET | Refills: 3
Start: 2024-05-08

## 2024-05-08 RX ORDER — FLURBIPROFEN SODIUM 0.3 MG/ML
SOLUTION/ DROPS OPHTHALMIC
Qty: 160 EACH | Refills: 0 | Status: SHIPPED | OUTPATIENT
Start: 2024-05-08

## 2024-05-08 ASSESSMENT — ENCOUNTER SYMPTOMS
BLOOD IN STOOL: 0
COUGH: 0
SHORTNESS OF BREATH: 0
WHEEZING: 0
GASTROINTESTINAL NEGATIVE: 1
STRIDOR: 0
NAUSEA: 0
CHEST TIGHTNESS: 0
RESPIRATORY NEGATIVE: 1
EYES NEGATIVE: 1

## 2024-05-08 NOTE — PROGRESS NOTES
\"TSH\"    Patient Active Problem List   Diagnosis    COPD (chronic obstructive pulmonary disease) (HCC)    Diabetes mellitus type 2 without retinopathy (HCC)    HTN (hypertension)    Neuropathy, lower extremity    Depression    POTS (postural orthostatic tachycardia syndrome)    Smoker    Post laminectomy syndrome    TIA (transient ischemic attack)    Calcification of abdominal aorta (HCC)    Mass of left side of neck    Gastroesophageal reflux disease    History of colon polyps    Abdominal hematoma    Dyspepsia    Hyperplastic polyp of sigmoid colon    Osteopenia of multiple sites       There are no discontinued medications.    Modified Medications    No medications on file       Orders Placed This Encounter   Medications    clopidogrel (PLAVIX) 75 MG tablet     Sig: Take 1 tablet by mouth daily     Dispense:  30 tablet     Refill:  3       Assessment/Plan:    1. POTS (postural orthostatic tachycardia syndrome)   stable no palps nor dizziness.     2. Essential hypertension   stable continue meds. Low salt diet     3. TIA (transient ischemic attack) - on Plavix.      - US CAROTID ARTERY BILATERAL - mild     4. HORN (dyspnea on exertion)  spect small apical scar. - no CP. No SOB  Stop smoking    5. Severe obesity (BMI 35.0-39.9) with comorbidity (HCC)   loose weight     6. Bilateral carotid bruits     - US CAROTID ARTERY BILATERAL- mild - f/u US     7. Back pain - stable.     Counseling:  Heart Healthy Lifestyle, Improve BMI, Stop Smoking, Low Salt Diet, Take Precautions to Prevent Falls and Walk Daily    No follow-ups on file.      Electronically signed by KRISTI JAUREGUI MD on 5/8/2024 at 12:37 PM

## 2024-05-08 NOTE — TELEPHONE ENCOUNTER
Comments:     Last Office Visit (last PCP visit):   12/5/2023    Next Visit Date:  Future Appointments   Date Time Provider Department Center   6/4/2024  8:00 AM Johnnie Livingston MD [facility]   11/11/2024  1:45 PM Jonas Flynn MD [facility]       **If hasn't been seen in over a year OR hasn't followed up according to last diabetes/ADHD visit, make appointment for patient before sending refill to provider.    Rx requested:  Requested Prescriptions     Pending Prescriptions Disp Refills    ULTICARE MINI PEN NEEDLES 31G X 6 MM MISC [Pharmacy Med Name: [id number]] 160 each 0     Sig: USE 2 PEN NEEDLES DAILY AS  DIRECTED

## 2024-05-14 DIAGNOSIS — E11.9 DIABETES MELLITUS TYPE 2 WITHOUT RETINOPATHY (HCC): ICD-10-CM

## 2024-05-14 RX ORDER — FLURBIPROFEN SODIUM 0.3 MG/ML
SOLUTION/ DROPS OPHTHALMIC
Qty: 160 EACH | Refills: 0 | Status: SHIPPED | OUTPATIENT
Start: 2024-05-14

## 2024-05-14 NOTE — TELEPHONE ENCOUNTER
Comments:     Last Office Visit (last PCP visit):   12/5/2023    Next Visit Date:  Future Appointments   Date Time Provider Department Center   6/4/2024  8:00 AM Johnnie Livingston MD [facility]   11/11/2024  1:45 PM Jonas Flynn MD [facility]       **If hasn't been seen in over a year OR hasn't followed up according to last diabetes/ADHD visit, make appointment for patient before sending refill to provider.    Rx requested:  Requested Prescriptions     Pending Prescriptions Disp Refills    Insulin Pen Needle (ULTICARE MINI PEN NEEDLES) 31G X 6 MM MISC 160 each 0

## 2024-06-04 ENCOUNTER — OFFICE VISIT (OUTPATIENT)
Dept: FAMILY MEDICINE CLINIC | Age: 69
End: 2024-06-04
Payer: MEDICARE

## 2024-06-04 VITALS
HEART RATE: 85 BPM | WEIGHT: 190.6 LBS | BODY MASS INDEX: 33.77 KG/M2 | HEIGHT: 63 IN | DIASTOLIC BLOOD PRESSURE: 72 MMHG | SYSTOLIC BLOOD PRESSURE: 138 MMHG | OXYGEN SATURATION: 95 %

## 2024-06-04 DIAGNOSIS — K21.9 GASTROESOPHAGEAL REFLUX DISEASE, UNSPECIFIED WHETHER ESOPHAGITIS PRESENT: ICD-10-CM

## 2024-06-04 DIAGNOSIS — F51.04 PSYCHOPHYSIOLOGICAL INSOMNIA: ICD-10-CM

## 2024-06-04 DIAGNOSIS — E11.9 TYPE 2 DIABETES MELLITUS WITHOUT COMPLICATION, WITH LONG-TERM CURRENT USE OF INSULIN (HCC): Primary | ICD-10-CM

## 2024-06-04 DIAGNOSIS — Z79.4 TYPE 2 DIABETES MELLITUS WITHOUT COMPLICATION, WITH LONG-TERM CURRENT USE OF INSULIN (HCC): Primary | ICD-10-CM

## 2024-06-04 LAB — HBA1C MFR BLD: 7 %

## 2024-06-04 PROCEDURE — 1123F ACP DISCUSS/DSCN MKR DOCD: CPT | Performed by: FAMILY MEDICINE

## 2024-06-04 PROCEDURE — 3075F SYST BP GE 130 - 139MM HG: CPT | Performed by: FAMILY MEDICINE

## 2024-06-04 PROCEDURE — 99213 OFFICE O/P EST LOW 20 MIN: CPT | Performed by: FAMILY MEDICINE

## 2024-06-04 PROCEDURE — 83036 HEMOGLOBIN GLYCOSYLATED A1C: CPT | Performed by: FAMILY MEDICINE

## 2024-06-04 PROCEDURE — 3051F HG A1C>EQUAL 7.0%<8.0%: CPT | Performed by: FAMILY MEDICINE

## 2024-06-04 PROCEDURE — 3078F DIAST BP <80 MM HG: CPT | Performed by: FAMILY MEDICINE

## 2024-06-04 RX ORDER — CLOPIDOGREL BISULFATE 75 MG/1
75 TABLET ORAL DAILY
Qty: 30 TABLET | Refills: 3 | Status: CANCELLED | OUTPATIENT
Start: 2024-06-04

## 2024-06-04 RX ORDER — AMITRIPTYLINE HYDROCHLORIDE 25 MG/1
25 TABLET, FILM COATED ORAL NIGHTLY PRN
Qty: 90 TABLET | Refills: 1 | Status: SHIPPED | OUTPATIENT
Start: 2024-06-04

## 2024-06-04 RX ORDER — CELECOXIB 200 MG/1
200 CAPSULE ORAL 2 TIMES DAILY
COMMUNITY
Start: 2024-05-20

## 2024-06-04 RX ORDER — OMEPRAZOLE 40 MG/1
40 CAPSULE, DELAYED RELEASE ORAL DAILY
Qty: 90 CAPSULE | Refills: 3 | Status: SHIPPED | OUTPATIENT
Start: 2024-06-04

## 2024-06-04 RX ORDER — INSULIN GLARGINE 100 [IU]/ML
24 INJECTION, SOLUTION SUBCUTANEOUS 2 TIMES DAILY
Qty: 60 ML | Refills: 2 | Status: SHIPPED | OUTPATIENT
Start: 2024-06-04

## 2024-06-04 ASSESSMENT — ENCOUNTER SYMPTOMS
WHEEZING: 0
SORE THROAT: 0
ABDOMINAL PAIN: 0
COUGH: 0
SHORTNESS OF BREATH: 0
DIARRHEA: 0
CONSTIPATION: 0
RHINORRHEA: 0

## 2024-06-04 NOTE — PROGRESS NOTES
[facility]  [address] [place] [id number]  Dept: [phone]  Dept Fax: [phone]     Chief Complaint:  Chief Complaint   Patient presents with    Diabetes     Last HgA1c was 6.2 on 12/05/23.       Vitals:    06/04/24 0755   BP: 138/72   Site: Right Upper Arm   Position: Sitting   Cuff Size: Medium Adult   Pulse: 85   SpO2: 95%   Weight: 86.5 kg (190 lb 9.6 oz)   Height: 1.6 m (5' 3\")       HPI:  69 y.o.female who presents for the following:      Insomnia: has been taking hydroxyzine for sleep with little relief at 100mg; wakes frequently    DM2: a1c 7.0 from 6.2; compliant with meds; NOT compliant with diet (sweets)  ; No excessive thirst, urination, or blurry vision.     Current diabetes regimen:  - Lantus 24 BID       -----------------------------------------------------------------------------    Assessment/Plan:  69 y.o. female here mainly for the following:  DM2  At goal but slipping; she'll work on diet  Insomnia  Trial of elavil next     Diagnosis Orders   1. Type 2 diabetes mellitus without complication, with long-term current use of insulin ([facility])  POCT glycosylated hemoglobin (Hb A1C)    insulin glargine (LANTUS SOLOSTAR) 100 UNIT/ML injection pen      2. Gastroesophageal reflux disease, unspecified whether esophagitis present  omeprazole (PRILOSEC) 40 MG delayed release capsule      3. Psychophysiological insomnia  amitriptyline (ELAVIL) 25 MG tablet           Return in about 6 months (around 12/4/2024) for DM2.    Johnnie Livingston MD      -----------------------------------------------------------------------------      Objective     Physical Exam:  Physical Exam  Vitals reviewed.   Constitutional:       General: She is not in acute distress.     Appearance: She is well-developed.   HENT:      Head: Normocephalic and atraumatic.   Cardiovascular:      Rate and Rhythm: Normal rate.   Pulmonary:      Effort: Pulmonary effort is normal. No respiratory distress.

## 2024-06-06 RX ORDER — CLOPIDOGREL BISULFATE 75 MG/1
75 TABLET ORAL DAILY
Qty: 90 TABLET | Refills: 3 | Status: SHIPPED | OUTPATIENT
Start: 2024-06-06

## 2024-06-06 NOTE — TELEPHONE ENCOUNTER
Requesting medication refill. Please approve or deny this request.    Rx requested:  Requested Prescriptions     Pending Prescriptions Disp Refills    clopidogrel (PLAVIX) 75 MG tablet 30 tablet 3     Sig: Take 1 tablet by mouth daily         Last Office Visit:   5/8/2024      Next Visit Date:  Future Appointments   Date Time Provider Department Center   11/11/2024  1:45 PM Jonas Flynn MD [facility]   12/4/2024  8:00 AM Johnnie Livingston MD [facility]

## 2024-06-10 DIAGNOSIS — E11.9 TYPE 2 DIABETES MELLITUS WITHOUT COMPLICATION, WITH LONG-TERM CURRENT USE OF INSULIN (HCC): Primary | ICD-10-CM

## 2024-06-10 DIAGNOSIS — Z79.4 TYPE 2 DIABETES MELLITUS WITHOUT COMPLICATION, WITH LONG-TERM CURRENT USE OF INSULIN (HCC): Primary | ICD-10-CM

## 2024-06-10 RX ORDER — SIMVASTATIN 40 MG
TABLET ORAL
Qty: 90 TABLET | Refills: 2 | Status: SHIPPED | OUTPATIENT
Start: 2024-06-10

## 2024-06-17 DIAGNOSIS — Z79.4 TYPE 2 DIABETES MELLITUS WITHOUT COMPLICATION, WITH LONG-TERM CURRENT USE OF INSULIN (HCC): ICD-10-CM

## 2024-06-17 DIAGNOSIS — E11.9 TYPE 2 DIABETES MELLITUS WITHOUT COMPLICATION, WITH LONG-TERM CURRENT USE OF INSULIN (HCC): ICD-10-CM

## 2024-06-17 RX ORDER — INSULIN GLARGINE 100 [IU]/ML
24 INJECTION, SOLUTION SUBCUTANEOUS 2 TIMES DAILY
Qty: 60 ML | Refills: 2 | Status: SHIPPED | OUTPATIENT
Start: 2024-06-17

## 2024-06-17 NOTE — TELEPHONE ENCOUNTER
Comments: Previous prescription sent to wrong pharmacy.      Last Office Visit (last PCP visit):   6/4/2024    Next Visit Date:  Future Appointments   Date Time Provider Department Center   11/11/2024  1:45 PM Jonas Flynn MD [facility]   12/4/2024  8:00 AM Johnnie Livingston MD [facility]       **If hasn't been seen in over a year OR hasn't followed up according to last diabetes/ADHD visit, make appointment for patient before sending refill to provider.    Rx requested:  Requested Prescriptions     Pending Prescriptions Disp Refills    insulin glargine (LANTUS SOLOSTAR) 100 UNIT/ML injection pen 60 mL 2     Sig: Inject 24 Units into the skin 2 times daily

## 2024-07-01 ENCOUNTER — APPOINTMENT (OUTPATIENT)
Dept: OTOLARYNGOLOGY | Facility: CLINIC | Age: 69
End: 2024-07-01
Payer: MEDICARE

## 2024-07-01 DIAGNOSIS — R22.1 NECK MASS: Primary | ICD-10-CM

## 2024-07-01 PROCEDURE — 1159F MED LIST DOCD IN RCRD: CPT | Performed by: OTOLARYNGOLOGY

## 2024-07-01 PROCEDURE — 99213 OFFICE O/P EST LOW 20 MIN: CPT | Performed by: OTOLARYNGOLOGY

## 2024-07-01 PROCEDURE — 1160F RVW MEDS BY RX/DR IN RCRD: CPT | Performed by: OTOLARYNGOLOGY

## 2024-07-01 PROCEDURE — 31575 DIAGNOSTIC LARYNGOSCOPY: CPT | Performed by: OTOLARYNGOLOGY

## 2024-07-01 NOTE — PROGRESS NOTES
Twyla Duran is a 69 y.o. year old female patient with Neck Mass       Patient presents to the office today for assessment of neck.  Patient with prior history of left neck mass which is lymphadenopathy versus possible parotid mass.  This was previously biopsied but resolved after biopsy.  Patient now states that this returned back in April and she is here today for further assessment.  All other ENT issues are negative.        Physical Exam:   General appearance: No acute distress. Normal facies. Symmetric facial movement. No gross lesions of the face are noted.  The external ear structures appear normal. The ear canals patent and the tympanic membranes are intact without evidence of air-fluid levels, retraction, or congenital defects.  Anterior rhinoscopy notes essentially a midline nasal septum. Examination is noted for normal healthy mucosal membranes without any evidence of lesions, polyps, or exudate. The tongue is normally mobile. There are no lesions on the gingiva, buccal, or oral mucosa. There are no oral cavity masses.  Patient with left neck mass versus lymphadenopathy level 2.  The trachea and parotid are clear. The thyroid bed is grossly unremarkable. The salivary gland structures are grossly unremarkable in order to assess the larynx, flexible laryngoscopy was performed based on the patient's history.  After topical anesthesia, a very complete flexible laryngoscopy was performed. This examination reveals a normal appearance to the laryngeal structures including the true cords, false cords, epiglottis, base of tongue, and piriform sinus, except as noted.  .    Assessment/Plan   1.  Neck mass    Patient with left-sided neck mass and a known smoker.  The patient at this time was found to have left-sided neck mass.  The patient is going to be set up for dedicated CT imaging of the neck and see us back following to review

## 2024-07-02 ENCOUNTER — TELEPHONE (OUTPATIENT)
Dept: OTOLARYNGOLOGY | Facility: CLINIC | Age: 69
End: 2024-07-02
Payer: MEDICARE

## 2024-07-02 NOTE — TELEPHONE ENCOUNTER
Patient is having her CT done on 7/8, she is going out of town from 7/15-8/15. She is not scheduled until 8/19 for her review.

## 2024-07-05 ENCOUNTER — LAB (OUTPATIENT)
Dept: LAB | Facility: LAB | Age: 69
End: 2024-07-05
Payer: MEDICARE

## 2024-07-05 DIAGNOSIS — R22.1 NECK MASS: ICD-10-CM

## 2024-07-05 LAB
CREAT SERPL-MCNC: 0.73 MG/DL (ref 0.5–1.05)
EGFRCR SERPLBLD CKD-EPI 2021: 90 ML/MIN/1.73M*2

## 2024-07-05 PROCEDURE — 36415 COLL VENOUS BLD VENIPUNCTURE: CPT

## 2024-07-05 PROCEDURE — 82565 ASSAY OF CREATININE: CPT

## 2024-07-08 ENCOUNTER — HOSPITAL ENCOUNTER (OUTPATIENT)
Dept: RADIOLOGY | Facility: HOSPITAL | Age: 69
Discharge: HOME | End: 2024-07-08
Payer: MEDICARE

## 2024-07-08 DIAGNOSIS — R22.1 NECK MASS: ICD-10-CM

## 2024-07-08 PROCEDURE — 70491 CT SOFT TISSUE NECK W/DYE: CPT

## 2024-07-08 PROCEDURE — 70491 CT SOFT TISSUE NECK W/DYE: CPT | Performed by: RADIOLOGY

## 2024-07-08 PROCEDURE — 2550000001 HC RX 255 CONTRASTS

## 2024-07-22 ENCOUNTER — APPOINTMENT (OUTPATIENT)
Dept: OTOLARYNGOLOGY | Facility: CLINIC | Age: 69
End: 2024-07-22
Payer: MEDICARE

## 2024-08-15 ENCOUNTER — OFFICE VISIT (OUTPATIENT)
Dept: FAMILY MEDICINE CLINIC | Age: 69
End: 2024-08-15
Payer: MEDICARE

## 2024-08-15 ENCOUNTER — HOSPITAL ENCOUNTER (OUTPATIENT)
Age: 69
Setting detail: SPECIMEN
Discharge: HOME | End: 2024-08-15

## 2024-08-15 VITALS
OXYGEN SATURATION: 95 % | HEART RATE: 91 BPM | BODY MASS INDEX: 33.91 KG/M2 | WEIGHT: 191.4 LBS | SYSTOLIC BLOOD PRESSURE: 134 MMHG | TEMPERATURE: 97.2 F | DIASTOLIC BLOOD PRESSURE: 76 MMHG | HEIGHT: 63 IN

## 2024-08-15 DIAGNOSIS — K13.79 MOUTH BLEEDING: ICD-10-CM

## 2024-08-15 DIAGNOSIS — R39.9 LOWER URINARY TRACT SYMPTOMS (LUTS): Primary | ICD-10-CM

## 2024-08-15 DIAGNOSIS — R05.3 CHRONIC COUGH: ICD-10-CM

## 2024-08-15 DIAGNOSIS — R39.9 LOWER URINARY TRACT SYMPTOMS (LUTS): ICD-10-CM

## 2024-08-15 LAB
APPEARANCE FLUID: ABNORMAL
BILIRUBIN, POC: ABNORMAL
BLOOD URINE, POC: ABNORMAL
CLARITY, POC: ABNORMAL
COLOR, POC: YELLOW
GLUCOSE URINE, POC: ABNORMAL
KETONES, POC: ABNORMAL
LEUKOCYTE EST, POC: ABNORMAL
NITRITE, POC: ABNORMAL
PH, POC: ABNORMAL
PROTEIN, POC: ABNORMAL
SPECIFIC GRAVITY, POC: 1.01
UROBILINOGEN, POC: ABNORMAL

## 2024-08-15 PROCEDURE — 81002 URINALYSIS NONAUTO W/O SCOPE: CPT | Performed by: FAMILY MEDICINE

## 2024-08-15 RX ORDER — NITROFURANTOIN 25; 75 MG/1; MG/1
100 CAPSULE ORAL 2 TIMES DAILY
Qty: 14 CAPSULE | Refills: 0 | Status: SHIPPED | OUTPATIENT
Start: 2024-08-15 | End: 2024-08-22

## 2024-08-15 RX ORDER — BENZONATATE 100 MG/1
100 CAPSULE ORAL 3 TIMES DAILY PRN
Qty: 30 CAPSULE | Refills: 0 | Status: SHIPPED | OUTPATIENT
Start: 2024-08-15 | End: 2024-08-25

## 2024-08-15 SDOH — ECONOMIC STABILITY: FOOD INSECURITY: WITHIN THE PAST 12 MONTHS, THE FOOD YOU BOUGHT JUST DIDN'T LAST AND YOU DIDN'T HAVE MONEY TO GET MORE.: NEVER TRUE

## 2024-08-15 SDOH — ECONOMIC STABILITY: FOOD INSECURITY: WITHIN THE PAST 12 MONTHS, YOU WORRIED THAT YOUR FOOD WOULD RUN OUT BEFORE YOU GOT MONEY TO BUY MORE.: NEVER TRUE

## 2024-08-15 SDOH — ECONOMIC STABILITY: INCOME INSECURITY: HOW HARD IS IT FOR YOU TO PAY FOR THE VERY BASICS LIKE FOOD, HOUSING, MEDICAL CARE, AND HEATING?: NOT HARD AT ALL

## 2024-08-15 ASSESSMENT — ENCOUNTER SYMPTOMS
SHORTNESS OF BREATH: 0
COUGH: 1
SORE THROAT: 0
CONSTIPATION: 0
WHEEZING: 0
RHINORRHEA: 0
DIARRHEA: 0
ABDOMINAL PAIN: 0

## 2024-08-15 NOTE — PROGRESS NOTES
monitor strips Use  strip 11    Lancet Device MISC 1 Device by Does not apply route 4 times daily (before meals and nightly) Test 4x/day. 1 each 0    blood glucose monitor kit and supplies Use TID 1 kit 0     No current facility-administered medications for this visit.

## 2024-08-18 LAB — BACTERIA UR CULT: NORMAL

## 2024-08-19 ENCOUNTER — APPOINTMENT (OUTPATIENT)
Dept: OTOLARYNGOLOGY | Facility: CLINIC | Age: 69
End: 2024-08-19
Payer: MEDICARE

## 2024-08-19 DIAGNOSIS — K11.8 PAROTID MASS: Primary | ICD-10-CM

## 2024-08-19 DIAGNOSIS — R22.1 NECK MASS: ICD-10-CM

## 2024-08-19 PROCEDURE — 99214 OFFICE O/P EST MOD 30 MIN: CPT | Performed by: OTOLARYNGOLOGY

## 2024-08-19 PROCEDURE — 1160F RVW MEDS BY RX/DR IN RCRD: CPT | Performed by: OTOLARYNGOLOGY

## 2024-08-19 PROCEDURE — 1159F MED LIST DOCD IN RCRD: CPT | Performed by: OTOLARYNGOLOGY

## 2024-08-19 NOTE — PROGRESS NOTES
The patient returns.  We are seeing her back today with her completed CT scan of the neck.  This shows evidence of a mass involving the tail parotid region measuring almost 2.2 cm in greatest dimension.  Also there is possibly a 1.3 cm lymph node between the tail parotid and the submandibular gland.  All remaining ENT inquiry is otherwise clear.  Patient denying any new changes.  There have been no significant changes in past medical or past surgical histories except as mentioned.    Exam:  No acute distress.  The external ear structures appear normal. The ear canals patent and the tympanic membranes are intact without evidence of air-fluid levels, retraction, or congenital defects.  Anterior rhinoscopy notes essentially a midline nasal septum. Examination is noted for normal healthy mucosal membranes without any evidence of lesions, polyps, or exudate. The tongue is normally mobile. There are no lesions on the gingiva, buccal, or oral mucosa. There are no oral cavity masses.  The neck is negative for mass lymphadenopathy with the exception of a suspected left tail parotid mass with possibly 2 masses present. The trachea and right parotid are clear. The thyroid bed is grossly unremarkable. The salivary gland structures are grossly unremarkable.    Results: CT findings as noted above personally reviewed by me today.    Assessment and plan:  Left neck/parotid mass as described.  Patient will be recommended for definitive surgical resection of same.  Detailed discussion with her regarding the operative indication as well as alternatives and risk and benefits.  Risk include, but not limited to, bleeding and infection as well as recurrence along with injury to the facial nerve with alteration of the facial function in particular for the marginal and cervical branches.  Additionally, we will plan on resecting any other associated masses.  Detailed discussion with her with all questions answered.  She appears to understand  and agrees to proceed we will set up in the near future.

## 2024-08-21 ENCOUNTER — PREP FOR PROCEDURE (OUTPATIENT)
Dept: OTOLARYNGOLOGY | Facility: CLINIC | Age: 69
End: 2024-08-21
Payer: MEDICARE

## 2024-08-21 DIAGNOSIS — K11.8 PAROTID MASS: Primary | ICD-10-CM

## 2024-08-28 DIAGNOSIS — R05.3 CHRONIC COUGH: ICD-10-CM

## 2024-08-28 RX ORDER — BENZONATATE 100 MG/1
100 CAPSULE ORAL 3 TIMES DAILY PRN
Qty: 30 CAPSULE | Refills: 0 | Status: SHIPPED | OUTPATIENT
Start: 2024-08-28 | End: 2024-09-07

## 2024-08-28 NOTE — TELEPHONE ENCOUNTER
Comments:     Last Office Visit (last PCP visit):   8/15/2024    Next Visit Date:  Future Appointments   Date Time Provider Department Center   11/11/2024  1:45 PM Jonas Flynn MD [facility]   12/4/2024  8:00 AM Johnnie Livingston MD [facility]       **If hasn't been seen in over a year OR hasn't followed up according to last diabetes/ADHD visit, make appointment for patient before sending refill to provider.    Rx requested:  Requested Prescriptions     Pending Prescriptions Disp Refills    benzonatate (TESSALON PERLES) 100 MG capsule 30 capsule 0     Sig: Take 1 capsule by mouth 3 times daily as needed for Cough

## 2024-08-29 RX ORDER — OMEPRAZOLE 40 MG/1
40 CAPSULE, DELAYED RELEASE ORAL
COMMUNITY

## 2024-08-29 RX ORDER — OLANZAPINE 20 MG/1
20 TABLET ORAL NIGHTLY
COMMUNITY

## 2024-08-29 RX ORDER — INSULIN GLARGINE 100 [IU]/ML
24 INJECTION, SOLUTION SUBCUTANEOUS 2 TIMES DAILY
COMMUNITY
Start: 2024-06-17

## 2024-08-29 RX ORDER — CLOPIDOGREL BISULFATE 75 MG/1
1 TABLET ORAL DAILY
COMMUNITY
Start: 2022-03-09

## 2024-08-29 RX ORDER — LISINOPRIL 10 MG/1
1 TABLET ORAL NIGHTLY
COMMUNITY
Start: 2024-02-29

## 2024-08-29 RX ORDER — SIMVASTATIN 40 MG/1
40 TABLET, FILM COATED ORAL NIGHTLY
COMMUNITY
Start: 2024-04-02

## 2024-08-29 RX ORDER — AMITRIPTYLINE HYDROCHLORIDE 25 MG/1
1 TABLET, FILM COATED ORAL NIGHTLY PRN
COMMUNITY
Start: 2024-06-04

## 2024-08-29 RX ORDER — ACETAMINOPHEN AND CODEINE PHOSPHATE 300; 60 MG/1; MG/1
1 TABLET ORAL 2 TIMES DAILY PRN
COMMUNITY
Start: 2024-03-06

## 2024-08-29 RX ORDER — DULOXETIN HYDROCHLORIDE 30 MG/1
30 CAPSULE, DELAYED RELEASE ORAL 3 TIMES DAILY
COMMUNITY

## 2024-08-29 RX ORDER — BENZONATATE 100 MG/1
1 CAPSULE ORAL EVERY 8 HOURS PRN
COMMUNITY
Start: 2024-08-15

## 2024-08-29 NOTE — PREPROCEDURE INSTRUCTIONS
Reviewed pre-op instructions with patient including NPO after midnight, must have , hospital and check in location, and day of surgery routine. Patient aware to come to [facility] for PAT lab/EKG on 8/30/24. Patient aware to hold Plavix as instructed.

## 2024-08-30 ENCOUNTER — HOSPITAL ENCOUNTER (OUTPATIENT)
Dept: CARDIOLOGY | Facility: HOSPITAL | Age: 69
Discharge: HOME | End: 2024-08-30
Payer: MEDICARE

## 2024-08-30 ENCOUNTER — LAB (OUTPATIENT)
Dept: LAB | Facility: HOSPITAL | Age: 69
End: 2024-08-30
Payer: MEDICARE

## 2024-08-30 DIAGNOSIS — K11.8 PAROTID MASS: ICD-10-CM

## 2024-08-30 LAB
ANION GAP SERPL CALC-SCNC: 10 MMOL/L (ref 10–20)
BUN SERPL-MCNC: 10 MG/DL (ref 6–23)
CALCIUM SERPL-MCNC: 8.9 MG/DL (ref 8.6–10.3)
CHLORIDE SERPL-SCNC: 103 MMOL/L (ref 98–107)
CO2 SERPL-SCNC: 31 MMOL/L (ref 21–32)
CREAT SERPL-MCNC: 0.74 MG/DL (ref 0.5–1.05)
EGFRCR SERPLBLD CKD-EPI 2021: 89 ML/MIN/1.73M*2
ERYTHROCYTE [DISTWIDTH] IN BLOOD BY AUTOMATED COUNT: 18.3 % (ref 11.5–14.5)
GLUCOSE SERPL-MCNC: 151 MG/DL (ref 74–99)
HCT VFR BLD AUTO: 44.3 % (ref 36–46)
HGB BLD-MCNC: 14.4 G/DL (ref 12–16)
MCH RBC QN AUTO: 28.9 PG (ref 26–34)
MCHC RBC AUTO-ENTMCNC: 32.5 G/DL (ref 32–36)
MCV RBC AUTO: 89 FL (ref 80–100)
NRBC BLD-RTO: 0 /100 WBCS (ref 0–0)
PLATELET # BLD AUTO: 157 X10*3/UL (ref 150–450)
POTASSIUM SERPL-SCNC: 4.2 MMOL/L (ref 3.5–5.3)
RBC # BLD AUTO: 4.98 X10*6/UL (ref 4–5.2)
SODIUM SERPL-SCNC: 140 MMOL/L (ref 136–145)
WBC # BLD AUTO: 11.6 X10*3/UL (ref 4.4–11.3)

## 2024-08-30 PROCEDURE — 85027 COMPLETE CBC AUTOMATED: CPT

## 2024-08-30 PROCEDURE — 82565 ASSAY OF CREATININE: CPT

## 2024-08-30 PROCEDURE — 36415 COLL VENOUS BLD VENIPUNCTURE: CPT

## 2024-08-30 PROCEDURE — 93010 ELECTROCARDIOGRAM REPORT: CPT | Performed by: INTERNAL MEDICINE

## 2024-08-30 PROCEDURE — 93005 ELECTROCARDIOGRAM TRACING: CPT

## 2024-08-31 LAB
ATRIAL RATE: 81 BPM
P AXIS: 42 DEGREES
P OFFSET: 188 MS
P ONSET: 131 MS
PR INTERVAL: 156 MS
Q ONSET: 209 MS
QRS COUNT: 13 BEATS
QRS DURATION: 94 MS
QT INTERVAL: 388 MS
QTC CALCULATION(BAZETT): 450 MS
QTC FREDERICIA: 428 MS
R AXIS: -68 DEGREES
T AXIS: 46 DEGREES
T OFFSET: 403 MS
VENTRICULAR RATE: 81 BPM

## 2024-09-05 ENCOUNTER — ANESTHESIA (OUTPATIENT)
Dept: OPERATING ROOM | Facility: HOSPITAL | Age: 69
End: 2024-09-05
Payer: MEDICARE

## 2024-09-05 ENCOUNTER — ANESTHESIA EVENT (OUTPATIENT)
Dept: OPERATING ROOM | Facility: HOSPITAL | Age: 69
End: 2024-09-05
Payer: MEDICARE

## 2024-09-05 ENCOUNTER — HOSPITAL ENCOUNTER (OUTPATIENT)
Facility: HOSPITAL | Age: 69
Setting detail: OUTPATIENT SURGERY
Discharge: HOME | End: 2024-09-05
Attending: OTOLARYNGOLOGY | Admitting: OTOLARYNGOLOGY
Payer: MEDICARE

## 2024-09-05 VITALS
RESPIRATION RATE: 18 BRPM | TEMPERATURE: 98.8 F | DIASTOLIC BLOOD PRESSURE: 62 MMHG | WEIGHT: 185.41 LBS | HEIGHT: 63 IN | HEART RATE: 98 BPM | BODY MASS INDEX: 32.85 KG/M2 | SYSTOLIC BLOOD PRESSURE: 135 MMHG | OXYGEN SATURATION: 94 %

## 2024-09-05 DIAGNOSIS — R22.1 NECK MASS: Primary | ICD-10-CM

## 2024-09-05 DIAGNOSIS — K11.8 PAROTID MASS: ICD-10-CM

## 2024-09-05 PROBLEM — E66.811 CLASS 1 OBESITY IN ADULT: Status: ACTIVE | Noted: 2024-09-05

## 2024-09-05 PROBLEM — E11.9 DIABETES MELLITUS, TYPE 2 (MULTI): Status: ACTIVE | Noted: 2024-09-05

## 2024-09-05 PROBLEM — E66.9 CLASS 1 OBESITY IN ADULT: Status: ACTIVE | Noted: 2024-09-05

## 2024-09-05 PROBLEM — E78.5 HYPERLIPIDEMIA: Status: ACTIVE | Noted: 2024-09-05

## 2024-09-05 PROBLEM — J44.9 CHRONIC OBSTRUCTIVE PULMONARY DISEASE (MULTI): Status: ACTIVE | Noted: 2024-09-05

## 2024-09-05 PROBLEM — I63.9 CVA (CEREBRAL VASCULAR ACCIDENT) (MULTI): Status: ACTIVE | Noted: 2024-09-05

## 2024-09-05 PROBLEM — F17.200 CURRENT SMOKER: Status: ACTIVE | Noted: 2024-09-05

## 2024-09-05 PROBLEM — I10 HTN (HYPERTENSION): Status: ACTIVE | Noted: 2024-09-05

## 2024-09-05 LAB
GLUCOSE BLD MANUAL STRIP-MCNC: 89 MG/DL (ref 74–99)
GLUCOSE BLD MANUAL STRIP-MCNC: 96 MG/DL (ref 74–99)

## 2024-09-05 PROCEDURE — 2500000004 HC RX 250 GENERAL PHARMACY W/ HCPCS (ALT 636 FOR OP/ED)

## 2024-09-05 PROCEDURE — 3700000001 HC GENERAL ANESTHESIA TIME - INITIAL BASE CHARGE: Performed by: OTOLARYNGOLOGY

## 2024-09-05 PROCEDURE — 3600000009 HC OR TIME - EACH INCREMENTAL 1 MINUTE - PROCEDURE LEVEL FOUR: Performed by: OTOLARYNGOLOGY

## 2024-09-05 PROCEDURE — 7100000002 HC RECOVERY ROOM TIME - EACH INCREMENTAL 1 MINUTE: Performed by: OTOLARYNGOLOGY

## 2024-09-05 PROCEDURE — 2720000007 HC OR 272 NO HCPCS: Performed by: OTOLARYNGOLOGY

## 2024-09-05 PROCEDURE — 7100000001 HC RECOVERY ROOM TIME - INITIAL BASE CHARGE: Performed by: OTOLARYNGOLOGY

## 2024-09-05 PROCEDURE — 3600000004 HC OR TIME - INITIAL BASE CHARGE - PROCEDURE LEVEL FOUR: Performed by: OTOLARYNGOLOGY

## 2024-09-05 PROCEDURE — 2500000004 HC RX 250 GENERAL PHARMACY W/ HCPCS (ALT 636 FOR OP/ED): Mod: JZ | Performed by: OTOLARYNGOLOGY

## 2024-09-05 PROCEDURE — 7100000009 HC PHASE TWO TIME - INITIAL BASE CHARGE: Performed by: OTOLARYNGOLOGY

## 2024-09-05 PROCEDURE — 2500000005 HC RX 250 GENERAL PHARMACY W/O HCPCS

## 2024-09-05 PROCEDURE — 2500000004 HC RX 250 GENERAL PHARMACY W/ HCPCS (ALT 636 FOR OP/ED): Performed by: STUDENT IN AN ORGANIZED HEALTH CARE EDUCATION/TRAINING PROGRAM

## 2024-09-05 PROCEDURE — 7100000010 HC PHASE TWO TIME - EACH INCREMENTAL 1 MINUTE: Performed by: OTOLARYNGOLOGY

## 2024-09-05 PROCEDURE — 21555 EXC NECK LES SC < 3 CM: CPT | Performed by: OTOLARYNGOLOGY

## 2024-09-05 PROCEDURE — 88307 TISSUE EXAM BY PATHOLOGIST: CPT | Mod: TC,ELYLAB | Performed by: OTOLARYNGOLOGY

## 2024-09-05 PROCEDURE — 2500000005 HC RX 250 GENERAL PHARMACY W/O HCPCS: Performed by: OTOLARYNGOLOGY

## 2024-09-05 PROCEDURE — 3700000002 HC GENERAL ANESTHESIA TIME - EACH INCREMENTAL 1 MINUTE: Performed by: OTOLARYNGOLOGY

## 2024-09-05 PROCEDURE — 42415 EXCISE PAROTID GLAND/LESION: CPT | Performed by: OTOLARYNGOLOGY

## 2024-09-05 PROCEDURE — 82947 ASSAY GLUCOSE BLOOD QUANT: CPT

## 2024-09-05 RX ORDER — SODIUM CHLORIDE, SODIUM LACTATE, POTASSIUM CHLORIDE, CALCIUM CHLORIDE 600; 310; 30; 20 MG/100ML; MG/100ML; MG/100ML; MG/100ML
50 INJECTION, SOLUTION INTRAVENOUS CONTINUOUS
Status: DISCONTINUED | OUTPATIENT
Start: 2024-09-05 | End: 2024-09-05 | Stop reason: HOSPADM

## 2024-09-05 RX ORDER — OXYCODONE AND ACETAMINOPHEN 5; 325 MG/1; MG/1
1 TABLET ORAL EVERY 6 HOURS PRN
Qty: 15 TABLET | Refills: 0 | Status: SHIPPED | OUTPATIENT
Start: 2024-09-05

## 2024-09-05 RX ORDER — MIDAZOLAM HYDROCHLORIDE 1 MG/ML
INJECTION, SOLUTION INTRAMUSCULAR; INTRAVENOUS AS NEEDED
Status: DISCONTINUED | OUTPATIENT
Start: 2024-09-05 | End: 2024-09-05

## 2024-09-05 RX ORDER — FENTANYL CITRATE 50 UG/ML
25 INJECTION, SOLUTION INTRAMUSCULAR; INTRAVENOUS EVERY 5 MIN PRN
Status: CANCELLED | OUTPATIENT
Start: 2024-09-05

## 2024-09-05 RX ORDER — PHENYLEPHRINE HCL IN 0.9% NACL 1 MG/10 ML
SYRINGE (ML) INTRAVENOUS AS NEEDED
Status: DISCONTINUED | OUTPATIENT
Start: 2024-09-05 | End: 2024-09-05

## 2024-09-05 RX ORDER — SUCCINYLCHOLINE CHLORIDE 100 MG/5ML
SYRINGE (ML) INTRAVENOUS AS NEEDED
Status: DISCONTINUED | OUTPATIENT
Start: 2024-09-05 | End: 2024-09-05

## 2024-09-05 RX ORDER — PROPOFOL 10 MG/ML
INJECTION, EMULSION INTRAVENOUS AS NEEDED
Status: DISCONTINUED | OUTPATIENT
Start: 2024-09-05 | End: 2024-09-05

## 2024-09-05 RX ORDER — SODIUM CHLORIDE, SODIUM LACTATE, POTASSIUM CHLORIDE, CALCIUM CHLORIDE 600; 310; 30; 20 MG/100ML; MG/100ML; MG/100ML; MG/100ML
100 INJECTION, SOLUTION INTRAVENOUS CONTINUOUS
Status: CANCELLED | OUTPATIENT
Start: 2024-09-05

## 2024-09-05 RX ORDER — ONDANSETRON HYDROCHLORIDE 2 MG/ML
4 INJECTION, SOLUTION INTRAVENOUS ONCE AS NEEDED
Status: CANCELLED | OUTPATIENT
Start: 2024-09-05

## 2024-09-05 RX ORDER — OXYCODONE HYDROCHLORIDE 5 MG/1
5 TABLET ORAL EVERY 4 HOURS PRN
Status: CANCELLED | OUTPATIENT
Start: 2024-09-05

## 2024-09-05 RX ORDER — LABETALOL HYDROCHLORIDE 5 MG/ML
5 INJECTION, SOLUTION INTRAVENOUS ONCE AS NEEDED
Status: CANCELLED | OUTPATIENT
Start: 2024-09-05

## 2024-09-05 RX ORDER — SODIUM CHLORIDE 0.9 G/100ML
IRRIGANT IRRIGATION AS NEEDED
Status: DISCONTINUED | OUTPATIENT
Start: 2024-09-05 | End: 2024-09-05 | Stop reason: HOSPADM

## 2024-09-05 RX ORDER — ONDANSETRON HYDROCHLORIDE 2 MG/ML
INJECTION, SOLUTION INTRAVENOUS AS NEEDED
Status: DISCONTINUED | OUTPATIENT
Start: 2024-09-05 | End: 2024-09-05

## 2024-09-05 RX ORDER — FAMOTIDINE 10 MG/ML
INJECTION INTRAVENOUS AS NEEDED
Status: DISCONTINUED | OUTPATIENT
Start: 2024-09-05 | End: 2024-09-05

## 2024-09-05 RX ORDER — DOXYCYCLINE HYCLATE 100 MG
100 TABLET ORAL 2 TIMES DAILY
Qty: 20 TABLET | Refills: 0 | Status: SHIPPED | OUTPATIENT
Start: 2024-09-05 | End: 2024-09-15

## 2024-09-05 RX ORDER — FENTANYL CITRATE 50 UG/ML
INJECTION, SOLUTION INTRAMUSCULAR; INTRAVENOUS AS NEEDED
Status: DISCONTINUED | OUTPATIENT
Start: 2024-09-05 | End: 2024-09-05

## 2024-09-05 RX ORDER — HYDROCODONE BITARTRATE AND ACETAMINOPHEN 5; 325 MG/1; MG/1
1 TABLET ORAL 4 TIMES DAILY PRN
Qty: 15 TABLET | Refills: 0 | Status: SHIPPED | OUTPATIENT
Start: 2024-09-05

## 2024-09-05 RX ORDER — ROCURONIUM BROMIDE 10 MG/ML
INJECTION, SOLUTION INTRAVENOUS AS NEEDED
Status: DISCONTINUED | OUTPATIENT
Start: 2024-09-05 | End: 2024-09-05

## 2024-09-05 RX ORDER — LIDOCAINE HYDROCHLORIDE AND EPINEPHRINE 10; 10 MG/ML; UG/ML
INJECTION, SOLUTION INFILTRATION; PERINEURAL AS NEEDED
Status: DISCONTINUED | OUTPATIENT
Start: 2024-09-05 | End: 2024-09-05 | Stop reason: HOSPADM

## 2024-09-05 RX ORDER — GLYCOPYRROLATE 0.2 MG/ML
INJECTION INTRAMUSCULAR; INTRAVENOUS AS NEEDED
Status: DISCONTINUED | OUTPATIENT
Start: 2024-09-05 | End: 2024-09-05

## 2024-09-05 RX ORDER — LIDOCAINE HYDROCHLORIDE 20 MG/ML
INJECTION, SOLUTION INFILTRATION; PERINEURAL AS NEEDED
Status: DISCONTINUED | OUTPATIENT
Start: 2024-09-05 | End: 2024-09-05

## 2024-09-05 RX ORDER — CEFAZOLIN SODIUM 2 G/100ML
2 INJECTION, SOLUTION INTRAVENOUS ONCE
Status: COMPLETED | OUTPATIENT
Start: 2024-09-05 | End: 2024-09-05

## 2024-09-05 SDOH — HEALTH STABILITY: MENTAL HEALTH: CURRENT SMOKER: 1

## 2024-09-05 ASSESSMENT — PAIN SCALES - GENERAL
PAINLEVEL_OUTOF10: 0 - NO PAIN
PAINLEVEL_OUTOF10: 3
PAINLEVEL_OUTOF10: 0 - NO PAIN

## 2024-09-05 ASSESSMENT — PAIN - FUNCTIONAL ASSESSMENT
PAIN_FUNCTIONAL_ASSESSMENT: 0-10

## 2024-09-05 ASSESSMENT — COLUMBIA-SUICIDE SEVERITY RATING SCALE - C-SSRS
6. HAVE YOU EVER DONE ANYTHING, STARTED TO DO ANYTHING, OR PREPARED TO DO ANYTHING TO END YOUR LIFE?: NO
1. IN THE PAST MONTH, HAVE YOU WISHED YOU WERE DEAD OR WISHED YOU COULD GO TO SLEEP AND NOT WAKE UP?: NO
2. HAVE YOU ACTUALLY HAD ANY THOUGHTS OF KILLING YOURSELF?: NO

## 2024-09-05 ASSESSMENT — PAIN DESCRIPTION - DESCRIPTORS: DESCRIPTORS: DISCOMFORT

## 2024-09-05 NOTE — ANESTHESIA PROCEDURE NOTES
Airway  Date/Time: 9/5/2024 12:53 PM  Urgency: elective    Airway not difficult    Staffing  Performed: SRNA   Authorized by: Juan Carlos Lawrence MD    Performed by: ALEA Granados-CRNA, DNAP  Patient location during procedure: OR    Indications and Patient Condition  Indications for airway management: anesthesia  Spontaneous Ventilation: absent  Sedation level: deep  Preoxygenated: yes  Patient position: sniffing  MILS maintained throughout  Mask difficulty assessment: 0 - not attempted  Planned trial extubation    Final Airway Details  Final airway type: endotracheal airway      Successful airway: ETT  Cuffed: yes   Successful intubation technique: video laryngoscopy  Facilitating devices/methods: intubating stylet  Endotracheal tube insertion site: oral  Blade: Felicitas  Blade size: #3  ETT size (mm): 7.0  Cormack-Lehane Classification: grade I - full view of glottis  Placement verified by: capnometry   Measured from: lips  ETT to lips (cm): 21  Number of attempts at approach: 1

## 2024-09-05 NOTE — ANESTHESIA POSTPROCEDURE EVALUATION
Patient: Twyla Duran    Procedure Summary       Date: 09/05/24 Room / Location: [facility] / [facility]    Anesthesia Start: 1241 Anesthesia Stop: 1548    Procedure: Parotidectomy-8/26 (Left) Diagnosis:       Parotid mass      (Parotid mass [K11.8])    Surgeons: Uziel Nunn MD Responsible Provider: Juan Ray MD    Anesthesia Type: general ASA Status: 3            Anesthesia Type: general    Vitals Value Taken Time   /106 09/05/24 1544   Temp 36.3 09/05/24 1548   Pulse 104 09/05/24 1548   Resp 16 09/05/24 1548   SpO2 97 % 09/05/24 1546   Vitals shown include unfiled device data.    Anesthesia Post Evaluation    Patient location during evaluation: PACU  Patient participation: complete - patient participated  Level of consciousness: awake  Pain management: adequate  Multimodal analgesia pain management approach  Airway patency: patent  Cardiovascular status: acceptable  Respiratory status: acceptable and face mask  Hydration status: acceptable  Postoperative Nausea and Vomiting: none        No notable events documented.

## 2024-09-05 NOTE — ANESTHESIA PREPROCEDURE EVALUATION
Twyla Duran is a 69 y.o. 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]    Relevant Problems   Cardiac   (+) HTN (hypertension)   (+) Hyperlipidemia      Pulmonary   (+) Chronic obstructive pulmonary disease (Multi)      Neuro   (+) CVA (cerebral vascular accident) (Multi)      Endocrine   (+) Class 1 obesity in adult   (+) Diabetes mellitus, type 2 (Multi)      Digestive   (+) Parotid mass      Tobacco   (+) Current smoker       Lab Results   Component Value Date    HGB 14.4 08/30/2024    HCT 44.3 08/30/2024    WBC 11.6 (H) 08/30/2024     08/30/2024     08/30/2024    K 4.2 08/30/2024     08/30/2024    CREATININE 0.74 08/30/2024    BUN 10 08/30/2024     EKG 8/2024:  Normal sinus rhythm  Left axis deviation  Low voltage QRS  Possible Anterolateral infarct (cited on or before 25-OCT-2007)  Abnormal ECG    Social History     Tobacco Use   Smoking Status Every Day    Current packs/day: 0.50    Types: Cigarettes   Smokeless Tobacco Never       Allergies   Allergen Reactions    Aspirin Swelling     face swells, eyes swell shut    Penicillins Rash     last tried taking as a child.       Current Outpatient Medications   Medication Instructions    acetaminophen-codeine (Tylenol w/ Codeine #4) 300-60 mg tablet 1 tablet, oral, 2 times daily PRN    amitriptyline (Elavil) 25 mg tablet 1 tablet, oral, Nightly PRN    ascorbic acid (VITAMIN C ORAL) oral, Daily    benzonatate (Tessalon) 100 mg capsule 1 capsule, oral, Every 8 hours PRN    cholecalciferol, vitamin D3, (VITAMIN D3 ORAL) oral, Daily    clopidogrel (Plavix) 75 mg tablet 1 tablet, oral, Daily    DULoxetine (CYMBALTA) 30 mg, oral, 3 times daily, Do not crush or chew.    Lantus Solostar U-100 Insulin 24 Units, subcutaneous, 2 times daily    lisinopril 10 mg tablet 1 tablet, oral, Nightly    OLANZapine (ZYPREXA) 20 mg, oral, Nightly    omeprazole (PRILOSEC) 40 mg, oral, Do  not crush or chew.    simvastatin (ZOCOR) 40 mg, oral, Nightly       Past Surgical History:   Procedure Laterality Date    COLONOSCOPY      ELBOW SURGERY      ENDOMETRIAL ABLATION      SPINE SURGERY      TONSILLECTOMY      UPPER GASTROINTESTINAL ENDOSCOPY         No family history on file.    NPO Details:  No data recorded    Physical Exam    Airway  Mallampati: III  TM distance: >3 FB     Cardiovascular    Dental   (+) upper dentures, lower dentures     Pulmonary    Abdominal            Anesthesia Plan    History of general anesthesia?: yes  History of complications of general anesthesia?: no    ASA 3     general     The patient is a current smoker.  Patient was previously instructed to abstain from smoking on day of procedure.  Patient did not smoke on day of procedure.    intravenous induction   Postoperative administration of opioids is intended.  Trial extubation is planned.  Anesthetic plan and risks discussed with patient.    Plan discussed with CRNA.

## 2024-09-05 NOTE — DISCHARGE INSTRUCTIONS
General Anesthesia Discharge Instructions    About this topic  You may need general anesthesia if you need to be asleep during a procedure. Your doctor will use drugs to block the signals that go from your nerves to your brain. Doctors give general anesthesia during a surgery or procedure to:  Allow you to sleep  Help your body be still  Relax your muscles  Help you to relax and be pain free  Keep you from remembering the surgery  Let the doctor manage your airway, breathing, and blood flow  The doctor or nurse anesthetist gives general anesthesia by a shot into your vein. Sometimes, you may breathe in a gas through a mask placed over your face.  What care is needed at home?  Ask your doctor what you need to do when you go home. Make sure you ask questions if you do not understand what the doctor says.  Your doctor may give you drugs to prevent or treat an upset stomach from the anesthetic. Take them as ordered.  If your throat is sore, suck on ice chips or popsicles to ease throat pain.  Put 2 to 3 pillows under your head and back when you lie down to help you breathe easier.  For the first 24 to 48 hours:  Do not operate heavy or dangerous machinery.  Do not make major decisions or sign important papers. You may not be able to think clearly.  Avoid beer, wine, or mixed drinks.  You are at a higher risk of falling for at least 24 hours after general anesthesia.  Take extra care when you get up.  Do not change positions quickly.  Do not rush when you need to go to the bathroom or to answer the phone.  Ask for help if you feel unsteady when you try to walk.  Wear shoes with non-slip soles and low heels.  What follow-up care is needed?  Your doctor may ask you to come back to the office to check on your progress. Be sure to keep these visits.  If you have stitches that do not dissolve or staples, you will need to have them removed. Your doctor will want to do this in 1 to 2 weeks. If the doctor used skin glue, the  glue will fall off on its own.  What drugs may be needed?  The doctor may order drugs to:  Help with pain  Treat an upset stomach or throwing up  Will physical activity be limited?  You will not be allowed to drive right away after the procedure. Ask a family member or a friend to drive you home.  Avoid trying to get out of bed without help until you are sure of your balance.  You may have to limit your activity. Talk to your doctor about if you need to limit how much you lift or limit exercise after your procedure.  What changes to diet are needed?  Start with a light diet when you are fully awake. This includes things that are easy to swallow like soups, pudding, jello, toast, and eggs. Slowly progress to your normal diet.  What problems could happen?  Low blood pressure  Breathing problems  Upset stomach or throwing up  Dizziness  Blood clots  Infection  When do I need to call the doctor?  Trouble breathing  Upset stomach or throwing up more than 3 times in the next 2 days  Dizziness  Teach Back: Helping You Understand  The Teach Back Method helps you understand the information we are giving you. After you talk with the staff, tell them in your own words what you learned. This helps to make sure the staff has described each thing clearly. It also helps to explain things that may have been confusing. Before going home, make sure you can do these:  I can tell you about my procedure.  I can tell you if I need to follow up with my doctor.  I can tell you what is good for me to eat and drink the next day.  I can tell you what I would do if I have trouble breathing, an upset stomach, or dizziness.  Where can I learn more?  [employer]  https://www.nigms.nih.gov/education/pages/factsheet_Anesthesia.aspx  NHS Choices  http://www.nhs.uk/conditions/Anaesthetic-general/Pages/Definition.aspx  Last Reviewed Date  2020-04-22    Parotidectomy Discharge Instructions    About this topic  The  parotid glands make saliva. They are found inside both of the cheeks. A small duct connects the parotid gland to the mouth so saliva can travel to the mouth and help digest food.  During a parotidectomy, the doctor removes part or all of the parotid gland. This procedure is often done to remove a growth or tumor.    What care is needed at home?  Ask your doctor what you need to do when you go home. Make sure you ask questions if you do not understand what the doctor says. This way you will know what you need to do.  Keep your head raised on 2 pillows when you lie down or sleep in a recliner. Ask your doctor how long you should sleep with your head raised.  If you have a drain tube, ask your doctor what care is needed.  Talk to your doctor about how to care for your cut site. Ask your doctor about:  When you should change your bandages  When you may take a bath or shower and wash your hair  If you need to be careful with lifting, pulling, or pushing things over 10 pounds (4.5 kg)  When you may go back to your normal activities like work or driving  Be sure to wash your hands before and after touching your wound or dressing.  Avoid activities that have you bend over or move your head forward as directed by your doctor.  What follow-up care is needed?  Your doctor will ask you to make visits to the office to check on your progress. Be sure to keep these visits.  If you have stitches or staples, you will need to have them taken out. Your doctor will often want to do this in 1 to 2 weeks.  What drugs may be needed?  The doctor may order drugs to:  Help with pain and swelling  Prevent infection  Will physical activity be limited?  You may need to limit your activity for a few weeks until you fully recover. Talk to your doctor about the right amount of activity for you.  What changes to diet are needed?  You may have problems chewing and opening your mouth after the surgery.  Eat soft foods like soups and pureed fruits and  vegetables after surgery or until the swelling and pain goes away. Avoid citrus fruits. Avoid crunchy and hard foods like meat, popcorn, and crackers that need a lot of chewing.  What problems could happen?  Injury to the facial nerve  Numbness of the ear lobe  Infection  Bleeding  The skin over the parotid gland sweats when you eat  When do I need to call the doctor?  Signs of infection. These include a fever of 100.4°F (38°C) or higher, chills.  Signs of wound infection. These include swelling, redness, warmth around the wound; too much pain when touched; yellowish, greenish, or bloody discharge; foul smell coming from the cut site; cut site opens up.  Signs of facial nerve damage. These include not being able to wink, smile, or drink fluids.  Bleeding that soaks a gauze dressing in 10 minutes and keeps bleeding  Swelling in the neck that makes it hard to breathe or swallow  Teach Back: Helping You Understand  The Teach Back Method helps you understand the information we are giving you. After you talk with the staff, tell them in your own words what you learned. This helps to make sure the staff has described each thing clearly. It also helps to explain things that may have been confusing. Before going home, make sure you can do these:  I can tell you about my procedure.  I can tell you how to care for my cut site.  I can tell you what I will do if I have fever, redness, warmth around the wound, or problems when I wink, smile, or try to drink fluids  Last Reviewed Date  2021-07-26  Consumer Information Use and Disclaimer  This generalized information is a limited summary of diagnosis, treatment, and/or medication information. It is not meant to be comprehensive and should be used as a tool to help the user understand and/or assess potential diagnostic and treatment options. It does NOT include all information about conditions, treatments, medications, side effects, or risks that may apply to a specific patient. It  is not intended to be medical advice or a substitute for the medical advice, diagnosis, or treatment of a health care provider based on the health care provider's examination and assessment of a patient’s specific and unique circumstances. Patients must speak with a health care provider for complete information about their health, medical questions, and treatment options, including any risks or benefits regarding use of medications. This information does not endorse any treatments or medications as safe, effective, or approved for treating a specific patient. UpTo[employer], Inc. and its affiliates disclaim any warranty or liability relating to this information or the use thereof. The use of this information is governed by the Terms of Use, available at https://www.woltersThe Nature Conservancyuwer.com/en/know/clinical-effectiveness-terms  Copyright © 2024 [employer]ToDate, Inc. and its affiliates and/or licensors. All rights reserved.

## 2024-09-05 NOTE — OP NOTE
Parotidectomy- (L) Operative Note     Date: 2024  OR Location: [facility]    Name: Twyla Duran, : 1955, Age: 69 y.o., MRN: [id number], Sex: female    Diagnosis  Pre-op Diagnosis      * Parotid mass [K11.8] Post-op Diagnosis     * Parotid mass [K11.8]     Procedures  Parotidectomy-  05876 - ME EXC PRTD ISABEL/PRTD GLND LAT DSJ&PRSRV FACIAL NR  2. Excision of left neck mass submandibular region    Surgeons      * Uziel Nunn - Primary    Resident/Fellow/Other Assistant:  Surgeons and Role:  * No surgeons found with a matching role *    Procedure Summary  Anesthesia: General  ASA: III  Anesthesia Staff: Anesthesiologist: Juan Carlos Lawrence MD; Juan Ray MD  CRNA: ALEA Granados-CRNA, DNAP  Estimated Blood Loss: Minimal mL  Intra-op Medications:   Administrations occurring from 1145 to 1400 on 24:   Medication Name Total Dose   sodium chloride 0.9 % irrigation solution 1,000 mL   lidocaine-epinephrine (Xylocaine W/EPI) 1 %-1:100,000 injection 7 mL   lactated Ringer's infusion Cannot be calculated   ceFAZolin (Ancef) 2 g in dextrose (iso)  mL 2 g              Anesthesia Record               Intraprocedure I/O Totals          Intake    Remifentanil Drip 0.00 mL    The total shown is the total volume documented since Anesthesia Start was filed.    lactated Ringer's infusion 1000.00 mL    ceFAZolin (Ancef) 2 g in dextrose (iso)  mL 100.00 mL    Total Intake 1100 mL          Specimen:   ID Type Source Tests Collected by Time   1 : left superficial dissection Tissue SALIVARY GLAND RESECTION SURGICAL PATHOLOGY EXAM Uziel Nunn MD 2024 5568   2 : left submandibular neck mass Tissue SOFT TISSUE MASS BIOPSY SURGICAL PATHOLOGY EXAM Uziel Nunn MD 2024 1901        Staff:   Cinthiaulator: Carson Vicenteulator: Lalito Allison Person: Colleen         Drains and/or Catheters: * None in log *    Tourniquet Times:         Implants:     Findings: Clear identification of a prominent mass involving  the left parotid removed with cuff of normal tissue with secondary mass completely separate from the parotid dissected free from the submandibular space and sent as a separate specimen.      Indications: Twyla Duran is an 69 y.o. female who is having surgery for Parotid mass [K11.8].     The patient was seen in the preoperative area. The risks, benefits, complications, treatment options, non-operative alternatives, expected recovery and outcomes were discussed with the patient. The possibilities of reaction to medication, pulmonary aspiration, injury to surrounding structures, bleeding, recurrent infection, the need for additional procedures, failure to diagnose a condition, and creating a complication requiring transfusion or operation were discussed with the patient. The patient concurred with the proposed plan, giving informed consent.  The site of surgery was properly noted/marked if necessary per policy. The patient has been actively warmed in preoperative area. Preoperative antibiotics have been ordered and given within 1 hours of incision. Venous thrombosis prophylaxis have been ordered including bilateral sequential compression devices    Procedure Details:   The patient was taken to the operating room and administered general anesthesia.  The facial nerve monitor was applied in usual manner to facilitate intraoperative surveillance of the facial nerve function.  The patient had a modified Linus Brown incision line demarcated in the skin infiltrated with lidocaine 1%-epinephrine 1/100,000.  The patient had final prep drape and timeout performed.  The patient had incision carried down through skin and platysma the elevation of subplatysmal flap superiorly and elevating the periparotid flap directly overlying the parotid-masseteric fascia.  The patient had the flaps carried past the distance of the masses themselves.  The patient had incision carried down through the parotid gland just anterior to the  gland with dissection and careful preservation of the marginal mandibular branch of the facial nerve.  That nerve was dissected both distal and proximal to the mass itself.  That facilitated the border of the most anterior dissection of the parotid itself.  The patient had the nerve completely freed with clear identification and preservation of the retromandibular vein which was clearly attached to the mass as well.  That was freed under operating microscopy utilizing surgical loops.  The patient had the dissection of the mass taken with the cuff of normal parotid tissue working posteromedially.  The nerves were preserved in their entirety during this operation.  I took this across the sternocleidomastoid muscle with preservation of the branches of the greater regular nerve.  The entire mass in the cuff of normal parotid tissue was then removed and sent for permanent section.  Because of the findings on the CT scan suggestive of a separate neck mass in the submandibular space dissection was carried out after I palpated in this region and thought I might discern an irregularity.  Therefore following the nerve further anteriorly I was able to dissect out a submandibular mass which looks more like a lymph node than it does a concerning mass itself.  That mass was circumferentially dissected free with a small cuff of normal tissue attached to same.  That entire lesion was thus removed.  The nerve again was preserved throughout.  Upon completion of same the wound was gently irrigated with saline.  Any bleeding on the way was controlled with judicious use of bipolar cautery.  Because was no significant bleeding no drain was applied.  The patient had the fascia of the parotid gland attached posteriorly to the incised layer including the fascia overlying the sternocleidomastoid muscle.  This was followed inferiorly in a similar manner closing the fascia accordingly.  I did leave a small open area to facilitate drainage.   The skin was then closed first with buried subcutaneous followed by running subcuticular Vicryl with application of benzoin and Steri-Strip.  The patient was released and taken to recovery in stable condition.  In recovery facial nerve function intact      Complications:  None; patient tolerated the procedure well.    Disposition: PACU - hemodynamically stable.  Condition: stable         Additional Details:     Attending Attestation: I performed the procedure.    Uziel Nunn  Phone Number: [phone]

## 2024-09-06 ENCOUNTER — APPOINTMENT (OUTPATIENT)
Dept: OTOLARYNGOLOGY | Facility: CLINIC | Age: 69
End: 2024-09-06
Payer: MEDICARE

## 2024-09-09 ENCOUNTER — TELEPHONE (OUTPATIENT)
Dept: OTOLARYNGOLOGY | Facility: CLINIC | Age: 69
End: 2024-09-09
Payer: MEDICARE

## 2024-09-16 LAB
LABORATORY COMMENT REPORT: NORMAL
PATH REPORT.FINAL DX SPEC: NORMAL
PATH REPORT.GROSS SPEC: NORMAL
PATH REPORT.RELEVANT HX SPEC: NORMAL
PATH REPORT.TOTAL CANCER: NORMAL

## 2024-09-18 ENCOUNTER — HOSPITAL ENCOUNTER (OUTPATIENT)
Age: 69
Setting detail: SPECIMEN
Discharge: HOME | End: 2024-09-18
Payer: MEDICARE

## 2024-09-18 ENCOUNTER — OFFICE VISIT (OUTPATIENT)
Dept: FAMILY MEDICINE CLINIC | Age: 69
End: 2024-09-18
Payer: MEDICARE

## 2024-09-18 VITALS
OXYGEN SATURATION: 95 % | WEIGHT: 185.4 LBS | DIASTOLIC BLOOD PRESSURE: 78 MMHG | SYSTOLIC BLOOD PRESSURE: 138 MMHG | HEART RATE: 109 BPM | HEIGHT: 63 IN | BODY MASS INDEX: 32.85 KG/M2

## 2024-09-18 DIAGNOSIS — R39.9 LOWER URINARY TRACT SYMPTOMS (LUTS): Primary | ICD-10-CM

## 2024-09-18 DIAGNOSIS — Z79.4 TYPE 2 DIABETES MELLITUS WITHOUT COMPLICATION, WITH LONG-TERM CURRENT USE OF INSULIN (HCC): ICD-10-CM

## 2024-09-18 DIAGNOSIS — R39.9 LOWER URINARY TRACT SYMPTOMS (LUTS): ICD-10-CM

## 2024-09-18 DIAGNOSIS — E11.9 TYPE 2 DIABETES MELLITUS WITHOUT COMPLICATION, WITH LONG-TERM CURRENT USE OF INSULIN (HCC): ICD-10-CM

## 2024-09-18 LAB
APPEARANCE FLUID: ABNORMAL
BILIRUBIN, POC: ABNORMAL
BLOOD URINE, POC: ABNORMAL
CLARITY, POC: ABNORMAL
COLOR, POC: ABNORMAL
GLUCOSE URINE, POC: ABNORMAL MG/DL
KETONES, POC: ABNORMAL MG/DL
LEUKOCYTE EST, POC: ABNORMAL
NITRITE, POC: ABNORMAL
PH, POC: 7
PROTEIN, POC: ABNORMAL MG/DL
SPECIFIC GRAVITY, POC: 1.02
UROBILINOGEN, POC: ABNORMAL MG/DL

## 2024-09-18 PROCEDURE — 81002 URINALYSIS NONAUTO W/O SCOPE: CPT | Performed by: FAMILY MEDICINE

## 2024-09-18 PROCEDURE — 87077 CULTURE AEROBIC IDENTIFY: CPT

## 2024-09-18 PROCEDURE — 87186 SC STD MICRODIL/AGAR DIL: CPT

## 2024-09-18 PROCEDURE — 87086 URINE CULTURE/COLONY COUNT: CPT

## 2024-09-18 RX ORDER — NITROFURANTOIN 25; 75 MG/1; MG/1
100 CAPSULE ORAL 2 TIMES DAILY
Qty: 14 CAPSULE | Refills: 0 | Status: SHIPPED | OUTPATIENT
Start: 2024-09-18 | End: 2024-09-22

## 2024-09-18 ASSESSMENT — ENCOUNTER SYMPTOMS
COUGH: 0
ABDOMINAL PAIN: 0
SORE THROAT: 0
SHORTNESS OF BREATH: 0
CONSTIPATION: 0
WHEEZING: 0
RHINORRHEA: 0
DIARRHEA: 0

## 2024-09-20 ENCOUNTER — APPOINTMENT (OUTPATIENT)
Dept: OTOLARYNGOLOGY | Facility: CLINIC | Age: 69
End: 2024-09-20
Payer: MEDICARE

## 2024-09-20 DIAGNOSIS — R22.1 NECK MASS: ICD-10-CM

## 2024-09-20 DIAGNOSIS — K11.8 PAROTID MASS: Primary | ICD-10-CM

## 2024-09-20 LAB
BACTERIA UR CULT: ABNORMAL
BACTERIA UR CULT: ABNORMAL
ORGANISM: ABNORMAL

## 2024-09-20 PROCEDURE — 99024 POSTOP FOLLOW-UP VISIT: CPT | Performed by: OTOLARYNGOLOGY

## 2024-09-20 PROCEDURE — 4010F ACE/ARB THERAPY RXD/TAKEN: CPT | Performed by: OTOLARYNGOLOGY

## 2024-09-20 NOTE — PROGRESS NOTES
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  No worrisome findings otherwise notable on pathology.  Patient doing very well except having numbness of the ear as anticipated and expected.    Exam looks very good and Steri-Strips are removed and suture ends are trimmed.  Facial nerve function completely normal.    Assessment and plan:  Doing great fine left parotid surgery.  Continue with routine postop management and see me in 3 months, sooner with issue.  All questions were answered in this regard accordingly.

## 2024-09-22 DIAGNOSIS — R39.9 LOWER URINARY TRACT SYMPTOMS (LUTS): Primary | ICD-10-CM

## 2024-09-22 RX ORDER — SULFAMETHOXAZOLE/TRIMETHOPRIM 800-160 MG
1 TABLET ORAL 2 TIMES DAILY
Qty: 10 TABLET | Refills: 0 | Status: SHIPPED | OUTPATIENT
Start: 2024-09-22 | End: 2024-09-27

## 2024-10-01 ENCOUNTER — OFFICE VISIT (OUTPATIENT)
Dept: FAMILY MEDICINE CLINIC | Age: 69
End: 2024-10-01
Payer: MEDICARE

## 2024-10-01 ENCOUNTER — HOSPITAL ENCOUNTER (OUTPATIENT)
Age: 69
Setting detail: SPECIMEN
Discharge: HOME | End: 2024-10-01
Payer: MEDICARE

## 2024-10-01 VITALS
SYSTOLIC BLOOD PRESSURE: 114 MMHG | BODY MASS INDEX: 32.92 KG/M2 | DIASTOLIC BLOOD PRESSURE: 60 MMHG | WEIGHT: 185.8 LBS | OXYGEN SATURATION: 95 % | HEART RATE: 97 BPM | HEIGHT: 63 IN

## 2024-10-01 DIAGNOSIS — N39.0 URINARY TRACT INFECTION WITHOUT HEMATURIA, SITE UNSPECIFIED: ICD-10-CM

## 2024-10-01 DIAGNOSIS — N39.0 URINARY TRACT INFECTION WITHOUT HEMATURIA, SITE UNSPECIFIED: Primary | ICD-10-CM

## 2024-10-01 DIAGNOSIS — R39.9 LOWER URINARY TRACT SYMPTOMS (LUTS): ICD-10-CM

## 2024-10-01 DIAGNOSIS — E11.9 TYPE 2 DIABETES MELLITUS WITHOUT COMPLICATION, WITH LONG-TERM CURRENT USE OF INSULIN (HCC): ICD-10-CM

## 2024-10-01 DIAGNOSIS — Z79.4 TYPE 2 DIABETES MELLITUS WITHOUT COMPLICATION, WITH LONG-TERM CURRENT USE OF INSULIN (HCC): ICD-10-CM

## 2024-10-01 LAB
APPEARANCE FLUID: CLEAR
BILIRUBIN, POC: ABNORMAL
BLOOD URINE, POC: ABNORMAL
CLARITY, POC: CLEAR
COLOR, POC: YELLOW
CREAT UR-MCNC: 19.9 MG/DL
GLUCOSE URINE, POC: ABNORMAL MG/DL
KETONES, POC: ABNORMAL MG/DL
LEUKOCYTE EST, POC: ABNORMAL
MICROALBUMIN UR-MCNC: <1.2 MG/DL
MICROALBUMIN/CREAT UR-RTO: NORMAL MG/G (ref 0–30)
NITRITE, POC: ABNORMAL
PH, POC: 6
PROTEIN, POC: ABNORMAL MG/DL
SPECIFIC GRAVITY, POC: 1
UROBILINOGEN, POC: ABNORMAL MG/DL

## 2024-10-01 PROCEDURE — 87086 URINE CULTURE/COLONY COUNT: CPT

## 2024-10-01 PROCEDURE — 3074F SYST BP LT 130 MM HG: CPT | Performed by: FAMILY MEDICINE

## 2024-10-01 PROCEDURE — 3051F HG A1C>EQUAL 7.0%<8.0%: CPT | Performed by: FAMILY MEDICINE

## 2024-10-01 PROCEDURE — 99213 OFFICE O/P EST LOW 20 MIN: CPT | Performed by: FAMILY MEDICINE

## 2024-10-01 PROCEDURE — 81002 URINALYSIS NONAUTO W/O SCOPE: CPT | Performed by: FAMILY MEDICINE

## 2024-10-01 PROCEDURE — 82570 ASSAY OF URINE CREATININE: CPT

## 2024-10-01 PROCEDURE — 1123F ACP DISCUSS/DSCN MKR DOCD: CPT | Performed by: FAMILY MEDICINE

## 2024-10-01 PROCEDURE — 82043 UR ALBUMIN QUANTITATIVE: CPT

## 2024-10-01 PROCEDURE — 3078F DIAST BP <80 MM HG: CPT | Performed by: FAMILY MEDICINE

## 2024-10-01 RX ORDER — SULFAMETHOXAZOLE/TRIMETHOPRIM 800-160 MG
1 TABLET ORAL 2 TIMES DAILY
Qty: 10 TABLET | Refills: 0 | Status: SHIPPED | OUTPATIENT
Start: 2024-10-01 | End: 2024-10-06

## 2024-10-01 ASSESSMENT — ENCOUNTER SYMPTOMS
SHORTNESS OF BREATH: 0
RHINORRHEA: 0
CONSTIPATION: 0
SORE THROAT: 0
WHEEZING: 0
ABDOMINAL PAIN: 0
DIARRHEA: 0
COUGH: 0

## 2024-10-01 NOTE — PROGRESS NOTES
placement 2010 & replacement 2016    [place] IMPLTJ/RPLCMT ITHCL/EDRL DRUG NFS PRGRBL PUMP N/A 05/29/2019    REMOVAL OF INTRATHECAL PUMP RESERVOIR performed by Shakir Brooks MD at [facility]    SKIN BIOPSY  03/2022    on a cyst on neck(benign)    TONSILLECTOMY      age 27    UPPER GASTROINTESTINAL ENDOSCOPY N/A 1/11/2024    EGD DIAGNOSTIC ONLY performed by Suzette Hi MD at [facility]     Social History     Socioeconomic History    Marital status:      Spouse name: Not on file    Number of children: Not on file    Years of education: Not on file    Highest education level: Not on file   Occupational History    Not on file   Tobacco Use    Smoking status: Every Day     Current packs/day: 1.00     Average packs/day: 1 pack/day for 49.7 years (49.7 ttl pk-yrs)     Types: Cigarettes     Start date: 1975    Smokeless tobacco: Never   Substance and Sexual Activity    Alcohol use: No    Drug use: No    Sexual activity: Not on file   Other Topics Concern    Not on file   Social History Narrative    Not on file     Social Determinants of Health     Financial Resource Strain: Low Risk  (8/15/2024)    Overall Financial Resource Strain (CARDIA)     Difficulty of Paying Living Expenses: Not hard at all   Food Insecurity: No Food Insecurity (8/15/2024)    Hunger Vital Sign     Worried About Running Out of Food in the Last Year: Never true     Ran Out of Food in the Last Year: Never true   Transportation Needs: Unknown (8/15/2024)    PRAPARE - Transportation     Lack of Transportation (Medical): Not on file     Lack of Transportation (Non-Medical): No   Physical Activity: Inactive (12/5/2023)    Exercise Vital Sign     Days of Exercise per Week: 0 days     Minutes of Exercise per Session: 0 min   Stress: Not on file   Social Connections: Not on file   Intimate Partner Violence: Not on file   Housing Stability: Unknown (8/15/2024)    Housing Stability Vital Sign     Unable to Pay for Housing in the Last Year: Not on

## 2024-10-03 LAB — BACTERIA UR CULT: NORMAL

## 2024-11-05 ENCOUNTER — OFFICE VISIT (OUTPATIENT)
Dept: FAMILY MEDICINE CLINIC | Age: 69
End: 2024-11-05

## 2024-11-05 VITALS
OXYGEN SATURATION: 94 % | DIASTOLIC BLOOD PRESSURE: 80 MMHG | WEIGHT: 190.6 LBS | SYSTOLIC BLOOD PRESSURE: 134 MMHG | HEART RATE: 84 BPM | BODY MASS INDEX: 33.77 KG/M2 | HEIGHT: 63 IN

## 2024-11-05 DIAGNOSIS — F51.04 PSYCHOPHYSIOLOGICAL INSOMNIA: ICD-10-CM

## 2024-11-05 DIAGNOSIS — Z79.4 TYPE 2 DIABETES MELLITUS WITHOUT COMPLICATION, WITH LONG-TERM CURRENT USE OF INSULIN (HCC): ICD-10-CM

## 2024-11-05 DIAGNOSIS — Z23 ENCOUNTER FOR IMMUNIZATION: Primary | ICD-10-CM

## 2024-11-05 DIAGNOSIS — E66.811 CLASS 1 OBESITY WITH SERIOUS COMORBIDITY AND BODY MASS INDEX (BMI) OF 33.0 TO 33.9 IN ADULT, UNSPECIFIED OBESITY TYPE: ICD-10-CM

## 2024-11-05 DIAGNOSIS — E11.9 TYPE 2 DIABETES MELLITUS WITHOUT COMPLICATION, WITH LONG-TERM CURRENT USE OF INSULIN (HCC): ICD-10-CM

## 2024-11-05 RX ORDER — GLUCOSAMINE HCL/CHONDROITIN SU 500-400 MG
CAPSULE ORAL
Qty: 100 STRIP | Refills: 11 | Status: SHIPPED | OUTPATIENT
Start: 2024-11-05

## 2024-11-05 RX ORDER — SYRING-NEEDL,DISP,INSUL,0.3 ML 30 GX5/16"
1 SYRINGE, EMPTY DISPOSABLE MISCELLANEOUS 3 TIMES DAILY
Qty: 1 EACH | Refills: 0 | Status: SHIPPED | OUTPATIENT
Start: 2024-11-05

## 2024-11-05 RX ORDER — LANCETS 30 GAUGE
1 EACH MISCELLANEOUS 3 TIMES DAILY
Qty: 100 EACH | Refills: 11 | Status: SHIPPED | OUTPATIENT
Start: 2024-11-05

## 2024-11-05 RX ORDER — SEMAGLUTIDE 1.34 MG/ML
0.25 INJECTION, SOLUTION SUBCUTANEOUS WEEKLY
Qty: 2 ADJUSTABLE DOSE PRE-FILLED PEN SYRINGE | Refills: 1 | Status: SHIPPED | OUTPATIENT
Start: 2024-11-05

## 2024-11-05 RX ORDER — TRAZODONE HYDROCHLORIDE 50 MG/1
50 TABLET, FILM COATED ORAL NIGHTLY PRN
Qty: 30 TABLET | Refills: 2 | Status: SHIPPED | OUTPATIENT
Start: 2024-11-05

## 2024-11-05 ASSESSMENT — ENCOUNTER SYMPTOMS
WHEEZING: 0
COUGH: 0
SHORTNESS OF BREATH: 0
SORE THROAT: 0
RHINORRHEA: 0
ABDOMINAL PAIN: 0
DIARRHEA: 0
CONSTIPATION: 0

## 2024-11-05 NOTE — PROGRESS NOTES
Vaccine Information Sheet, \"Influenza - Inactivated\" OR \"Live - Intranasal\"  given to Tracy Kenyon, or parent/legal guardian of  Tracy Kenyon and verbalized understanding.    Patient responses:    Have you ever had a reaction to a flu vaccine? No  Are you able to eat eggs without adverse effects?  Yes  Do you have any current illness?  No  Have you ever had Guillian [place] Syndrome?  No    Flu vaccine given per order. Please see immunization tab.    
the skin once a week 2 Adjustable Dose Pre-filled Pen Syringe 1    traZODone (DESYREL) 50 MG tablet Take 1 tablet by mouth nightly as needed for Sleep 30 tablet 2    Misc Natural Products (JOINT HEALTH PO) Take by mouth      Bioflavonoid Products (BIOFLEX PO) Take by mouth      insulin glargine (LANTUS SOLOSTAR) 100 UNIT/ML injection pen Inject 24 Units into the skin 2 times daily 60 mL 2    simvastatin (ZOCOR) 40 MG tablet TAKE 1 TABLET BY MOUTH AT NIGHT 90 tablet 2    clopidogrel (PLAVIX) 75 MG tablet Take 1 tablet by mouth daily 90 tablet 3    celecoxib (CELEBREX) 200 MG capsule Take 1 capsule by mouth 2 times daily      omeprazole (PRILOSEC) 40 MG delayed release capsule Take 1 capsule by mouth daily 90 capsule 3    Insulin Pen Needle (ULTICARE MINI PEN NEEDLES) 31G X 6 MM MISC USE 2 PEN NEEDLES DAILY AS  DIRECTED 160 each 0    acetaminophen-codeine (TYLENOL #4) 300-60 MG per tablet Take 1 tablet by mouth 2 times daily. As needed      venlafaxine (EFFEXOR XR) 150 MG extended release capsule TAKE 1 CAPSULE BY MOUTH DAILY 90 capsule 3    lisinopril (PRINIVIL;ZESTRIL) 10 MG tablet TAKE 1 TABLET BY MOUTH AT NIGHT 90 tablet 3    Misc. Devices KIT 1 kit by Does not apply route daily as needed Abdominal binder      acetaminophen (TYLENOL) 500 MG tablet Take 2 tablets by mouth as needed for Pain       No current facility-administered medications for this visit.

## 2024-11-11 ENCOUNTER — OFFICE VISIT (OUTPATIENT)
Dept: CARDIOLOGY CLINIC | Age: 69
End: 2024-11-11

## 2024-11-11 VITALS
SYSTOLIC BLOOD PRESSURE: 108 MMHG | BODY MASS INDEX: 33.37 KG/M2 | OXYGEN SATURATION: 94 % | WEIGHT: 188.4 LBS | DIASTOLIC BLOOD PRESSURE: 62 MMHG | HEART RATE: 84 BPM

## 2024-11-11 DIAGNOSIS — G45.9 TIA (TRANSIENT ISCHEMIC ATTACK): Primary | Chronic | ICD-10-CM

## 2024-11-11 DIAGNOSIS — G90.A POTS (POSTURAL ORTHOSTATIC TACHYCARDIA SYNDROME): ICD-10-CM

## 2024-11-11 DIAGNOSIS — I10 PRIMARY HYPERTENSION: ICD-10-CM

## 2024-11-11 RX ORDER — BLOOD-GLUCOSE METER
EACH MISCELLANEOUS
COMMUNITY
Start: 2024-11-05

## 2024-11-11 RX ORDER — NYSTATIN 100000 [USP'U]/ML
SUSPENSION ORAL
COMMUNITY
Start: 2024-10-01

## 2024-11-11 RX ORDER — SEMAGLUTIDE 0.68 MG/ML
INJECTION, SOLUTION SUBCUTANEOUS
COMMUNITY
Start: 2024-11-05 | End: 2024-11-11 | Stop reason: SDUPTHER

## 2024-11-11 ASSESSMENT — ENCOUNTER SYMPTOMS
RESPIRATORY NEGATIVE: 1
WHEEZING: 0
STRIDOR: 0
GASTROINTESTINAL NEGATIVE: 1
CHEST TIGHTNESS: 0
BLOOD IN STOOL: 0
COUGH: 0
EYES NEGATIVE: 1
SHORTNESS OF BREATH: 0
NAUSEA: 0

## 2024-11-11 NOTE — PROGRESS NOTES
General: No tenderness or edema. Normal range of motion.      Cervical back: Normal range of motion and neck supple.   Neurological: She is alert and oriented to person, place, and time.   Skin: Skin is warm. No cyanosis. Nails show no clubbing.       LABS:  CBC:   Lab Results   Component Value Date/Time    WBC 15.3 01/03/2024 06:41 PM    RBC 4.52 01/03/2024 06:41 PM    HGB 13.1 01/03/2024 06:41 PM    HCT 42.3 01/03/2024 06:41 PM    MCV 93.6 01/03/2024 06:41 PM    MCH 29.0 01/03/2024 06:41 PM    MCHC 31.0 01/03/2024 06:41 PM    RDW 17.9 01/03/2024 06:41 PM     01/03/2024 06:41 PM    MPV 9.8 12/03/2018 12:00 AM     Lipids:  Lab Results   Component Value Date    CHOL 155 04/05/2021    CHOL 150 11/06/2018    CHOL 147 01/26/2017     Lab Results   Component Value Date    TRIG 139 04/05/2021    TRIG 151 11/06/2018    TRIG 119 01/26/2017     Lab Results   Component Value Date    HDL 42 12/05/2023    HDL 58 07/15/2022    HDL 40 04/05/2021     No components found for: \"LDLCHOLESTEROL\", \"LDLCALC\"    Lab Results   Component Value Date    VLDL 24 01/26/2017     Lab Results   Component Value Date    CHOLHDLRATIO 4.20 01/26/2017     CMP:    Lab Results   Component Value Date/Time     12/26/2023 03:50 AM    K 4.2 12/26/2023 03:50 AM    K 4.7 12/25/2023 02:44 PM     12/26/2023 03:50 AM    CO2 29 12/26/2023 03:50 AM    BUN 8 12/26/2023 03:50 AM    CREATININE 0.72 12/26/2023 03:50 AM    GFRAA >60.0 07/15/2022 06:28 PM    LABGLOM >60.0 12/26/2023 03:50 AM    GLUCOSE 106 12/26/2023 03:50 AM    CALCIUM 8.3 12/26/2023 03:50 AM    BILITOT <0.2 12/26/2023 03:50 AM    ALKPHOS 69 12/26/2023 03:50 AM    AST 9 12/26/2023 03:50 AM    ALT 6 12/26/2023 03:50 AM     BMP:    Lab Results   Component Value Date/Time     12/26/2023 03:50 AM    K 4.2 12/26/2023 03:50 AM    K 4.7 12/25/2023 02:44 PM     12/26/2023 03:50 AM    CO2 29 12/26/2023 03:50 AM    BUN 8 12/26/2023 03:50 AM    CREATININE 0.72 12/26/2023 03:50

## 2024-11-21 DIAGNOSIS — E11.9 DIABETES MELLITUS TYPE 2 WITHOUT RETINOPATHY (HCC): ICD-10-CM

## 2024-11-21 RX ORDER — FLURBIPROFEN SODIUM 0.3 MG/ML
SOLUTION/ DROPS OPHTHALMIC
Qty: 160 EACH | Refills: 0 | Status: SHIPPED | OUTPATIENT
Start: 2024-11-21

## 2024-11-21 NOTE — TELEPHONE ENCOUNTER
Comments:     Last Office Visit (last PCP visit):   11/5/2024    Next Visit Date:  Future Appointments   Date Time Provider Department Center   12/4/2024  8:00 AM Johnnie Livingston MD [facility]   5/19/2025  1:45 PM Jonas Flynn MD [facility]       **If hasn't been seen in over a year OR hasn't followed up according to last diabetes/ADHD visit, make appointment for patient before sending refill to provider.    Rx requested:  Requested Prescriptions     Pending Prescriptions Disp Refills    ULTICARE MINI PEN NEEDLES 31G X 6 MM MISC [Pharmacy Med Name: [id number]] 160 each 0     Sig: USE 2 PEN NEEDLES DAILY AS  DIRECTED

## 2024-12-04 ENCOUNTER — HOSPITAL ENCOUNTER (OUTPATIENT)
Age: 69
Setting detail: SPECIMEN
Discharge: HOME | End: 2024-12-04
Payer: MEDICARE

## 2024-12-04 ENCOUNTER — OFFICE VISIT (OUTPATIENT)
Dept: FAMILY MEDICINE CLINIC | Age: 69
End: 2024-12-04
Payer: MEDICARE

## 2024-12-04 VITALS
DIASTOLIC BLOOD PRESSURE: 86 MMHG | SYSTOLIC BLOOD PRESSURE: 124 MMHG | BODY MASS INDEX: 32.07 KG/M2 | HEART RATE: 91 BPM | OXYGEN SATURATION: 95 % | HEIGHT: 63 IN | WEIGHT: 181 LBS

## 2024-12-04 DIAGNOSIS — E11.9 TYPE 2 DIABETES MELLITUS WITHOUT COMPLICATION, WITH LONG-TERM CURRENT USE OF INSULIN (HCC): ICD-10-CM

## 2024-12-04 DIAGNOSIS — Z82.0 FAMILY HISTORY OF ALZHEIMER DISEASE: ICD-10-CM

## 2024-12-04 DIAGNOSIS — Z00.00 MEDICARE ANNUAL WELLNESS VISIT, SUBSEQUENT: Primary | ICD-10-CM

## 2024-12-04 DIAGNOSIS — Z79.4 TYPE 2 DIABETES MELLITUS WITHOUT COMPLICATION, WITH LONG-TERM CURRENT USE OF INSULIN (HCC): ICD-10-CM

## 2024-12-04 DIAGNOSIS — Z00.00 MEDICARE ANNUAL WELLNESS VISIT, SUBSEQUENT: ICD-10-CM

## 2024-12-04 LAB
ALBUMIN SERPL-MCNC: 3.9 G/DL (ref 3.5–4.6)
ALP SERPL-CCNC: 86 U/L (ref 40–130)
ALT SERPL-CCNC: <5 U/L (ref 0–33)
ANION GAP SERPL CALCULATED.3IONS-SCNC: 9 MEQ/L (ref 9–15)
AST SERPL-CCNC: 11 U/L (ref 0–35)
BILIRUB SERPL-MCNC: <0.2 MG/DL (ref 0.2–0.7)
BUN SERPL-MCNC: 10 MG/DL (ref 8–23)
CALCIUM SERPL-MCNC: 9 MG/DL (ref 8.5–9.9)
CHLORIDE SERPL-SCNC: 104 MEQ/L (ref 95–107)
CHOLEST SERPL-MCNC: 146 MG/DL (ref 0–199)
CO2 SERPL-SCNC: 29 MEQ/L (ref 20–31)
CREAT SERPL-MCNC: 0.86 MG/DL (ref 0.5–0.9)
GLOBULIN SER CALC-MCNC: 2.8 G/DL (ref 2.3–3.5)
GLUCOSE FASTING: 73 MG/DL (ref 70–99)
HBA1C MFR BLD: 6.2 %
HDLC SERPL-MCNC: 50 MG/DL (ref 40–59)
LDL CHOLESTEROL: 77 MG/DL (ref 0–129)
POTASSIUM SERPL-SCNC: 4.6 MEQ/L (ref 3.4–4.9)
PROT SERPL-MCNC: 6.7 G/DL (ref 6.3–8)
SODIUM SERPL-SCNC: 142 MEQ/L (ref 135–144)
TRIGLYCERIDE, FASTING: 97 MG/DL (ref 0–150)

## 2024-12-04 PROCEDURE — 80061 LIPID PANEL: CPT

## 2024-12-04 PROCEDURE — 80053 COMPREHEN METABOLIC PANEL: CPT

## 2024-12-04 PROCEDURE — 83036 HEMOGLOBIN GLYCOSYLATED A1C: CPT | Performed by: FAMILY MEDICINE

## 2024-12-04 ASSESSMENT — PATIENT HEALTH QUESTIONNAIRE - PHQ9
6. FEELING BAD ABOUT YOURSELF - OR THAT YOU ARE A FAILURE OR HAVE LET YOURSELF OR YOUR FAMILY DOWN: NOT AT ALL
5. POOR APPETITE OR OVEREATING: NOT AT ALL
4. FEELING TIRED OR HAVING LITTLE ENERGY: NOT AT ALL
SUM OF ALL RESPONSES TO PHQ QUESTIONS 1-9: 6
7. TROUBLE CONCENTRATING ON THINGS, SUCH AS READING THE NEWSPAPER OR WATCHING TELEVISION: NOT AT ALL
9. THOUGHTS THAT YOU WOULD BE BETTER OFF DEAD, OR OF HURTING YOURSELF: NOT AT ALL
10. IF YOU CHECKED OFF ANY PROBLEMS, HOW DIFFICULT HAVE THESE PROBLEMS MADE IT FOR YOU TO DO YOUR WORK, TAKE CARE OF THINGS AT HOME, OR GET ALONG WITH OTHER PEOPLE: NOT DIFFICULT AT ALL
SUM OF ALL RESPONSES TO PHQ9 QUESTIONS 1 & 2: 2
2. FEELING DOWN, DEPRESSED OR HOPELESS: MORE THAN HALF THE DAYS
3. TROUBLE FALLING OR STAYING ASLEEP: SEVERAL DAYS
8. MOVING OR SPEAKING SO SLOWLY THAT OTHER PEOPLE COULD HAVE NOTICED. OR THE OPPOSITE, BEING SO FIGETY OR RESTLESS THAT YOU HAVE BEEN MOVING AROUND A LOT MORE THAN USUAL: NEARLY EVERY DAY
SUM OF ALL RESPONSES TO PHQ QUESTIONS 1-9: 6
1. LITTLE INTEREST OR PLEASURE IN DOING THINGS: NOT AT ALL

## 2024-12-04 ASSESSMENT — LIFESTYLE VARIABLES
HOW OFTEN DO YOU HAVE A DRINK CONTAINING ALCOHOL: NEVER
HOW MANY STANDARD DRINKS CONTAINING ALCOHOL DO YOU HAVE ON A TYPICAL DAY: PATIENT DOES NOT DRINK

## 2024-12-04 NOTE — PROGRESS NOTES
cognitive decline is apparent; she herself still functions quite well and I don't have reason to suspect cognitive decline  We discussed that the same things we do to prevent ASCVD complications is likely preventative for dementia. She has controlled the sugar, lost 9 lbs since last visit, and quit smoking. She is making excellent lifestyle changes on her own        ICD-10-CM    1. Medicare annual wellness visit, subsequent  Z00.00 Lipid, Fasting     Comprehensive Metabolic Panel, Fasting      2. Type 2 diabetes mellitus without complication, with long-term current use of insulin (HCC)  E11.9 POCT glycosylated hemoglobin (Hb A1C)    Z79.4 Diabetic Foot Exam      3. Family history of Alzheimer disease  Z82.0           Return in about 6 months (around 6/4/2025) for DM2.    Johnnie Livingston MD      -----------------------------------------------------------------------------      Objective     Physical Exam:  Physical Exam  Vitals reviewed.   Constitutional:       General: She is not in acute distress.     Appearance: She is well-developed.   HENT:      Head: Normocephalic and atraumatic.      Right Ear: Tympanic membrane, ear canal and external ear normal. Tympanic membrane is not erythematous. Tympanic membrane has normal mobility.      Left Ear: Tympanic membrane, ear canal and external ear normal. Tympanic membrane is not erythematous. Tympanic membrane has normal mobility.      Nose: Nose normal.      Mouth/Throat:      Pharynx: No oropharyngeal exudate.   Neck:      Thyroid: No thyromegaly.   Cardiovascular:      Rate and Rhythm: Normal rate and regular rhythm.      Heart sounds: Normal heart sounds. No murmur heard.  Pulmonary:      Effort: Pulmonary effort is normal. No respiratory distress.      Breath sounds: Normal breath sounds. No wheezing.   Abdominal:      General: Bowel sounds are normal. There is no distension.      Palpations: Abdomen is soft.      Tenderness: There is no abdominal tenderness. There is

## 2024-12-06 ENCOUNTER — APPOINTMENT (OUTPATIENT)
Dept: OTOLARYNGOLOGY | Facility: CLINIC | Age: 69
End: 2024-12-06
Payer: MEDICARE

## 2024-12-06 DIAGNOSIS — K11.8 PAROTID MASS: Primary | ICD-10-CM

## 2024-12-06 PROCEDURE — 99212 OFFICE O/P EST SF 10 MIN: CPT | Performed by: OTOLARYNGOLOGY

## 2024-12-06 PROCEDURE — 1159F MED LIST DOCD IN RCRD: CPT | Performed by: OTOLARYNGOLOGY

## 2024-12-06 PROCEDURE — 1160F RVW MEDS BY RX/DR IN RCRD: CPT | Performed by: OTOLARYNGOLOGY

## 2024-12-06 PROCEDURE — 4010F ACE/ARB THERAPY RXD/TAKEN: CPT | Performed by: OTOLARYNGOLOGY

## 2024-12-06 NOTE — PROGRESS NOTES
Patient returns.  Seeing her back today surveillance recheck history of previous left parotid surgery.  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  Doing very well.  Having no worrisome issues.  Remaining head neck inquiry otherwise clear.  No other change in past medical surgical history.    Exam:  No acute distress.  The external ear structures appear normal. The ear canals patent and the tympanic membranes are intact without evidence of air-fluid levels, retraction, or congenital defects.  Anterior rhinoscopy notes essentially a midline nasal septum. Examination is noted for normal healthy mucosal membranes without any evidence of lesions, polyps, or exudate. The tongue is normally mobile. There are no lesions on the gingiva, buccal, or oral mucosa. There are no oral cavity masses.  The neck is negative for mass lymphadenopathy. The trachea and parotid are clear. The thyroid bed is grossly unremarkable. The salivary gland structures are grossly unremarkable.    Assessment and plan:  Doing great following parotids/neck surgery.  Currently without evidence of recurrent mass etc.  Favor observation.  Recheck 6 months, sooner with issue.  All questions were answered in this regard accordingly.

## 2024-12-08 DIAGNOSIS — I10 PRIMARY HYPERTENSION: ICD-10-CM

## 2024-12-08 RX ORDER — LISINOPRIL 10 MG/1
TABLET ORAL
Qty: 90 TABLET | Refills: 2 | Status: SHIPPED | OUTPATIENT
Start: 2024-12-08

## 2025-01-07 ENCOUNTER — TELEPHONE (OUTPATIENT)
Dept: FAMILY MEDICINE CLINIC | Age: 70
End: 2025-01-07

## 2025-01-07 NOTE — TELEPHONE ENCOUNTER
How low are the morning sugars? Was it just once or happening regularly?  My notes show you taking 24 units of lantus twice daily. If so, I recommend taking 40 units in the morning only and stopping the the nighttime lantus to help prevent the low morning sugars.  We should wait on adjusting the ozempic while we adjust the lantus.

## 2025-01-07 NOTE — TELEPHONE ENCOUNTER
Pt wants to know if you can lower her nighttime dose of the lantus, says her sugars are low in the morning when she wakes up but they are fine in the afternoon. She also wants to know if you can raise ozempic to 2.5.

## 2025-01-08 NOTE — TELEPHONE ENCOUNTER
Patient notes her morning reading has been as low as 45 and averages mid 60's and lower. States her overall average in the last 30 days is 80. Advised patient about taking 40 units in the morning and stopping the nighttime dose. Recommended she keep in touch with how it is going with the change.

## 2025-01-10 DIAGNOSIS — F51.04 PSYCHOPHYSIOLOGICAL INSOMNIA: ICD-10-CM

## 2025-01-10 RX ORDER — TRAZODONE HYDROCHLORIDE 50 MG/1
50 TABLET, FILM COATED ORAL DAILY PRN
Qty: 30 TABLET | Refills: 2 | Status: SHIPPED | OUTPATIENT
Start: 2025-01-10

## 2025-01-10 NOTE — TELEPHONE ENCOUNTER
Comments:     Last Office Visit (last PCP visit):   12/4/2024    Next Visit Date:  Future Appointments   Date Time Provider Department Center   5/19/2025  1:45 PM Jonas Flynn MD [facility]   6/4/2025  8:00 AM Johnnie Livingston MD [facility]       **If hasn't been seen in over a year OR hasn't followed up according to last diabetes/ADHD visit, make appointment for patient before sending refill to provider.    Rx requested:  Requested Prescriptions     Pending Prescriptions Disp Refills    traZODone (DESYREL) 50 MG tablet [Pharmacy Med Name: TRAZODONE 50 MG TABLET] 30 tablet 2     Sig: TAKE 1 TABLET BY MOUTH EVERY DAY AT BEDTIME AS NEEDED FOR SLEEP

## 2025-01-14 NOTE — TELEPHONE ENCOUNTER
Fasting sugars in the mornings in the 70's, 74 this morning. States she has not felt any symptoms since changing the medication.

## 2025-01-14 NOTE — TELEPHONE ENCOUNTER
I think you can lower the lantus to 36u once in the morning. I think this should get your morning sugar in a comfortable range. If having issues, let me know. We can talk again at your next diabetes visit.

## 2025-01-22 DIAGNOSIS — Z79.4 TYPE 2 DIABETES MELLITUS WITHOUT COMPLICATION, WITH LONG-TERM CURRENT USE OF INSULIN (HCC): ICD-10-CM

## 2025-01-22 DIAGNOSIS — E11.9 TYPE 2 DIABETES MELLITUS WITHOUT COMPLICATION, WITH LONG-TERM CURRENT USE OF INSULIN (HCC): ICD-10-CM

## 2025-01-22 DIAGNOSIS — E66.811 CLASS 1 OBESITY WITH SERIOUS COMORBIDITY AND BODY MASS INDEX (BMI) OF 33.0 TO 33.9 IN ADULT, UNSPECIFIED OBESITY TYPE: ICD-10-CM

## 2025-01-22 RX ORDER — SEMAGLUTIDE 1.34 MG/ML
0.25 INJECTION, SOLUTION SUBCUTANEOUS WEEKLY
Qty: 2 ADJUSTABLE DOSE PRE-FILLED PEN SYRINGE | Refills: 1 | Status: SHIPPED | OUTPATIENT
Start: 2025-01-22

## 2025-01-22 NOTE — TELEPHONE ENCOUNTER
Since we are using this for diabetes we'll need to wait until our next diabetes visit before adjusting.
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
## 2025-01-22 NOTE — TELEPHONE ENCOUNTER
Patient aware. States she will be going to [place] for the next few months, when she returns she states if she has a lot of weight gain, she may call to come in early for her DM follow up.

## 2025-02-04 DIAGNOSIS — E11.9 TYPE 2 DIABETES MELLITUS WITHOUT COMPLICATION, WITH LONG-TERM CURRENT USE OF INSULIN (HCC): ICD-10-CM

## 2025-02-04 DIAGNOSIS — Z79.4 TYPE 2 DIABETES MELLITUS WITHOUT COMPLICATION, WITH LONG-TERM CURRENT USE OF INSULIN (HCC): ICD-10-CM

## 2025-02-04 RX ORDER — SIMVASTATIN 40 MG
TABLET ORAL
Qty: 90 TABLET | Refills: 2 | Status: SHIPPED | OUTPATIENT
Start: 2025-02-04

## 2025-02-14 DIAGNOSIS — I10 PRIMARY HYPERTENSION: ICD-10-CM

## 2025-02-14 RX ORDER — LISINOPRIL 10 MG/1
10 TABLET ORAL NIGHTLY
Qty: 90 TABLET | Refills: 2 | Status: SHIPPED | OUTPATIENT
Start: 2025-02-14

## 2025-02-14 NOTE — TELEPHONE ENCOUNTER
Requested Prescriptions     Pending Prescriptions Disp Refills    lisinopril (PRINIVIL;ZESTRIL) 10 MG tablet 90 tablet 2     Sig: Take 1 tablet by mouth nightly

## 2025-03-06 ENCOUNTER — TELEPHONE (OUTPATIENT)
Dept: FAMILY MEDICINE CLINIC | Age: 70
End: 2025-03-06

## 2025-03-06 NOTE — TELEPHONE ENCOUNTER
Deepthi from [facility] CT department called requesting a new order for CT Lung Screen because the existing one in the system is

## 2025-03-07 DIAGNOSIS — K21.9 GASTROESOPHAGEAL REFLUX DISEASE, UNSPECIFIED WHETHER ESOPHAGITIS PRESENT: ICD-10-CM

## 2025-03-07 RX ORDER — OMEPRAZOLE 40 MG/1
40 CAPSULE, DELAYED RELEASE ORAL DAILY
Qty: 90 CAPSULE | Refills: 2 | Status: SHIPPED | OUTPATIENT
Start: 2025-03-07

## 2025-03-07 NOTE — TELEPHONE ENCOUNTER
Comments:     Last Office Visit (last PCP visit):   12/4/2024    Next Visit Date:  Future Appointments   Date Time Provider Department Center   5/19/2025  1:45 PM Jonas Flynn MD [facility]   6/4/2025  8:00 AM Johnnie Livingston MD [facility] DE[facility]       **If hasn't been seen in over a year OR hasn't followed up according to last diabetes/ADHD visit, make appointment for patient before sending refill to provider.    Rx requested:  Requested Prescriptions     Pending Prescriptions Disp Refills    omeprazole (PRILOSEC) 40 MG delayed release capsule [Pharmacy Med Name: Omeprazole 40 MG Oral Capsule Delayed Release] 100 capsule 2     Sig: TAKE 1 CAPSULE BY MOUTH DAILY

## 2025-03-26 DIAGNOSIS — F32.89 OTHER DEPRESSION: ICD-10-CM

## 2025-03-26 RX ORDER — VENLAFAXINE HYDROCHLORIDE 150 MG/1
150 CAPSULE, EXTENDED RELEASE ORAL DAILY
Qty: 90 CAPSULE | Refills: 3 | Status: SHIPPED | OUTPATIENT
Start: 2025-03-26

## 2025-03-26 NOTE — TELEPHONE ENCOUNTER
Comments:     Last Office Visit (last PCP visit):   12/4/2024    Next Visit Date:  Future Appointments   Date Time Provider Department Center   5/19/2025  1:45 PM Jonas Flynn MD [facility]   6/4/2025  8:00 AM Johnnie Livingston MD [facility] DEP       **If hasn't been seen in over a year OR hasn't followed up according to last diabetes/ADHD visit, make appointment for patient before sending refill to provider.    Rx requested:  Requested Prescriptions     Pending Prescriptions Disp Refills    venlafaxine (EFFEXOR XR) 150 MG extended release capsule [Pharmacy Med Name: Venlafaxine HCl  MG Oral Capsule Extended Release 24 Hour] 90 capsule 3     Sig: TAKE 1 CAPSULE BY MOUTH DAILY

## 2025-04-15 DIAGNOSIS — E66.811 CLASS 1 OBESITY WITH SERIOUS COMORBIDITY AND BODY MASS INDEX (BMI) OF 33.0 TO 33.9 IN ADULT, UNSPECIFIED OBESITY TYPE: ICD-10-CM

## 2025-04-15 DIAGNOSIS — E11.9 TYPE 2 DIABETES MELLITUS WITHOUT COMPLICATION, WITH LONG-TERM CURRENT USE OF INSULIN: ICD-10-CM

## 2025-04-15 DIAGNOSIS — Z79.4 TYPE 2 DIABETES MELLITUS WITHOUT COMPLICATION, WITH LONG-TERM CURRENT USE OF INSULIN: ICD-10-CM

## 2025-04-15 RX ORDER — SEMAGLUTIDE 1.34 MG/ML
0.25 INJECTION, SOLUTION SUBCUTANEOUS WEEKLY
Qty: 2 ADJUSTABLE DOSE PRE-FILLED PEN SYRINGE | Refills: 1 | Status: SHIPPED | OUTPATIENT
Start: 2025-04-15

## 2025-04-15 NOTE — TELEPHONE ENCOUNTER
Comments: Pt currently in [place] on vacation    Last Office Visit (last PCP visit):   12/4/2024    Next Visit Date:  Future Appointments   Date Time Provider Department Center   5/19/2025  1:45 PM Jonas Flynn MD [facility]   6/4/2025  8:00 AM Johnnie Livingston MD [facility]       **If hasn't been seen in over a year OR hasn't followed up according to last diabetes/ADHD visit, make appointment for patient before sending refill to provider.    Rx requested:  Requested Prescriptions     Pending Prescriptions Disp Refills    Semaglutide,0.25 or 0.5MG/DOS, (OZEMPIC, 0.25 OR 0.5 MG/DOSE,) 2 MG/1.5ML SOPN 2 Adjustable Dose Pre-filled Pen Syringe 1     Sig: Inject 0.25 mg into the skin once a week

## 2025-05-02 DIAGNOSIS — F51.04 PSYCHOPHYSIOLOGICAL INSOMNIA: ICD-10-CM

## 2025-05-02 RX ORDER — TRAZODONE HYDROCHLORIDE 50 MG/1
50 TABLET ORAL DAILY PRN
Qty: 90 TABLET | Refills: 1 | Status: SHIPPED | OUTPATIENT
Start: 2025-05-02

## 2025-05-08 ENCOUNTER — OFFICE VISIT (OUTPATIENT)
Dept: FAMILY MEDICINE CLINIC | Age: 70
End: 2025-05-08
Payer: MEDICARE

## 2025-05-08 ENCOUNTER — HOSPITAL ENCOUNTER (OUTPATIENT)
Age: 70
Setting detail: SPECIMEN
Discharge: HOME | End: 2025-05-08
Payer: MEDICARE

## 2025-05-08 VITALS
HEART RATE: 64 BPM | HEIGHT: 63 IN | DIASTOLIC BLOOD PRESSURE: 64 MMHG | WEIGHT: 169.4 LBS | SYSTOLIC BLOOD PRESSURE: 118 MMHG | OXYGEN SATURATION: 95 % | BODY MASS INDEX: 30.02 KG/M2

## 2025-05-08 DIAGNOSIS — R39.9 LOWER URINARY TRACT SYMPTOMS (LUTS): ICD-10-CM

## 2025-05-08 DIAGNOSIS — M79.602 CHRONIC PAIN OF LEFT UPPER EXTREMITY: ICD-10-CM

## 2025-05-08 DIAGNOSIS — R25.2 FOOT CRAMPS: ICD-10-CM

## 2025-05-08 DIAGNOSIS — G89.29 CHRONIC PAIN OF LEFT UPPER EXTREMITY: ICD-10-CM

## 2025-05-08 DIAGNOSIS — R39.9 LOWER URINARY TRACT SYMPTOMS (LUTS): Primary | ICD-10-CM

## 2025-05-08 DIAGNOSIS — J43.9 PULMONARY EMPHYSEMA, UNSPECIFIED EMPHYSEMA TYPE (HCC): ICD-10-CM

## 2025-05-08 LAB
APPEARANCE FLUID: ABNORMAL
BILIRUBIN, POC: ABNORMAL
BLOOD URINE, POC: ABNORMAL
CLARITY, POC: ABNORMAL
COLOR, POC: ABNORMAL
GLUCOSE URINE, POC: ABNORMAL MG/DL
KETONES, POC: ABNORMAL MG/DL
LEUKOCYTE EST, POC: ABNORMAL
NITRITE, POC: ABNORMAL
PH, POC: 8
PROTEIN, POC: ABNORMAL MG/DL
SPECIFIC GRAVITY, POC: 1.01
UROBILINOGEN, POC: ABNORMAL MG/DL

## 2025-05-08 PROCEDURE — 1159F MED LIST DOCD IN RCRD: CPT | Performed by: FAMILY MEDICINE

## 2025-05-08 PROCEDURE — 3078F DIAST BP <80 MM HG: CPT | Performed by: FAMILY MEDICINE

## 2025-05-08 PROCEDURE — 3074F SYST BP LT 130 MM HG: CPT | Performed by: FAMILY MEDICINE

## 2025-05-08 PROCEDURE — 87077 CULTURE AEROBIC IDENTIFY: CPT

## 2025-05-08 PROCEDURE — 87086 URINE CULTURE/COLONY COUNT: CPT

## 2025-05-08 PROCEDURE — 99213 OFFICE O/P EST LOW 20 MIN: CPT | Performed by: FAMILY MEDICINE

## 2025-05-08 PROCEDURE — 81002 URINALYSIS NONAUTO W/O SCOPE: CPT | Performed by: FAMILY MEDICINE

## 2025-05-08 PROCEDURE — 1123F ACP DISCUSS/DSCN MKR DOCD: CPT | Performed by: FAMILY MEDICINE

## 2025-05-08 RX ORDER — SULFAMETHOXAZOLE AND TRIMETHOPRIM 800; 160 MG/1; MG/1
1 TABLET ORAL 2 TIMES DAILY
Qty: 10 TABLET | Refills: 0 | Status: SHIPPED | OUTPATIENT
Start: 2025-05-08 | End: 2025-05-13

## 2025-05-08 SDOH — ECONOMIC STABILITY: FOOD INSECURITY: WITHIN THE PAST 12 MONTHS, YOU WORRIED THAT YOUR FOOD WOULD RUN OUT BEFORE YOU GOT MONEY TO BUY MORE.: NEVER TRUE

## 2025-05-08 SDOH — ECONOMIC STABILITY: FOOD INSECURITY: WITHIN THE PAST 12 MONTHS, THE FOOD YOU BOUGHT JUST DIDN'T LAST AND YOU DIDN'T HAVE MONEY TO GET MORE.: NEVER TRUE

## 2025-05-08 ASSESSMENT — PATIENT HEALTH QUESTIONNAIRE - PHQ9
9. THOUGHTS THAT YOU WOULD BE BETTER OFF DEAD, OR OF HURTING YOURSELF: NOT AT ALL
SUM OF ALL RESPONSES TO PHQ QUESTIONS 1-9: 0
8. MOVING OR SPEAKING SO SLOWLY THAT OTHER PEOPLE COULD HAVE NOTICED. OR THE OPPOSITE, BEING SO FIGETY OR RESTLESS THAT YOU HAVE BEEN MOVING AROUND A LOT MORE THAN USUAL: NOT AT ALL
SUM OF ALL RESPONSES TO PHQ QUESTIONS 1-9: 0
4. FEELING TIRED OR HAVING LITTLE ENERGY: NOT AT ALL
6. FEELING BAD ABOUT YOURSELF - OR THAT YOU ARE A FAILURE OR HAVE LET YOURSELF OR YOUR FAMILY DOWN: NOT AT ALL
SUM OF ALL RESPONSES TO PHQ QUESTIONS 1-9: 0
3. TROUBLE FALLING OR STAYING ASLEEP: NOT AT ALL
2. FEELING DOWN, DEPRESSED OR HOPELESS: NOT AT ALL
SUM OF ALL RESPONSES TO PHQ QUESTIONS 1-9: 0
10. IF YOU CHECKED OFF ANY PROBLEMS, HOW DIFFICULT HAVE THESE PROBLEMS MADE IT FOR YOU TO DO YOUR WORK, TAKE CARE OF THINGS AT HOME, OR GET ALONG WITH OTHER PEOPLE: NOT DIFFICULT AT ALL
7. TROUBLE CONCENTRATING ON THINGS, SUCH AS READING THE NEWSPAPER OR WATCHING TELEVISION: NOT AT ALL
5. POOR APPETITE OR OVEREATING: NOT AT ALL
1. LITTLE INTEREST OR PLEASURE IN DOING THINGS: NOT AT ALL

## 2025-05-08 NOTE — PROGRESS NOTES
Obtained consent for student  
about 1 week (around 5/15/2025) for DM2.    Johnnie Livingston MD      -----------------------------------------------------------------------------      Objective     Physical Exam:  Physical Exam  Vitals reviewed.   Constitutional:       General: She is not in acute distress.     Appearance: She is well-developed.   HENT:      Head: Normocephalic and atraumatic.   Cardiovascular:      Rate and Rhythm: Normal rate.   Pulmonary:      Effort: Pulmonary effort is normal. No respiratory distress.   Musculoskeletal:      Cervical back: Normal range of motion.   Skin:     General: Skin is warm and dry.   Neurological:      Mental Status: She is alert and oriented to person, place, and time.   Psychiatric:         Attention and Perception: Attention normal.         Behavior: Behavior normal.           Review of systems as noted in HPI    Past Medical History:   Diagnosis Date    Arthritis     Cerebral artery occlusion with cerebral infarction (HCC) 2016 June    TIA / sx left sided weakness & fell & unable to get up off floor for several hours    COPD (chronic obstructive pulmonary disease) ([facility])     smoking habit > 40 yrs    Depression     Headache     Hematoma of abdominal wall     Hyperlipidemia     meds > 10 yrs    Hypertension     meds since TIA / 6/2016    Neuropathy     Osteoarthritis     POTS (postural orthostatic tachycardia syndrome)     2000's    Restless legs syndrome     Type 2 diabetes mellitus without complication ([facility])     hx > 7 yrs     Past Surgical History:   Procedure Laterality Date    ABDOMINAL EXPLORATION SURGERY  1970s    due to abdominal pain / no definite dx    BACK SURGERY  12/24/2018    lumbar disc OR at [facility]    COLONOSCOPY      COLONOSCOPY N/A 1/11/2024    COLONOSCOPY DIAGNOSTIC performed by Suzette Hi MD at [facility] CENTER    ELBOW SURGERY Right 1989    due to tendonitis    ENDOMETRIAL ABLATION  1990s    AUB    OTHER SURGICAL HISTORY      pain pump placement 2010 & replacement 2016    MD

## 2025-05-10 LAB
BACTERIA UR CULT: ABNORMAL
BACTERIA UR CULT: ABNORMAL
ORGANISM: ABNORMAL

## 2025-05-13 ENCOUNTER — TELEPHONE (OUTPATIENT)
Age: 70
End: 2025-05-13

## 2025-05-13 NOTE — TELEPHONE ENCOUNTER
Tracy Kenyon would like to cancel the following appointments:      Dr. Jonas Flynn MD in [facility] CARDIOLOGY [facility] ([id number]), 5/19/2025  1:45 PM      Comments:     Appointment Cancellation Request  (Newest Message First)  Tracy Kenyon  P [facility]  (supporting Jonas YUN MD)12 hours ago (6:42 PM)       Tracy Kenyon would like to cancel the following appointments:     Dr. Jonas Flynn MD in [facility] CARDIOLOGY [facility] ([id number]), 5/19/2025  1:45 PM     Comments:

## 2025-05-15 ENCOUNTER — OFFICE VISIT (OUTPATIENT)
Dept: FAMILY MEDICINE CLINIC | Age: 70
End: 2025-05-15
Payer: MEDICARE

## 2025-05-15 ENCOUNTER — HOSPITAL ENCOUNTER (OUTPATIENT)
Age: 70
Setting detail: SPECIMEN
Discharge: HOME | End: 2025-05-15
Payer: MEDICARE

## 2025-05-15 VITALS
DIASTOLIC BLOOD PRESSURE: 82 MMHG | SYSTOLIC BLOOD PRESSURE: 112 MMHG | OXYGEN SATURATION: 97 % | WEIGHT: 167.6 LBS | HEIGHT: 63 IN | HEART RATE: 87 BPM | BODY MASS INDEX: 29.7 KG/M2

## 2025-05-15 DIAGNOSIS — Z87.891 PERSONAL HISTORY OF TOBACCO USE: ICD-10-CM

## 2025-05-15 DIAGNOSIS — Z00.00 MEDICARE ANNUAL WELLNESS VISIT, SUBSEQUENT: Primary | ICD-10-CM

## 2025-05-15 DIAGNOSIS — Z00.00 MEDICARE ANNUAL WELLNESS VISIT, SUBSEQUENT: ICD-10-CM

## 2025-05-15 DIAGNOSIS — E11.9 TYPE 2 DIABETES MELLITUS WITHOUT COMPLICATION, WITH LONG-TERM CURRENT USE OF INSULIN (HCC): ICD-10-CM

## 2025-05-15 DIAGNOSIS — Z12.31 ENCOUNTER FOR SCREENING MAMMOGRAM FOR MALIGNANT NEOPLASM OF BREAST: ICD-10-CM

## 2025-05-15 DIAGNOSIS — Z79.4 TYPE 2 DIABETES MELLITUS WITHOUT COMPLICATION, WITH LONG-TERM CURRENT USE OF INSULIN (HCC): ICD-10-CM

## 2025-05-15 LAB
ALBUMIN SERPL-MCNC: 4 G/DL (ref 3.5–4.6)
ALP SERPL-CCNC: 80 U/L (ref 40–130)
ALT SERPL-CCNC: 9 U/L (ref 0–33)
ANION GAP SERPL CALCULATED.3IONS-SCNC: 11 MEQ/L (ref 9–15)
AST SERPL-CCNC: 14 U/L (ref 0–35)
BILIRUB SERPL-MCNC: <0.2 MG/DL (ref 0.2–0.7)
BUN SERPL-MCNC: 12 MG/DL (ref 8–23)
CALCIUM SERPL-MCNC: 8.9 MG/DL (ref 8.5–9.9)
CHLORIDE SERPL-SCNC: 99 MEQ/L (ref 95–107)
CHOLEST SERPL-MCNC: 150 MG/DL (ref 0–199)
CO2 SERPL-SCNC: 26 MEQ/L (ref 20–31)
CREAT SERPL-MCNC: 0.98 MG/DL (ref 0.5–0.9)
GLOBULIN SER CALC-MCNC: 2.7 G/DL (ref 2.3–3.5)
GLUCOSE SERPL-MCNC: 109 MG/DL (ref 70–99)
HBA1C MFR BLD: 5.6 %
HDLC SERPL-MCNC: 54 MG/DL (ref 40–59)
LDLC SERPL CALC-MCNC: 73 MG/DL (ref 0–129)
POTASSIUM SERPL-SCNC: 4.7 MEQ/L (ref 3.4–4.9)
PROT SERPL-MCNC: 6.7 G/DL (ref 6.3–8)
SODIUM SERPL-SCNC: 136 MEQ/L (ref 135–144)
TRIGL SERPL-MCNC: 114 MG/DL (ref 0–150)

## 2025-05-15 PROCEDURE — 80053 COMPREHEN METABOLIC PANEL: CPT

## 2025-05-15 PROCEDURE — 83036 HEMOGLOBIN GLYCOSYLATED A1C: CPT | Performed by: FAMILY MEDICINE

## 2025-05-15 PROCEDURE — 80061 LIPID PANEL: CPT

## 2025-05-15 RX ORDER — SEMAGLUTIDE 0.68 MG/ML
0.5 INJECTION, SOLUTION SUBCUTANEOUS WEEKLY
Qty: 3 ML | Refills: 1 | Status: SHIPPED | OUTPATIENT
Start: 2025-05-15

## 2025-05-15 RX ORDER — INSULIN GLARGINE 100 [IU]/ML
20 INJECTION, SOLUTION SUBCUTANEOUS DAILY
Qty: 20 ML | Refills: 0 | Status: SHIPPED | OUTPATIENT
Start: 2025-05-15 | End: 2025-08-13

## 2025-05-15 RX ORDER — GLUCOSAMINE HCL/CHONDROITIN SU 500-400 MG
CAPSULE ORAL
Qty: 100 STRIP | Refills: 11 | Status: SHIPPED | OUTPATIENT
Start: 2025-05-15

## 2025-05-15 RX ORDER — SEMAGLUTIDE 0.68 MG/ML
INJECTION, SOLUTION SUBCUTANEOUS
COMMUNITY
Start: 2025-04-15 | End: 2025-05-15 | Stop reason: SDUPTHER

## 2025-05-15 ASSESSMENT — LIFESTYLE VARIABLES: HOW MANY STANDARD DRINKS CONTAINING ALCOHOL DO YOU HAVE ON A TYPICAL DAY: PATIENT DOES NOT DRINK

## 2025-05-15 ASSESSMENT — PATIENT HEALTH QUESTIONNAIRE - PHQ9
3. TROUBLE FALLING OR STAYING ASLEEP: NOT AT ALL
2. FEELING DOWN, DEPRESSED OR HOPELESS: NOT AT ALL
8. MOVING OR SPEAKING SO SLOWLY THAT OTHER PEOPLE COULD HAVE NOTICED. OR THE OPPOSITE, BEING SO FIGETY OR RESTLESS THAT YOU HAVE BEEN MOVING AROUND A LOT MORE THAN USUAL: NOT AT ALL
SUM OF ALL RESPONSES TO PHQ QUESTIONS 1-9: 0
9. THOUGHTS THAT YOU WOULD BE BETTER OFF DEAD, OR OF HURTING YOURSELF: NOT AT ALL
7. TROUBLE CONCENTRATING ON THINGS, SUCH AS READING THE NEWSPAPER OR WATCHING TELEVISION: NOT AT ALL
6. FEELING BAD ABOUT YOURSELF - OR THAT YOU ARE A FAILURE OR HAVE LET YOURSELF OR YOUR FAMILY DOWN: NOT AT ALL
5. POOR APPETITE OR OVEREATING: NOT AT ALL
10. IF YOU CHECKED OFF ANY PROBLEMS, HOW DIFFICULT HAVE THESE PROBLEMS MADE IT FOR YOU TO DO YOUR WORK, TAKE CARE OF THINGS AT HOME, OR GET ALONG WITH OTHER PEOPLE: NOT DIFFICULT AT ALL
SUM OF ALL RESPONSES TO PHQ QUESTIONS 1-9: 0
SUM OF ALL RESPONSES TO PHQ QUESTIONS 1-9: 0
4. FEELING TIRED OR HAVING LITTLE ENERGY: NOT AT ALL
SUM OF ALL RESPONSES TO PHQ QUESTIONS 1-9: 0
1. LITTLE INTEREST OR PLEASURE IN DOING THINGS: NOT AT ALL

## 2025-05-15 NOTE — PROGRESS NOTES
[facility] PRIMARY CARE  [address] [place] [id number]  Dept: [phone]  Dept Fax: [phone]     Chief Complaint:  Chief Complaint   Patient presents with    Medicare AWV     Not fasting ; had 2 cups of coffee with cream    Diabetes     5 month follow up       Vitals:    05/15/25 0821   BP: 112/82   Pulse: 87   SpO2: 97%   Weight: 76 kg (167 lb 9.6 oz)   Height: 1.6 m (5' 3\")       HPI:  70 y.o.female who presents for the following:      DM2: a1c 5.6 from 6.2; compliant with meds; compliant with diet; No excessive thirst, urination, or blurry vision.     Current diabetes regimen:  - Lantus 36 qD (decrease to 20)  - Ozempic (increase to 0.5)     Wt Readings from Last 3 Encounters:   05/15/25 76 kg (167 lb 9.6 oz)   05/08/25 76.8 kg (169 lb 6.4 oz)   12/04/24 82.1 kg (181 lb)         -----------------------------------------------------------------------------    Assessment/Plan:  70 y.o. female here mainly for the following:  DM2  Controlled but she has a goal of increased wt loss and possibly going off the insulin  Decreasing the lantus and increasing the ozempic today  Annual  routine labs and screenings         ICD-10-CM    1. Medicare annual wellness visit, subsequent  Z00.00 Lipid Panel     Comprehensive Metabolic Panel      2. Type 2 diabetes mellitus without complication, with long-term current use of insulin (HCC)  E11.9 POCT glycosylated hemoglobin (Hb A1C)    Z79.4 blood glucose monitor strips     insulin glargine (LANTUS SOLOSTAR) 100 UNIT/ML injection pen     OZEMPIC, 0.25 OR 0.5 MG/DOSE, 2 MG/3ML SOPN      3. Encounter for screening mammogram for malignant neoplasm of breast  Z12.31 [facility] DIGITAL SCREEN BILATERAL      4. Personal history of tobacco use  Z87.891 WI VISIT TO DISCUSS LUNG CA SCREEN W LDCT     CT Lung Screen (Initial/Annual/Baseline)          Return in about 3 months (around 8/15/2025) for DM2.    Johnnie Livingston,

## 2025-05-16 ENCOUNTER — RESULTS FOLLOW-UP (OUTPATIENT)
Dept: FAMILY MEDICINE CLINIC | Age: 70
End: 2025-05-16

## 2025-05-16 DIAGNOSIS — R92.8 ABNORMAL MAMMOGRAM OF RIGHT BREAST: ICD-10-CM

## 2025-05-16 DIAGNOSIS — Z87.891 PERSONAL HISTORY OF TOBACCO USE: Primary | ICD-10-CM

## 2025-05-20 ENCOUNTER — OFFICE VISIT (OUTPATIENT)
Dept: ORTHOPEDIC SURGERY | Age: 70
End: 2025-05-20
Payer: MEDICARE

## 2025-05-20 VITALS
WEIGHT: 167 LBS | BODY MASS INDEX: 29.59 KG/M2 | OXYGEN SATURATION: 98 % | TEMPERATURE: 97 F | HEART RATE: 89 BPM | HEIGHT: 63 IN

## 2025-05-20 DIAGNOSIS — M75.52 SUBACROMIAL BURSITIS OF LEFT SHOULDER JOINT: ICD-10-CM

## 2025-05-20 DIAGNOSIS — M75.82 ROTATOR CUFF TENDINITIS, LEFT: Primary | ICD-10-CM

## 2025-05-20 PROCEDURE — 20611 DRAIN/INJ JOINT/BURSA W/US: CPT | Performed by: FAMILY MEDICINE

## 2025-05-20 PROCEDURE — 1159F MED LIST DOCD IN RCRD: CPT | Performed by: FAMILY MEDICINE

## 2025-05-20 PROCEDURE — 99204 OFFICE O/P NEW MOD 45 MIN: CPT | Performed by: FAMILY MEDICINE

## 2025-05-20 PROCEDURE — 1123F ACP DISCUSS/DSCN MKR DOCD: CPT | Performed by: FAMILY MEDICINE

## 2025-05-20 PROCEDURE — 1125F AMNT PAIN NOTED PAIN PRSNT: CPT | Performed by: FAMILY MEDICINE

## 2025-05-20 RX ORDER — LIDOCAINE HYDROCHLORIDE 10 MG/ML
3 INJECTION, SOLUTION INFILTRATION; PERINEURAL ONCE
Status: COMPLETED | OUTPATIENT
Start: 2025-05-20 | End: 2025-05-20

## 2025-05-20 RX ORDER — TRIAMCINOLONE ACETONIDE 40 MG/ML
80 INJECTION, SUSPENSION INTRA-ARTICULAR; INTRAMUSCULAR ONCE
Status: COMPLETED | OUTPATIENT
Start: 2025-05-20 | End: 2025-05-20

## 2025-05-20 RX ADMIN — LIDOCAINE HYDROCHLORIDE 3 ML: 10 INJECTION, SOLUTION INFILTRATION; PERINEURAL at 10:30

## 2025-05-20 RX ADMIN — TRIAMCINOLONE ACETONIDE 80 MG: 40 INJECTION, SUSPENSION INTRA-ARTICULAR; INTRAMUSCULAR at 10:30

## 2025-05-20 ASSESSMENT — ENCOUNTER SYMPTOMS: BACK PAIN: 0

## 2025-05-20 NOTE — PROGRESS NOTES
[facility] [place] [facility], [facility] OR[facility]OPEDICS  [address]  [address]  [place] [place] [address]  Dept: [phone]  Dept Fax: [phone]  Loc: [phone]     5/20/2025    Visit type: New patient    Reason for Visit: New Patient (Left arm thinks she may have torn a ligament . /Having issues with reaching and lifting and picking things up has shooting pain up the arm. /Had a fall back in January slipped on ice . )         Patient: Tracy Kenyon is a 70 y.o. female     HPI: 70-year-old female presents with left upper arm/elbow pain that has been ongoing since she had a traumatic fall in January.  Patient has pain when she is reaching or lifting objects.  She has not had improvement since the injury, feels like the pain is worsening.  PCP note regarding this topic was reviewed prior to encounter.    ASSESSMENT/PLAN   Rotator cuff tendinitis, left  -     lidocaine 1 % injection 3 mL; 3 mL, Intra-artICUlar, ONCE, 1 dose, On Tue 5/20/25 at 1115  -     triamcinolone acetonide (KENALOG-40) injection 80 mg; 80 mg, Intra-artICUlar, ONCE, 1 dose, On Tue 5/20/25 at 1115  -     ARTHROCENTESIS ASPIR&/INJ MAJOR JT/BURSA W/US  -     XR SHOULDER LEFT (MIN 2 VIEWS); Future  Subacromial bursitis of left shoulder joint  -     lidocaine 1 % injection 3 mL; 3 mL, Intra-artICUlar, ONCE, 1 dose, On Tue 5/20/25 at 1115  -     triamcinolone acetonide (KENALOG-40) injection 80 mg; 80 mg, Intra-artICUlar, ONCE, 1 dose, On Tue 5/20/25 at 1115  -     ARTHROCENTESIS ASPIR&/INJ MAJOR JT/BURSA W/US  -     XR SHOULDER LEFT (MIN 2 VIEWS); Future     -Pertinent exam/Discussion: Empty can test positive.  Neer's and Barron test positive.  Compression rotation test and crank test negative.  Speeds and Yergason's Test negative.  No tenderness to palpation of the AC joint or bicipital tendon through the bicipital groove.    -Treatment options were discussed and all questions were answered  -Discussed use of

## 2025-05-27 ENCOUNTER — HOSPITAL ENCOUNTER (OUTPATIENT)
Dept: GENERAL RADIOLOGY | Age: 70
Discharge: HOME | End: 2025-05-29
Attending: FAMILY MEDICINE
Payer: MEDICARE

## 2025-05-27 ENCOUNTER — HOSPITAL ENCOUNTER (OUTPATIENT)
Dept: CT IMAGING | Age: 70
Discharge: HOME | End: 2025-05-29
Payer: MEDICARE

## 2025-05-27 ENCOUNTER — HOSPITAL ENCOUNTER (OUTPATIENT)
Dept: WOMENS IMAGING | Age: 70
Discharge: HOME | End: 2025-05-29
Payer: MEDICARE

## 2025-05-27 DIAGNOSIS — Z87.891 PERSONAL HISTORY OF TOBACCO USE: ICD-10-CM

## 2025-05-27 DIAGNOSIS — Z12.31 ENCOUNTER FOR SCREENING MAMMOGRAM FOR MALIGNANT NEOPLASM OF BREAST: ICD-10-CM

## 2025-05-27 DIAGNOSIS — M75.52 SUBACROMIAL BURSITIS OF LEFT SHOULDER JOINT: ICD-10-CM

## 2025-05-27 DIAGNOSIS — M75.82 ROTATOR CUFF TENDINITIS, LEFT: ICD-10-CM

## 2025-05-27 PROCEDURE — 71271 CT THORAX LUNG CANCER SCR C-: CPT

## 2025-05-27 PROCEDURE — 77063 BREAST TOMOSYNTHESIS BI: CPT

## 2025-05-27 PROCEDURE — 73030 X-RAY EXAM OF SHOULDER: CPT

## 2025-05-28 ENCOUNTER — RESULTS FOLLOW-UP (OUTPATIENT)
Age: 70
End: 2025-05-28

## 2025-06-04 ENCOUNTER — TELEPHONE (OUTPATIENT)
Dept: FAMILY MEDICINE CLINIC | Age: 70
End: 2025-06-04

## 2025-06-04 NOTE — TELEPHONE ENCOUNTER
Patient states UTI is back, asking if medication can be ordered for this.     Just treated for UTI last month.

## 2025-06-05 ENCOUNTER — HOSPITAL ENCOUNTER (OUTPATIENT)
Dept: WOMENS IMAGING | Age: 70
Discharge: HOME | End: 2025-06-07
Payer: MEDICARE

## 2025-06-05 ENCOUNTER — HOSPITAL ENCOUNTER (OUTPATIENT)
Dept: ULTRASOUND IMAGING | Age: 70
Discharge: HOME | End: 2025-06-07
Payer: MEDICARE

## 2025-06-05 ENCOUNTER — RESULTS FOLLOW-UP (OUTPATIENT)
Dept: FAMILY MEDICINE CLINIC | Age: 70
End: 2025-06-05

## 2025-06-05 DIAGNOSIS — R92.8 ABNORMAL MAMMOGRAM OF RIGHT BREAST: Primary | ICD-10-CM

## 2025-06-05 DIAGNOSIS — R92.8 ABNORMAL MAMMOGRAM OF RIGHT BREAST: ICD-10-CM

## 2025-06-05 PROCEDURE — G0279 TOMOSYNTHESIS, MAMMO: HCPCS

## 2025-06-05 PROCEDURE — 76642 ULTRASOUND BREAST LIMITED: CPT

## 2025-06-06 ENCOUNTER — OFFICE VISIT (OUTPATIENT)
Dept: FAMILY MEDICINE CLINIC | Age: 70
End: 2025-06-06
Payer: MEDICARE

## 2025-06-06 ENCOUNTER — HOSPITAL ENCOUNTER (OUTPATIENT)
Age: 70
Setting detail: SPECIMEN
Discharge: HOME | End: 2025-06-06
Payer: MEDICARE

## 2025-06-06 ENCOUNTER — APPOINTMENT (OUTPATIENT)
Dept: OTOLARYNGOLOGY | Facility: CLINIC | Age: 70
End: 2025-06-06
Payer: MEDICARE

## 2025-06-06 VITALS
DIASTOLIC BLOOD PRESSURE: 68 MMHG | OXYGEN SATURATION: 99 % | TEMPERATURE: 97.2 F | BODY MASS INDEX: 29.16 KG/M2 | SYSTOLIC BLOOD PRESSURE: 128 MMHG | HEART RATE: 76 BPM | WEIGHT: 164.6 LBS | HEIGHT: 63 IN

## 2025-06-06 DIAGNOSIS — R39.9 LOWER URINARY TRACT SYMPTOMS (LUTS): Primary | ICD-10-CM

## 2025-06-06 DIAGNOSIS — R22.1 NECK MASS: Primary | ICD-10-CM

## 2025-06-06 DIAGNOSIS — R39.9 LOWER URINARY TRACT SYMPTOMS (LUTS): ICD-10-CM

## 2025-06-06 DIAGNOSIS — K11.8 PAROTID MASS: ICD-10-CM

## 2025-06-06 LAB
BILIRUBIN, POC: ABNORMAL
BLOOD URINE, POC: ABNORMAL
CLARITY, POC: ABNORMAL
COLOR, POC: YELLOW
GLUCOSE URINE, POC: ABNORMAL MG/DL
KETONES, POC: ABNORMAL MG/DL
LEUKOCYTE EST, POC: ABNORMAL
NITRITE, POC: ABNORMAL
PH, POC: 7.5
PROTEIN, POC: ABNORMAL MG/DL
SPECIFIC GRAVITY, POC: 1.01
UROBILINOGEN, POC: ABNORMAL MG/DL

## 2025-06-06 PROCEDURE — 81003 URINALYSIS AUTO W/O SCOPE: CPT | Performed by: FAMILY MEDICINE

## 2025-06-06 PROCEDURE — 87086 URINE CULTURE/COLONY COUNT: CPT

## 2025-06-06 PROCEDURE — 4010F ACE/ARB THERAPY RXD/TAKEN: CPT | Performed by: OTOLARYNGOLOGY

## 2025-06-06 PROCEDURE — 1159F MED LIST DOCD IN RCRD: CPT | Performed by: OTOLARYNGOLOGY

## 2025-06-06 PROCEDURE — 1160F RVW MEDS BY RX/DR IN RCRD: CPT | Performed by: OTOLARYNGOLOGY

## 2025-06-06 PROCEDURE — 99213 OFFICE O/P EST LOW 20 MIN: CPT | Performed by: OTOLARYNGOLOGY

## 2025-06-06 RX ORDER — NITROFURANTOIN 25; 75 MG/1; MG/1
100 CAPSULE ORAL 2 TIMES DAILY
Qty: 10 CAPSULE | Refills: 0 | Status: SHIPPED | OUTPATIENT
Start: 2025-06-06 | End: 2025-06-11

## 2025-06-06 NOTE — PROGRESS NOTES
[facility] PRIMARY CARE  [address]  [place] [address]  Dept: [phone]  Dept Fax: [phone]     Chief Complaint:  Chief Complaint   Patient presents with    Urinary Problem     Blood in urine, painful urination, painful vaginal area, x1 week    Orders     Needs repeat US and mammogram for the right breast for 6 months from now        Vitals:    06/06/25 1528   BP: 128/68   Pulse: 76   Temp: 97.2 °F (36.2 °C)   TempSrc: Infrared   SpO2: 99%   Weight: 74.7 kg (164 lb 9.6 oz)   Height: 1.6 m (5' 3\")       HPI:  70 y.o.female who presents for the following:      LUTS: x1wk; hematuria, dysuria; had similar issues 1mo ago which improved after abx    -----------------------------------------------------------------------------    Assessment/Plan:  70 y.o. female here mainly for the following:  LUTS  Urine dip suggestive of UTI  Empiric macrobid        ICD-10-CM    1. Lower urinary tract symptoms (LUTS)  R39.9 POCT Urinalysis No Micro (Auto)     Culture, Urine     nitrofurantoin, macrocrystal-monohydrate, (MACROBID) 100 MG capsule          Return if symptoms worsen or fail to improve.    Johnnie Livingstno MD      -----------------------------------------------------------------------------      Objective     Physical Exam:  Physical Exam  Vitals reviewed.   Constitutional:       General: She is not in acute distress.     Appearance: She is well-developed.   HENT:      Head: Normocephalic and atraumatic.   Cardiovascular:      Rate and Rhythm: Normal rate.   Pulmonary:      Effort: Pulmonary effort is normal. No respiratory distress.   Musculoskeletal:      Cervical back: Normal range of motion.   Skin:     General: Skin is warm and dry.   Neurological:      Mental Status: She is alert and oriented to person, place, and time.   Psychiatric:         Attention and Perception: Attention normal.         Behavior: Behavior normal.           Review of systems as noted in HPI    Past Medical History:

## 2025-06-06 NOTE — PROGRESS NOTES
Patient returns.  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  Patient doing very well.  Has lost a lot of weight and looks very good.  All remaining ENT inquiries clear.  No other change in past medical surgical history.    Exam:  No acute distress.  The external ear structures appear normal. The ear canals patent and the tympanic membranes are intact without evidence of air-fluid levels, retraction, or congenital defects.  Anterior rhinoscopy notes essentially a midline nasal septum. Examination is noted for normal healthy mucosal membranes without any evidence of lesions, polyps, or exudate. The tongue is normally mobile. There are no lesions on the gingiva, buccal, or oral mucosa. There are no oral cavity masses.  The neck is negative for mass lymphadenopathy. The trachea and parotid are clear. The thyroid bed is grossly unremarkable. The salivary gland structures are grossly unremarkable.    Assessment and plan:  Doing great following resection of parotid tumors all benign.  Favor observation.  Recheck 6 months for final check sooner with issue.  All questions were answered in this regard accordingly.

## 2025-06-08 LAB
BACTERIA UR CULT: ABNORMAL
BACTERIA UR CULT: ABNORMAL
ORGANISM: ABNORMAL

## 2025-06-12 ENCOUNTER — TELEPHONE (OUTPATIENT)
Dept: FAMILY MEDICINE CLINIC | Age: 70
End: 2025-06-12

## 2025-06-12 DIAGNOSIS — N30.00 ACUTE CYSTITIS WITHOUT HEMATURIA: Primary | ICD-10-CM

## 2025-06-12 NOTE — TELEPHONE ENCOUNTER
The particular bacteria seen in the urine is susceptible to macrobid and amoxicillin which is a penicillin. It looks like you get a rash with penicillin. If the rash is minor, I'd like to send in the amoxicillin for you. You could take benadryl and claritin with it if you worry about getting a rash. Can I send the amoxicillin in for you?

## 2025-06-12 NOTE — TELEPHONE ENCOUNTER
Patient stopped into the office to inform us that the MACROBID  that was prescribed for her UTI has not helped. Stated it helped a little with the pain but she still has blood in her urine.     Asking if an alternate medication can be prescribed.

## 2025-06-25 DIAGNOSIS — G45.9 TIA (TRANSIENT ISCHEMIC ATTACK): Primary | ICD-10-CM

## 2025-06-25 RX ORDER — CLOPIDOGREL BISULFATE 75 MG/1
75 TABLET ORAL DAILY
Qty: 90 TABLET | Refills: 1 | Status: SHIPPED | OUTPATIENT
Start: 2025-06-25

## 2025-06-25 NOTE — TELEPHONE ENCOUNTER
Requesting medication refill. Please approve or deny this request.    Rx requested:  Requested Prescriptions     Pending Prescriptions Disp Refills    clopidogrel (PLAVIX) 75 MG tablet 90 tablet 3     Sig: Take 1 tablet by mouth daily         Last Office Visit:   11/11/2024      Next Visit Date:  Future Appointments   Date Time Provider Department Center   7/1/2025  9:30 AM Muetzel, Esvin A, MD [facility]   7/10/2025  3:15 PM Jonas Flynn MD [facility]               Last refill 6/6/2024. Please approve or deny.

## 2025-06-27 DIAGNOSIS — E11.9 DIABETES MELLITUS TYPE 2 WITHOUT RETINOPATHY (HCC): ICD-10-CM

## 2025-06-27 RX ORDER — FLURBIPROFEN SODIUM 0.3 MG/ML
SOLUTION/ DROPS OPHTHALMIC
Qty: 160 EACH | Refills: 0 | Status: SHIPPED | OUTPATIENT
Start: 2025-06-27

## 2025-06-27 NOTE — TELEPHONE ENCOUNTER
Comments:     Last Office Visit (last PCP visit):   6/6/2025    Next Visit Date:  Future Appointments   Date Time Provider Department Center   7/1/2025  9:30 AM Muetzel, Esvin A, MD [facility]   7/10/2025  3:15 PM Jonas Flynn MD [facility]       **If hasn't been seen in over a year OR hasn't followed up according to last diabetes/ADHD visit, make appointment for patient before sending refill to provider.    Rx requested:  Requested Prescriptions     Pending Prescriptions Disp Refills    ULTICARE MINI PEN NEEDLES 31G X 6 MM MISC [Pharmacy Med Name: [id number]] 160 each 0     Sig: USE 2 PEN NEEDLES DAILY AS  DIRECTED

## 2025-07-01 ENCOUNTER — OFFICE VISIT (OUTPATIENT)
Dept: ORTHOPEDIC SURGERY | Age: 70
End: 2025-07-01
Payer: MEDICARE

## 2025-07-01 VITALS
HEIGHT: 63 IN | OXYGEN SATURATION: 96 % | HEART RATE: 116 BPM | BODY MASS INDEX: 28.35 KG/M2 | WEIGHT: 160 LBS | DIASTOLIC BLOOD PRESSURE: 60 MMHG | SYSTOLIC BLOOD PRESSURE: 116 MMHG

## 2025-07-01 DIAGNOSIS — M75.52 SUBACROMIAL BURSITIS OF LEFT SHOULDER JOINT: ICD-10-CM

## 2025-07-01 DIAGNOSIS — M75.82 ROTATOR CUFF TENDINITIS, LEFT: Primary | ICD-10-CM

## 2025-07-01 PROCEDURE — 1125F AMNT PAIN NOTED PAIN PRSNT: CPT | Performed by: FAMILY MEDICINE

## 2025-07-01 PROCEDURE — 3078F DIAST BP <80 MM HG: CPT | Performed by: FAMILY MEDICINE

## 2025-07-01 PROCEDURE — 3074F SYST BP LT 130 MM HG: CPT | Performed by: FAMILY MEDICINE

## 2025-07-01 PROCEDURE — 99213 OFFICE O/P EST LOW 20 MIN: CPT | Performed by: FAMILY MEDICINE

## 2025-07-01 PROCEDURE — 1159F MED LIST DOCD IN RCRD: CPT | Performed by: FAMILY MEDICINE

## 2025-07-01 PROCEDURE — 1123F ACP DISCUSS/DSCN MKR DOCD: CPT | Performed by: FAMILY MEDICINE

## 2025-07-01 ASSESSMENT — ENCOUNTER SYMPTOMS: BACK PAIN: 0

## 2025-07-01 NOTE — PROGRESS NOTES
[facility] [place] [facility], [facility] ORTHOPEDICS  [address]  [address]  [place] [address]  Dept: [phone]  Dept Fax: [phone]  Loc: [phone]     7/1/2025    Visit type: Follow up    Reason for Visit: Follow-up (Patient is here for follow up of left arm. Patient had cortisone injection last visit 5/20/25. Pt states cortico inj did help w/ Lt shoulder pain, intermittent soreness 1/10.  Denies any pain. )         Patient: Tracy Kenyon is a 70 y.o. female     HPI: 70-year-old female presents for follow-up visit pertaining to left rotator cuff tendinitis and subacromial bursitis.  Patient received a subacromial cortisone injection on 5/20/2025 and has had significant improvement in regards to function and reduction in pain.    ASSESSMENT/PLAN   Rotator cuff tendinitis, left  Subacromial bursitis of left shoulder joint     -Pertinent exam/Discussion: Full range of motion, no pain with rotator cuff stressing, no tenderness to palpation    -Treatment options were discussed and all questions were answered  -Discussed use of ice and heat as needed, discussed activity modification  -Oral/topical medications: Acetaminophen as needed  -Physical therapy: Currently engages in physician driven home therapy program provided at previous encounter  -Procedure/injection: Subacromial cortisone injection on 5/20/2025 provided significant improvement  -Follow-up: As needed      No orders of the defined types were placed in this encounter.       Subjective      Review of Systems   Musculoskeletal:  Positive for arthralgias and myalgias. Negative for back pain, gait problem, joint swelling and neck pain.        Intermittent left shoulder pain   Skin:  Negative for wound.   Neurological:  Negative for dizziness, weakness and numbness.      Allergies   Allergen Reactions    Aspirin Swelling    Penicillins Rash       Current Outpatient Medications:     ULTICARE MINI PEN NEEDLES 31G

## 2025-07-03 DIAGNOSIS — E11.9 TYPE 2 DIABETES MELLITUS WITHOUT COMPLICATION, WITH LONG-TERM CURRENT USE OF INSULIN (HCC): ICD-10-CM

## 2025-07-03 DIAGNOSIS — Z79.4 TYPE 2 DIABETES MELLITUS WITHOUT COMPLICATION, WITH LONG-TERM CURRENT USE OF INSULIN (HCC): ICD-10-CM

## 2025-07-03 RX ORDER — GLUCOSAMINE HCL/CHONDROITIN SU 500-400 MG
CAPSULE ORAL
Qty: 100 STRIP | Refills: 11 | Status: SHIPPED | OUTPATIENT
Start: 2025-07-03

## 2025-07-03 NOTE — TELEPHONE ENCOUNTER
Comments:     Last Office Visit (last PCP visit):   6/6/2025    Next Visit Date:  Future Appointments   Date Time Provider Department Center   7/10/2025  3:15 PM Jonas Flynn MD [facility]       **If hasn't been seen in over a year OR hasn't followed up according to last diabetes/ADHD visit, make appointment for patient before sending refill to provider.    Rx requested:  Requested Prescriptions     Pending Prescriptions Disp Refills    blood glucose monitor strips 100 strip 11     Sig: Use TID

## 2025-07-08 ENCOUNTER — HOSPITAL ENCOUNTER (OUTPATIENT)
Age: 70
Setting detail: SPECIMEN
Discharge: HOME | End: 2025-07-08
Payer: MEDICARE

## 2025-07-08 ENCOUNTER — OFFICE VISIT (OUTPATIENT)
Dept: FAMILY MEDICINE CLINIC | Age: 70
End: 2025-07-08
Payer: MEDICARE

## 2025-07-08 VITALS
HEIGHT: 63 IN | BODY MASS INDEX: 29.3 KG/M2 | DIASTOLIC BLOOD PRESSURE: 58 MMHG | WEIGHT: 165.4 LBS | SYSTOLIC BLOOD PRESSURE: 118 MMHG | HEART RATE: 84 BPM | OXYGEN SATURATION: 98 %

## 2025-07-08 DIAGNOSIS — N39.0 RECURRENT UTI: Primary | ICD-10-CM

## 2025-07-08 DIAGNOSIS — N39.0 RECURRENT UTI: ICD-10-CM

## 2025-07-08 PROBLEM — R10.13 DYSPEPSIA: Status: RESOLVED | Noted: 2024-01-04 | Resolved: 2025-07-08

## 2025-07-08 PROBLEM — R22.1 MASS OF LEFT SIDE OF NECK: Status: RESOLVED | Noted: 2022-02-14 | Resolved: 2025-07-08

## 2025-07-08 PROBLEM — Z86.0100 HISTORY OF COLON POLYPS: Status: RESOLVED | Noted: 2023-12-18 | Resolved: 2025-07-08

## 2025-07-08 PROBLEM — S30.1XXA ABDOMINAL HEMATOMA: Status: RESOLVED | Noted: 2023-12-25 | Resolved: 2025-07-08

## 2025-07-08 PROBLEM — K63.5 HYPERPLASTIC POLYP OF SIGMOID COLON: Status: RESOLVED | Noted: 2024-01-11 | Resolved: 2025-07-08

## 2025-07-08 LAB
APPEARANCE FLUID: ABNORMAL
BILIRUBIN, POC: ABNORMAL
BLOOD URINE, POC: ABNORMAL
CLARITY, POC: ABNORMAL
COLOR, POC: ABNORMAL
GLUCOSE URINE, POC: ABNORMAL MG/DL
KETONES, POC: ABNORMAL MG/DL
LEUKOCYTE EST, POC: ABNORMAL
NITRITE, POC: ABNORMAL
PH, POC: 9
PROTEIN, POC: ABNORMAL MG/DL
SPECIFIC GRAVITY, POC: 1
UROBILINOGEN, POC: ABNORMAL MG/DL

## 2025-07-08 PROCEDURE — 87086 URINE CULTURE/COLONY COUNT: CPT

## 2025-07-08 PROCEDURE — 87186 SC STD MICRODIL/AGAR DIL: CPT

## 2025-07-08 PROCEDURE — 87077 CULTURE AEROBIC IDENTIFY: CPT

## 2025-07-08 PROCEDURE — 81002 URINALYSIS NONAUTO W/O SCOPE: CPT | Performed by: FAMILY MEDICINE

## 2025-07-08 RX ORDER — CEPHALEXIN 500 MG/1
500 CAPSULE ORAL 2 TIMES DAILY
Qty: 28 CAPSULE | Refills: 0 | Status: SHIPPED | OUTPATIENT
Start: 2025-07-08 | End: 2025-07-22

## 2025-07-08 NOTE — PROGRESS NOTES
[facility]  [address] [place] [id number]  Dept: [phone]  Dept Fax: [phone]     Chief Complaint:  Chief Complaint   Patient presents with    Dysuria     X 3 days, she states there is visible blood that was bright red. She is concerned because this is happening monthly. She is not currently sexually active. States she is drinking plenty of water daily to try to avoid this.       Vitals:    07/08/25 1129   BP: (!) 118/58   Pulse: 84   SpO2: 98%   Weight: 75 kg (165 lb 6.4 oz)   Height: 1.6 m (5' 3\")       HPI:  70 y.o.female who presents for the following:      LUTS: x2-3 days; dysuria; suprapubic; hematuria; drinking plenty of water    Recall 6/6/25: LUTS: x1wk; hematuria, dysuria; had similar issues 1mo ago which improved after abx     Recall 5/8/25: LUTS: x1 week; taking Azo; feels dysuria; has hematuria; frequency/urgency   -----------------------------------------------------------------------------    Assessment/Plan:  70 y.o. female here mainly for the following:  Recurrent urinary symptoms  The cultures continue to grow A. Urinae with appropriate treatment and resolution of symptoms  I have some suspicion that postmenopausal vaginal atrophy may be contributing. She also has stress incontinence. Discussed that she might benefit from seeing urogyn or possibly vaginal estrogen.   Today I provided enough keflex for 2 UTIs with 7 days tx each; she got itching with Augmentin last time  Should let me know if she has another recurrence and I can send to urogyn        ICD-10-CM    1. Recurrent UTI  N39.0 POCT Urinalysis no Micro     cephALEXin (KEFLEX) 500 MG capsule     Culture, Urine          Return if symptoms worsen or fail to improve.    Johnnie Livingston MD      -----------------------------------------------------------------------------      Objective     Physical Exam:  Physical Exam  Vitals reviewed.   Constitutional:       General: She is not in acute distress.

## 2025-07-10 ENCOUNTER — TELEPHONE (OUTPATIENT)
Dept: FAMILY MEDICINE CLINIC | Age: 70
End: 2025-07-10

## 2025-07-10 NOTE — TELEPHONE ENCOUNTER
[facility] needing specific brand of test strips that patient is currently using. [facility] msg sent to verify.

## 2025-07-12 LAB
BACTERIA UR CULT: ABNORMAL
ORGANISM: ABNORMAL
ORGANISM: ABNORMAL

## 2025-07-16 DIAGNOSIS — Z79.4 TYPE 2 DIABETES MELLITUS WITHOUT COMPLICATION, WITH LONG-TERM CURRENT USE OF INSULIN (HCC): ICD-10-CM

## 2025-07-16 DIAGNOSIS — E11.9 TYPE 2 DIABETES MELLITUS WITHOUT COMPLICATION, WITH LONG-TERM CURRENT USE OF INSULIN (HCC): ICD-10-CM

## 2025-07-16 RX ORDER — SEMAGLUTIDE 0.68 MG/ML
0.5 INJECTION, SOLUTION SUBCUTANEOUS WEEKLY
Qty: 3 ML | Refills: 0 | Status: SHIPPED | OUTPATIENT
Start: 2025-07-16

## 2025-08-01 ENCOUNTER — TELEPHONE (OUTPATIENT)
Dept: FAMILY MEDICINE CLINIC | Age: 70
End: 2025-08-01

## 2025-08-01 DIAGNOSIS — E11.9 TYPE 2 DIABETES MELLITUS WITHOUT COMPLICATION, WITH LONG-TERM CURRENT USE OF INSULIN (HCC): ICD-10-CM

## 2025-08-01 DIAGNOSIS — Z79.4 TYPE 2 DIABETES MELLITUS WITHOUT COMPLICATION, WITH LONG-TERM CURRENT USE OF INSULIN (HCC): ICD-10-CM

## 2025-08-01 RX ORDER — SYRING-NEEDL,DISP,INSUL,0.3 ML 30 GX5/16"
1 SYRINGE, EMPTY DISPOSABLE MISCELLANEOUS 3 TIMES DAILY
Qty: 1 EACH | Refills: 0 | Status: SHIPPED | OUTPATIENT
Start: 2025-08-01

## 2025-08-01 RX ORDER — AVOBENZONE, HOMOSALATE, OCTISALATE, OCTOCRYLENE 30; 40; 45; 26 MG/ML; MG/ML; MG/ML; MG/ML
1 CREAM TOPICAL 3 TIMES DAILY
Qty: 100 EACH | Refills: 11 | Status: SHIPPED | OUTPATIENT
Start: 2025-08-01

## 2025-08-01 RX ORDER — GLUCOSAMINE HCL/CHONDROITIN SU 500-400 MG
CAPSULE ORAL
Qty: 100 STRIP | Refills: 11 | Status: SHIPPED | OUTPATIENT
Start: 2025-08-01

## 2025-08-04 DIAGNOSIS — E11.9 TYPE 2 DIABETES MELLITUS WITHOUT COMPLICATION, WITH LONG-TERM CURRENT USE OF INSULIN (HCC): ICD-10-CM

## 2025-08-04 DIAGNOSIS — Z79.4 TYPE 2 DIABETES MELLITUS WITHOUT COMPLICATION, WITH LONG-TERM CURRENT USE OF INSULIN (HCC): ICD-10-CM

## 2025-08-04 RX ORDER — INSULIN GLARGINE 100 [IU]/ML
20 INJECTION, SOLUTION SUBCUTANEOUS DAILY
Qty: 20 ML | Refills: 0 | Status: SHIPPED | OUTPATIENT
Start: 2025-08-04 | End: 2025-11-02

## 2025-08-05 ENCOUNTER — TRANSCRIBE ORDERS (OUTPATIENT)
Dept: GENERAL RADIOLOGY | Age: 70
End: 2025-08-05

## 2025-08-05 ENCOUNTER — HOSPITAL ENCOUNTER (OUTPATIENT)
Dept: GENERAL RADIOLOGY | Age: 70
Discharge: HOME | End: 2025-08-07
Payer: MEDICARE

## 2025-08-05 DIAGNOSIS — M79.671 RIGHT FOOT PAIN: Primary | ICD-10-CM

## 2025-08-05 DIAGNOSIS — M79.671 RIGHT FOOT PAIN: ICD-10-CM

## 2025-08-05 PROCEDURE — 73630 X-RAY EXAM OF FOOT: CPT

## 2025-08-21 ENCOUNTER — OFFICE VISIT (OUTPATIENT)
Dept: FAMILY MEDICINE CLINIC | Age: 70
End: 2025-08-21

## 2025-08-21 VITALS
OXYGEN SATURATION: 99 % | HEART RATE: 101 BPM | WEIGHT: 161 LBS | SYSTOLIC BLOOD PRESSURE: 122 MMHG | BODY MASS INDEX: 28.53 KG/M2 | HEIGHT: 63 IN | TEMPERATURE: 97.2 F | DIASTOLIC BLOOD PRESSURE: 80 MMHG

## 2025-08-21 DIAGNOSIS — M79.671 RIGHT FOOT PAIN: Primary | ICD-10-CM

## 2025-08-25 ENCOUNTER — OFFICE VISIT (OUTPATIENT)
Dept: ORTHOPEDIC SURGERY | Age: 70
End: 2025-08-25

## 2025-08-25 VITALS
SYSTOLIC BLOOD PRESSURE: 116 MMHG | HEART RATE: 89 BPM | HEIGHT: 63 IN | WEIGHT: 161 LBS | DIASTOLIC BLOOD PRESSURE: 76 MMHG | OXYGEN SATURATION: 95 % | BODY MASS INDEX: 28.53 KG/M2 | TEMPERATURE: 98.7 F

## 2025-08-25 DIAGNOSIS — M84.374A STRESS FRACTURE OF METATARSAL BONE OF RIGHT FOOT, INITIAL ENCOUNTER: Primary | ICD-10-CM

## 2025-08-25 DIAGNOSIS — Z79.4 TYPE 2 DIABETES MELLITUS WITHOUT COMPLICATION, WITH LONG-TERM CURRENT USE OF INSULIN (HCC): ICD-10-CM

## 2025-08-25 DIAGNOSIS — E11.9 TYPE 2 DIABETES MELLITUS WITHOUT COMPLICATION, WITH LONG-TERM CURRENT USE OF INSULIN (HCC): ICD-10-CM

## 2025-08-25 DIAGNOSIS — M19.071 ARTHRITIS OF RIGHT MIDFOOT: ICD-10-CM

## 2025-08-25 RX ORDER — SIMVASTATIN 40 MG
40 TABLET ORAL NIGHTLY
Qty: 90 TABLET | Refills: 3 | Status: SHIPPED | OUTPATIENT
Start: 2025-08-25

## 2025-08-25 ASSESSMENT — ENCOUNTER SYMPTOMS
VOMITING: 0
COLOR CHANGE: 0
NAUSEA: 0
SHORTNESS OF BREATH: 0

## 2025-09-04 DIAGNOSIS — M19.071 ARTHRITIS OF RIGHT MIDFOOT: ICD-10-CM

## 2025-09-04 DIAGNOSIS — M84.374A STRESS FRACTURE OF METATARSAL BONE OF RIGHT FOOT, INITIAL ENCOUNTER: ICD-10-CM

## 2025-12-05 ENCOUNTER — APPOINTMENT (OUTPATIENT)
Dept: OTOLARYNGOLOGY | Facility: CLINIC | Age: 70
End: 2025-12-05
Payer: MEDICARE

## (undated) DEVICE — SUTURE VCRL SZ 4-0 L18IN ABSRB UD L19MM PS-2 3/8 CIR PRIM J496H

## (undated) DEVICE — GAUZE,SPONGE,4"X4",12PLY,STERILE,LF,2'S: Brand: MEDLINE

## (undated) DEVICE — BRUSH ENDO CLN L90.5IN SHTH DIA1.7MM BRIST DIA5-7MM 2-6MM

## (undated) DEVICE — ELECTRODE PT RET AD L9FT HI MOIST COND ADH HYDRGEL CORDED

## (undated) DEVICE — GLOVE ORANGE PI 8 1/2   MSG9085

## (undated) DEVICE — 3M™ STERI-STRIP™ REINFORCED ADHESIVE SKIN CLOSURES, R1547, 1/2 IN X 4 IN (12 MM X 100 MM), 6 STRIPS/ENVELOPE: Brand: 3M™ STERI-STRIP™

## (undated) DEVICE — Device

## (undated) DEVICE — INTENDED FOR TISSUE SEPARATION, AND OTHER PROCEDURES THAT REQUIRE A SHARP SURGICAL BLADE TO PUNCTURE OR CUT.: Brand: BARD-PARKER ® CARBON RIB-BACK BLADES

## (undated) DEVICE — SYRINGE, 10 CC, LUER LOCK

## (undated) DEVICE — CAUTERY, PENCIL, PUSH BUTTON, SMOKE EVAC, 70MM

## (undated) DEVICE — SUTURE PROL SZ 0 L30IN NONABSORBABLE BLU SH L26MM 1/2 CIR 8834H

## (undated) DEVICE — MARKER SURG SKIN GENTIAN VLT REG TIP W/ 6IN RUL

## (undated) DEVICE — PACK,LAPAROTOMY,NO GOWNS: Brand: MEDLINE

## (undated) DEVICE — SUTURE PERMA-HAND SZ 2-0 L30IN NONABSORBABLE BLK L26MM SH K833H

## (undated) DEVICE — TRAY, SKIN SCRUB, WET PREP, WITH 4 COMPARMENT

## (undated) DEVICE — MARKER, SKIN, W/ RULER, LF, STERILE

## (undated) DEVICE — GAUZE,SPONGE,4"X4",16PLY,XRAY,STRL,LF: Brand: MEDLINE

## (undated) DEVICE — SUTURE, VICRYL PLUS 3-0, SH, 27IN

## (undated) DEVICE — DRAPE CAM W7INXL96MM CVR SURG EQUIP LSR ARM UNIV VID CAM

## (undated) DEVICE — GOWN, SURGICAL, ROYAL SILK, LG, STERILE

## (undated) DEVICE — TRAP POLYP BALEEN

## (undated) DEVICE — 3M™ IOBAN™ 2 ANTIMICROBIAL INCISE DRAPE 6650EZ: Brand: IOBAN™ 2

## (undated) DEVICE — ELECTROSURGICAL PENCIL BUTTON SWITCH E-Z CLEAN COATED BLADE ELECTRODE 10 FT (3 M) CORD HOLSTER: Brand: MEGADYNE

## (undated) DEVICE — COUNTER NDL 40 COUNT HLD 70 FOAM BLK ADH W/ MAG

## (undated) DEVICE — HYPODERMIC SAFETY NEEDLE: Brand: MAGELLAN

## (undated) DEVICE — CHLORAPREP 26ML ORANGE

## (undated) DEVICE — SUTURE VCRL + SZ 3-0 L36IN ABSRB UD L36MM CT-1 1/2 CIR VCP944H

## (undated) DEVICE — STRAP, VELCRO, BODY, 4 X 60IN, NS

## (undated) DEVICE — DRAPE, TIBURON, SPLIT SHEET, REINF ADH STRIP, 77X122

## (undated) DEVICE — SYRINGE IRRIG 60ML SFT PLIABLE BLB EZ TO GRP 1 HND USE W/

## (undated) DEVICE — ELECTRODE, ELECTROSURGICAL, BLADE, INSULATED, ENT/IMA, STERILE

## (undated) DEVICE — FORCEPS BX L240CM JAW DIA2.8MM L CAP W/ NDL MIC MESH TOOTH

## (undated) DEVICE — SNARE ENDOSCP AD L240CM LOOP W10MM SHTH DIA2.4MM RND INSUL

## (undated) DEVICE — SINGLE PORT MANIFOLD: Brand: NEPTUNE 2

## (undated) DEVICE — CONMED SCOPE SAVER BITE BLOCK, 20X27 MM: Brand: SCOPE SAVER

## (undated) DEVICE — SUTURE, VICRYL, 4-0, 18 IN, UNDYED BR PS-2

## (undated) DEVICE — TUBE SET 96 MM 64 MM H2O PERISTALTIC STD AUX CHANNEL

## (undated) DEVICE — GLOVE SURG SZ 75 STD WHT LTX SYN POLYMER BEAD REINF ANTI RL

## (undated) DEVICE — GOWN, SURGICAL, ROYAL SILK, XL, STERILE

## (undated) DEVICE — ELECTRODE, PAIRED SUBDERMAL OTO

## (undated) DEVICE — 1010 S-DRAPE TOWEL DRAPE 10/BX: Brand: STERI-DRAPE™

## (undated) DEVICE — KIT ARMOR C DRP COLLAPSIBLE AND SELF EXP TOP CVR FOR FLUOROSCOPIC

## (undated) DEVICE — TUBING, SUCTION, 1/4" X 10', STRAIGHT: Brand: MEDLINE

## (undated) DEVICE — SUTURE, SILK, 2-0, 18 IN, BLACK

## (undated) DEVICE — PROTECTOR, NERVE, ULNAR, PINK

## (undated) DEVICE — ENDO CARRY-ON PROCEDURE KIT: Brand: ENDO CARRY-ON PROCEDURE KIT

## (undated) DEVICE — SUTURE, SILK, 3-0, 18 IN, MULTIPACK, BLACK

## (undated) DEVICE — SUTURE VCRL SZ 3-0 L27IN ABSRB UD L26MM SH 1/2 CIR J416H

## (undated) DEVICE — 3M™ TEGADERM™ TRANSPARENT FILM DRESSING FRAME STYLE, 1626W, 4 IN X 4-3/4 IN (10 CM X 12 CM), 50/CT 4CT/CASE: Brand: 3M™ TEGADERM™

## (undated) DEVICE — GOWN,AURORA,NONREINFORCED,LARGE: Brand: MEDLINE

## (undated) DEVICE — SHEET,DRAPE,53X77,STERILE: Brand: MEDLINE

## (undated) DEVICE — NEEDLE, SAFETY, 25 GA X 1.5 IN

## (undated) DEVICE — CORD, FORCEP, BIPOLAR, DISP

## (undated) DEVICE — Device: Brand: ENDO SMARTCAP

## (undated) DEVICE — CUP, MEDICINE, GRADUATED, 2 OZ, PLASTIC, DISP, LF

## (undated) DEVICE — GLOVE, SURGICAL, PROTEXIS PI , 7.5, PF, LF

## (undated) DEVICE — DRAPE, INSTRUMENT, W/POUCH, STERI DRAPE, 7 X 11 IN, DISPOSABLE, STERILE

## (undated) DEVICE — PAD, PREP, ALCOHOL, LARGE, 2-PLY, WEBCOL

## (undated) DEVICE — NEEDLE HYPO 25GA L1.5IN BLU POLYPR HUB S STL REG BVL STR

## (undated) DEVICE — SYRINGE MED 10ML LUERLOCK TIP W/O SFTY DISP